# Patient Record
Sex: MALE | Race: WHITE | Employment: FULL TIME | URBAN - METROPOLITAN AREA
[De-identification: names, ages, dates, MRNs, and addresses within clinical notes are randomized per-mention and may not be internally consistent; named-entity substitution may affect disease eponyms.]

---

## 2017-01-13 RX ORDER — METFORMIN HYDROCHLORIDE 500 MG/1
TABLET, EXTENDED RELEASE ORAL
Qty: 120 TABLET | Refills: 0 | Status: SHIPPED | OUTPATIENT
Start: 2017-01-13 | End: 2017-02-14 | Stop reason: SDUPTHER

## 2017-01-16 DIAGNOSIS — Z12.5 PROSTATE CANCER SCREENING: ICD-10-CM

## 2017-01-16 DIAGNOSIS — E11.8 TYPE 2 DIABETES MELLITUS WITH COMPLICATION, WITHOUT LONG-TERM CURRENT USE OF INSULIN (HCC): ICD-10-CM

## 2017-01-16 LAB
HBA1C MFR BLD: 8.5 % (ref 4.8–5.9)
PROSTATE SPECIFIC ANTIGEN: 0.72 NG/ML (ref 0–3.89)

## 2017-01-19 DIAGNOSIS — I10 ESSENTIAL HYPERTENSION: ICD-10-CM

## 2017-01-19 RX ORDER — SPIRONOLACTONE 50 MG/1
50 TABLET, FILM COATED ORAL DAILY
Qty: 30 TABLET | Refills: 5 | Status: SHIPPED | OUTPATIENT
Start: 2017-01-19 | End: 2017-07-16 | Stop reason: SDUPTHER

## 2017-02-01 RX ORDER — CANAGLIFLOZIN 100 MG/1
TABLET, FILM COATED ORAL
Qty: 90 TABLET | Refills: 1 | Status: SHIPPED | OUTPATIENT
Start: 2017-02-01 | End: 2017-09-07 | Stop reason: SDUPTHER

## 2017-02-01 RX ORDER — GLIPIZIDE 10 MG/1
TABLET, FILM COATED, EXTENDED RELEASE ORAL
Qty: 180 TABLET | Refills: 1 | Status: SHIPPED | OUTPATIENT
Start: 2017-02-01 | End: 2017-07-25 | Stop reason: SDUPTHER

## 2017-02-01 RX ORDER — LOSARTAN POTASSIUM AND HYDROCHLOROTHIAZIDE 25; 100 MG/1; MG/1
TABLET ORAL
Qty: 90 TABLET | Refills: 1 | Status: SHIPPED | OUTPATIENT
Start: 2017-02-01 | End: 2017-07-25 | Stop reason: SDUPTHER

## 2017-02-06 ENCOUNTER — OFFICE VISIT (OUTPATIENT)
Dept: FAMILY MEDICINE CLINIC | Age: 57
End: 2017-02-06

## 2017-02-06 VITALS
DIASTOLIC BLOOD PRESSURE: 84 MMHG | TEMPERATURE: 98.2 F | BODY MASS INDEX: 40.43 KG/M2 | WEIGHT: 315 LBS | RESPIRATION RATE: 15 BRPM | HEART RATE: 92 BPM | SYSTOLIC BLOOD PRESSURE: 138 MMHG | HEIGHT: 74 IN

## 2017-02-06 DIAGNOSIS — E78.1 HYPERTRIGLYCERIDEMIA: ICD-10-CM

## 2017-02-06 DIAGNOSIS — I10 ESSENTIAL HYPERTENSION: ICD-10-CM

## 2017-02-06 DIAGNOSIS — E11.8 TYPE 2 DIABETES MELLITUS WITH COMPLICATION, WITHOUT LONG-TERM CURRENT USE OF INSULIN (HCC): Primary | ICD-10-CM

## 2017-02-06 PROCEDURE — 99214 OFFICE O/P EST MOD 30 MIN: CPT | Performed by: FAMILY MEDICINE

## 2017-02-06 ASSESSMENT — ENCOUNTER SYMPTOMS
ABDOMINAL PAIN: 0
SHORTNESS OF BREATH: 0

## 2017-02-14 RX ORDER — METFORMIN HYDROCHLORIDE 500 MG/1
TABLET, EXTENDED RELEASE ORAL
Qty: 120 TABLET | Refills: 0 | Status: SHIPPED | OUTPATIENT
Start: 2017-02-14 | End: 2017-03-15 | Stop reason: SDUPTHER

## 2017-03-15 RX ORDER — METFORMIN HYDROCHLORIDE 500 MG/1
TABLET, EXTENDED RELEASE ORAL
Qty: 120 TABLET | Refills: 0 | Status: SHIPPED | OUTPATIENT
Start: 2017-03-15 | End: 2017-04-14 | Stop reason: SDUPTHER

## 2017-03-30 RX ORDER — EXENATIDE 250 UG/ML
INJECTION SUBCUTANEOUS
Qty: 2.4 ML | Refills: 1 | Status: SHIPPED | OUTPATIENT
Start: 2017-03-30 | End: 2017-06-03 | Stop reason: SDUPTHER

## 2017-04-11 RX ORDER — METOPROLOL SUCCINATE 100 MG/1
TABLET, EXTENDED RELEASE ORAL
Refills: 3 | COMMUNITY
Start: 2017-03-15 | End: 2017-12-19 | Stop reason: SDUPTHER

## 2017-04-12 ENCOUNTER — OFFICE VISIT (OUTPATIENT)
Dept: CARDIOLOGY | Age: 57
End: 2017-04-12

## 2017-04-12 VITALS
SYSTOLIC BLOOD PRESSURE: 124 MMHG | OXYGEN SATURATION: 96 % | HEIGHT: 74 IN | DIASTOLIC BLOOD PRESSURE: 68 MMHG | RESPIRATION RATE: 16 BRPM | WEIGHT: 315 LBS | BODY MASS INDEX: 40.43 KG/M2 | HEART RATE: 88 BPM

## 2017-04-12 DIAGNOSIS — I10 ESSENTIAL HYPERTENSION: Primary | ICD-10-CM

## 2017-04-12 DIAGNOSIS — Z95.0 S/P CARDIAC PACEMAKER PROCEDURE: ICD-10-CM

## 2017-04-12 DIAGNOSIS — E66.9 OBESITY, UNSPECIFIED OBESITY SEVERITY, UNSPECIFIED OBESITY TYPE: ICD-10-CM

## 2017-04-12 DIAGNOSIS — Z87.891 HISTORY OF TOBACCO ABUSE: ICD-10-CM

## 2017-04-12 PROCEDURE — 99213 OFFICE O/P EST LOW 20 MIN: CPT | Performed by: INTERNAL MEDICINE

## 2017-04-12 ASSESSMENT — ENCOUNTER SYMPTOMS
SHORTNESS OF BREATH: 0
CHEST TIGHTNESS: 0

## 2017-04-14 RX ORDER — METFORMIN HYDROCHLORIDE 500 MG/1
TABLET, EXTENDED RELEASE ORAL
Qty: 120 TABLET | Refills: 0 | Status: SHIPPED | OUTPATIENT
Start: 2017-04-14 | End: 2017-05-13 | Stop reason: SDUPTHER

## 2017-05-15 RX ORDER — METFORMIN HYDROCHLORIDE 500 MG/1
TABLET, EXTENDED RELEASE ORAL
Qty: 120 TABLET | Refills: 0 | Status: SHIPPED | OUTPATIENT
Start: 2017-05-15 | End: 2017-06-13 | Stop reason: SDUPTHER

## 2017-06-05 RX ORDER — EXENATIDE 250 UG/ML
INJECTION SUBCUTANEOUS
Qty: 2.4 ML | Refills: 0 | Status: SHIPPED | OUTPATIENT
Start: 2017-06-05 | End: 2017-07-04 | Stop reason: SDUPTHER

## 2017-06-13 RX ORDER — METFORMIN HYDROCHLORIDE 500 MG/1
TABLET, EXTENDED RELEASE ORAL
Qty: 120 TABLET | Refills: 0 | Status: SHIPPED | OUTPATIENT
Start: 2017-06-13 | End: 2017-07-13 | Stop reason: SDUPTHER

## 2017-06-19 DIAGNOSIS — I10 ESSENTIAL HYPERTENSION: ICD-10-CM

## 2017-06-19 DIAGNOSIS — E78.1 HYPERTRIGLYCERIDEMIA: ICD-10-CM

## 2017-06-19 DIAGNOSIS — E11.8 TYPE 2 DIABETES MELLITUS WITH COMPLICATION, WITHOUT LONG-TERM CURRENT USE OF INSULIN (HCC): ICD-10-CM

## 2017-06-19 LAB
ALBUMIN SERPL-MCNC: 4.4 G/DL (ref 3.9–4.9)
ALP BLD-CCNC: 66 U/L (ref 35–104)
ALT SERPL-CCNC: 18 U/L (ref 0–41)
ANION GAP SERPL CALCULATED.3IONS-SCNC: 12 MEQ/L (ref 7–13)
AST SERPL-CCNC: 11 U/L (ref 0–40)
BASOPHILS ABSOLUTE: 0 K/UL (ref 0–0.2)
BASOPHILS RELATIVE PERCENT: 0.7 %
BILIRUB SERPL-MCNC: 0.3 MG/DL (ref 0–1.2)
BUN BLDV-MCNC: 17 MG/DL (ref 6–20)
CALCIUM SERPL-MCNC: 9.2 MG/DL (ref 8.6–10.2)
CHLORIDE BLD-SCNC: 100 MEQ/L (ref 98–107)
CHOLESTEROL, TOTAL: 199 MG/DL (ref 0–199)
CO2: 24 MEQ/L (ref 22–29)
CREAT SERPL-MCNC: 0.72 MG/DL (ref 0.7–1.2)
EOSINOPHILS ABSOLUTE: 0.2 K/UL (ref 0–0.7)
EOSINOPHILS RELATIVE PERCENT: 3.5 %
GFR AFRICAN AMERICAN: >60
GFR NON-AFRICAN AMERICAN: >60
GLOBULIN: 2.5 G/DL (ref 2.3–3.5)
GLUCOSE BLD-MCNC: 194 MG/DL (ref 74–109)
HBA1C MFR BLD: 9.2 % (ref 4.8–5.9)
HCT VFR BLD CALC: 47.4 % (ref 42–52)
HDLC SERPL-MCNC: 50 MG/DL (ref 40–59)
HEMOGLOBIN: 16 G/DL (ref 14–18)
LDL CHOLESTEROL CALCULATED: 107 MG/DL (ref 0–129)
LYMPHOCYTES ABSOLUTE: 1.9 K/UL (ref 1–4.8)
LYMPHOCYTES RELATIVE PERCENT: 27 %
MCH RBC QN AUTO: 26.7 PG (ref 27–31.3)
MCHC RBC AUTO-ENTMCNC: 33.7 % (ref 33–37)
MCV RBC AUTO: 79 FL (ref 80–100)
MONOCYTES ABSOLUTE: 0.5 K/UL (ref 0.2–0.8)
MONOCYTES RELATIVE PERCENT: 7.2 %
NEUTROPHILS ABSOLUTE: 4.3 K/UL (ref 1.4–6.5)
NEUTROPHILS RELATIVE PERCENT: 61.6 %
PDW BLD-RTO: 14.2 % (ref 11.5–14.5)
PLATELET # BLD: 198 K/UL (ref 130–400)
POTASSIUM SERPL-SCNC: 4.5 MEQ/L (ref 3.5–5.1)
RBC # BLD: 6 M/UL (ref 4.7–6.1)
SODIUM BLD-SCNC: 136 MEQ/L (ref 132–144)
TOTAL PROTEIN: 6.9 G/DL (ref 6.4–8.1)
TRIGL SERPL-MCNC: 210 MG/DL (ref 0–200)
WBC # BLD: 7 K/UL (ref 4.8–10.8)

## 2017-07-05 RX ORDER — EXENATIDE 250 UG/ML
INJECTION SUBCUTANEOUS
Qty: 2.4 ML | Refills: 0 | Status: SHIPPED | OUTPATIENT
Start: 2017-07-05 | End: 2017-07-17

## 2017-07-13 RX ORDER — METFORMIN HYDROCHLORIDE 500 MG/1
TABLET, EXTENDED RELEASE ORAL
Qty: 120 TABLET | Refills: 0 | Status: SHIPPED | OUTPATIENT
Start: 2017-07-13 | End: 2017-07-17 | Stop reason: SDUPTHER

## 2017-07-17 ENCOUNTER — OFFICE VISIT (OUTPATIENT)
Dept: FAMILY MEDICINE CLINIC | Age: 57
End: 2017-07-17

## 2017-07-17 VITALS
SYSTOLIC BLOOD PRESSURE: 136 MMHG | HEART RATE: 84 BPM | HEIGHT: 74 IN | DIASTOLIC BLOOD PRESSURE: 82 MMHG | WEIGHT: 315 LBS | BODY MASS INDEX: 40.43 KG/M2 | OXYGEN SATURATION: 96 % | RESPIRATION RATE: 16 BRPM | TEMPERATURE: 98.5 F

## 2017-07-17 DIAGNOSIS — I10 ESSENTIAL HYPERTENSION: ICD-10-CM

## 2017-07-17 DIAGNOSIS — E78.1 HYPERTRIGLYCERIDEMIA: ICD-10-CM

## 2017-07-17 DIAGNOSIS — E11.8 TYPE 2 DIABETES MELLITUS WITH COMPLICATION, WITHOUT LONG-TERM CURRENT USE OF INSULIN (HCC): Primary | ICD-10-CM

## 2017-07-17 DIAGNOSIS — E11.8 TYPE 2 DIABETES MELLITUS WITH COMPLICATION, WITHOUT LONG-TERM CURRENT USE OF INSULIN (HCC): ICD-10-CM

## 2017-07-17 LAB
CREATININE URINE: 39 MG/DL
MICROALBUMIN UR-MCNC: <1.2 MG/DL
MICROALBUMIN/CREAT UR-RTO: NORMAL MG/G (ref 0–30)

## 2017-07-17 PROCEDURE — 99214 OFFICE O/P EST MOD 30 MIN: CPT | Performed by: FAMILY MEDICINE

## 2017-07-17 RX ORDER — METFORMIN HYDROCHLORIDE 500 MG/1
TABLET, EXTENDED RELEASE ORAL
Qty: 120 TABLET | Refills: 2 | Status: SHIPPED | OUTPATIENT
Start: 2017-07-17 | End: 2017-11-12 | Stop reason: SDUPTHER

## 2017-07-17 ASSESSMENT — ENCOUNTER SYMPTOMS
SHORTNESS OF BREATH: 0
ABDOMINAL PAIN: 0

## 2017-07-25 ENCOUNTER — OFFICE VISIT (OUTPATIENT)
Dept: FAMILY MEDICINE CLINIC | Age: 57
End: 2017-07-25

## 2017-07-25 VITALS
SYSTOLIC BLOOD PRESSURE: 122 MMHG | OXYGEN SATURATION: 98 % | WEIGHT: 315 LBS | HEART RATE: 86 BPM | DIASTOLIC BLOOD PRESSURE: 72 MMHG | TEMPERATURE: 98.4 F | RESPIRATION RATE: 17 BRPM | HEIGHT: 74 IN | BODY MASS INDEX: 40.43 KG/M2

## 2017-07-25 DIAGNOSIS — E78.1 HYPERTRIGLYCERIDEMIA: ICD-10-CM

## 2017-07-25 DIAGNOSIS — E11.8 TYPE 2 DIABETES MELLITUS WITH COMPLICATION, WITHOUT LONG-TERM CURRENT USE OF INSULIN (HCC): Primary | ICD-10-CM

## 2017-07-25 PROCEDURE — 99212 OFFICE O/P EST SF 10 MIN: CPT | Performed by: FAMILY MEDICINE

## 2017-07-25 RX ORDER — GLIPIZIDE 10 MG/1
TABLET, FILM COATED, EXTENDED RELEASE ORAL
Qty: 180 TABLET | Refills: 0 | Status: SHIPPED | OUTPATIENT
Start: 2017-07-25 | End: 2017-10-31 | Stop reason: SDUPTHER

## 2017-07-25 RX ORDER — PRAVASTATIN SODIUM 10 MG
10 TABLET ORAL EVERY EVENING
Qty: 90 TABLET | Refills: 0 | Status: SHIPPED | OUTPATIENT
Start: 2017-07-25 | End: 2017-10-19 | Stop reason: SDUPTHER

## 2017-07-25 RX ORDER — LOSARTAN POTASSIUM AND HYDROCHLOROTHIAZIDE 25; 100 MG/1; MG/1
TABLET ORAL
Qty: 90 TABLET | Refills: 0 | Status: SHIPPED | OUTPATIENT
Start: 2017-07-25 | End: 2017-10-31 | Stop reason: SDUPTHER

## 2017-07-25 ASSESSMENT — ENCOUNTER SYMPTOMS
NAUSEA: 0
VOMITING: 0

## 2017-08-21 ENCOUNTER — OFFICE VISIT (OUTPATIENT)
Dept: FAMILY MEDICINE CLINIC | Age: 57
End: 2017-08-21

## 2017-08-21 VITALS
RESPIRATION RATE: 20 BRPM | DIASTOLIC BLOOD PRESSURE: 84 MMHG | HEIGHT: 74 IN | OXYGEN SATURATION: 98 % | TEMPERATURE: 97.6 F | BODY MASS INDEX: 40.43 KG/M2 | SYSTOLIC BLOOD PRESSURE: 128 MMHG | WEIGHT: 315 LBS | HEART RATE: 87 BPM

## 2017-08-21 DIAGNOSIS — I10 ESSENTIAL HYPERTENSION: ICD-10-CM

## 2017-08-21 DIAGNOSIS — I25.10 CORONARY ARTERY DISEASE DUE TO LIPID RICH PLAQUE: ICD-10-CM

## 2017-08-21 DIAGNOSIS — Z00.00 HEALTH MAINTENANCE EXAMINATION: Primary | ICD-10-CM

## 2017-08-21 DIAGNOSIS — I25.83 CORONARY ARTERY DISEASE DUE TO LIPID RICH PLAQUE: ICD-10-CM

## 2017-08-21 DIAGNOSIS — E66.01 MORBID OBESITY WITH BMI OF 40.0-44.9, ADULT (HCC): ICD-10-CM

## 2017-08-21 DIAGNOSIS — E11.8 TYPE 2 DIABETES MELLITUS WITH COMPLICATION, WITHOUT LONG-TERM CURRENT USE OF INSULIN (HCC): ICD-10-CM

## 2017-08-21 DIAGNOSIS — E78.1 HYPERTRIGLYCERIDEMIA: ICD-10-CM

## 2017-08-21 PROCEDURE — 99396 PREV VISIT EST AGE 40-64: CPT | Performed by: FAMILY MEDICINE

## 2017-08-21 ASSESSMENT — ENCOUNTER SYMPTOMS
ABDOMINAL PAIN: 0
SHORTNESS OF BREATH: 0

## 2017-09-08 RX ORDER — CANAGLIFLOZIN 100 MG/1
TABLET, FILM COATED ORAL
Qty: 90 TABLET | Refills: 0 | Status: SHIPPED | OUTPATIENT
Start: 2017-09-08 | End: 2017-11-01 | Stop reason: SDUPTHER

## 2017-09-16 DIAGNOSIS — I10 ESSENTIAL HYPERTENSION: ICD-10-CM

## 2017-09-17 RX ORDER — METOPROLOL SUCCINATE 100 MG/1
TABLET, EXTENDED RELEASE ORAL
Qty: 30 TABLET | Refills: 2 | Status: SHIPPED | OUTPATIENT
Start: 2017-09-17 | End: 2017-12-14 | Stop reason: SDUPTHER

## 2017-10-11 ENCOUNTER — OFFICE VISIT (OUTPATIENT)
Dept: CARDIOLOGY | Age: 57
End: 2017-10-11

## 2017-10-11 VITALS
WEIGHT: 315 LBS | DIASTOLIC BLOOD PRESSURE: 72 MMHG | HEART RATE: 75 BPM | OXYGEN SATURATION: 96 % | RESPIRATION RATE: 16 BRPM | BODY MASS INDEX: 40.43 KG/M2 | HEIGHT: 74 IN | SYSTOLIC BLOOD PRESSURE: 132 MMHG

## 2017-10-11 DIAGNOSIS — I25.119 CORONARY ARTERY DISEASE INVOLVING NATIVE CORONARY ARTERY OF NATIVE HEART WITH ANGINA PECTORIS (HCC): ICD-10-CM

## 2017-10-11 DIAGNOSIS — Z95.0 S/P CARDIAC PACEMAKER PROCEDURE: ICD-10-CM

## 2017-10-11 DIAGNOSIS — R06.09 DOE (DYSPNEA ON EXERTION): ICD-10-CM

## 2017-10-11 DIAGNOSIS — R00.2 HEART PALPITATIONS: Primary | ICD-10-CM

## 2017-10-11 PROCEDURE — 99213 OFFICE O/P EST LOW 20 MIN: CPT | Performed by: INTERNAL MEDICINE

## 2017-10-11 ASSESSMENT — ENCOUNTER SYMPTOMS
CHEST TIGHTNESS: 0
SHORTNESS OF BREATH: 0

## 2017-10-11 NOTE — PROGRESS NOTES
Reason For Visit:  Chief Complaint   Patient presents with    6 Month Follow-Up    Hypertension    Coronary Artery Disease       HPI:  10/11/2017: Patient presents today for follow-up of hypertension. He is a . In December it 24 years since his pacemaker. RSV obese. Still drives a truck. He is diabetic and is uncontrolled. No angina congestive heart failure symptoms of syncope. See me in 6 months. 4/12/201: For follow-up of hypertension. He is a . Had a pacemaker by [facility]. Blood pressure is well-controlled. Gained a lot of weight. He needs to lose that weight see me in 6 months.     10/12/16: *For fu of dizziness,On toprol 100. Norvasc DCd. Aldactone working. K 3.7. Return to work on [employer]. See in 3M     9/21/2016: For fu of CAD. Still has mild CP. Retrosternal.No NV. Heaviness. . Diabetic and hypertensive. Obese. EKG showed SR. S/P PPM.H/O AF. Will increase toprol tp 100. Start aldactone 50. DC norvasc. BMP in 2 weeks. See me in 3 weeks. Cant work till then.      10/27/2015: Stress test OK. Echo OK. See in 6M. . S/P PPM by [facility]  09/14/2015: [facility]. Had cathby OI. Mild disease. Sounds like angina. Had a PPM by [employer]. Set for lexiscan and echo. And see me.     Problem List:  Patient Active Problem List   Diagnosis    Hypertension    DM (diabetes mellitus) ([address]    SEGURA (dyspnea on exertion)    BMI 40.0-44.9, adult ([address]    History of tobacco abuse    Sinus arrest    S/P cardiac pacemaker procedure    Abnormal nuclear stress test    Phlebitis    CAD (coronary artery disease)    Dizziness    Heart palpitations    Angina pectoris (HCC)       Allergies: Allergies   Allergen Reactions    Actos [Pioglitazone]        Personal History:   has a past medical history of Angina pectoris ([address] Benign neoplasm of colon; Benign neoplasm of rectum and anal canal; Diverticulosis of colon (without mention of hemorrhage); Dizziness; Heart palpitations;  Hypertension; and Type II or unspecified type diabetes mellitus without mention of complication, not stated as uncontrolled. Social History:   reports that he quit smoking about 19 years ago. He has never used smokeless tobacco. He reports that he drinks alcohol. He reports that he does not use drugs. Surgical History:  Past Surgical History:   Procedure Laterality Date    CARDIAC SURGERY  12/31/13    DR Anali Nguyễn    [facility] CATARACT REMOVAL      bilateral    COLONOSCOPY  01/19/2015    DR Chun Pereyra - POLYPS, DIVERTICULOSIS    OTHER SURGICAL HISTORY  12/30/2013    TANSVENOUS PACER LEAD PLACEMENT        Family History:  family history includes Cancer in his mother; Diabetes in his father.       Current Medications:    Current Outpatient Prescriptions:     metoprolol succinate (TOPROL XL) 100 MG extended release tablet, TAKE ONE TABLET BY MOUTH EVERY DAY, Disp: 30 tablet, Rfl: 2    INVOKANA 100 MG TABS tablet, Take 1 tablet by mouth  every morning before  breakfast, Disp: 90 tablet, Rfl: 0    losartan-hydrochlorothiazide (HYZAAR) 100-25 MG per tablet, Take 1 tablet by mouth  daily, Disp: 90 tablet, Rfl: 0    glipiZIDE (GLUCOTROL XL) 10 MG extended release tablet, Take 2 tablets by mouth  every day, Disp: 180 tablet, Rfl: 0    pravastatin (PRAVACHOL) 10 MG tablet, Take 1 tablet by mouth every evening, Disp: 90 tablet, Rfl: 0    spironolactone (ALDACTONE) 50 MG tablet, TAKE ONE TABLET BY MOUTH EVERY DAY, Disp: 30 tablet, Rfl: 11    metFORMIN (GLUCOPHAGE-XR) 500 MG extended release tablet, TAKE FOUR (4) TABLETS BY MOUTH EVERY DAY, Disp: 120 tablet, Rfl: 2    insulin glargine (TOUJEO SOLOSTAR) 300 UNIT/ML injection pen, 10 units sq daily (Patient taking differently: 15 units sq daily), Disp: 1 Pen, Rfl: 1    NOVOFINE 30G X 8 MM MISC, USE DAILY AS DIRECTED, Disp: 200 each, Rfl: 2    Insulin Pen Needle (NOVOFINE) 30G X 8 MM MISC, Uses 1 daily to inject insulin, Disp: 100 each, Rfl: 0    metoprolol succinate (TOPROL XL) 100 MG orders of the defined types were placed in this encounter. Plan:  1. Patient to see me in 6 months.  > I will continue to monitor patient clinically, if symptoms develop or worsen, they are to let me know ASAP or head to the nearest emergeny room. > Follow up as discussed or call office sooner if needed.  > If refills are needed after appointment contact pharmacy.       Electronically signed by: Gurdeep Mattson MD  10/11/2017 10:09 AM

## 2017-10-20 RX ORDER — PRAVASTATIN SODIUM 10 MG
TABLET ORAL
Qty: 30 TABLET | Refills: 0 | Status: SHIPPED | OUTPATIENT
Start: 2017-10-20 | End: 2017-11-23 | Stop reason: SDUPTHER

## 2017-10-31 RX ORDER — LOSARTAN POTASSIUM AND HYDROCHLOROTHIAZIDE 25; 100 MG/1; MG/1
TABLET ORAL
Qty: 90 TABLET | Refills: 0 | Status: SHIPPED | OUTPATIENT
Start: 2017-10-31 | End: 2018-02-01 | Stop reason: SDUPTHER

## 2017-10-31 RX ORDER — GLIPIZIDE 10 MG/1
TABLET, FILM COATED, EXTENDED RELEASE ORAL
Qty: 180 TABLET | Refills: 0 | Status: SHIPPED | OUTPATIENT
Start: 2017-10-31 | End: 2018-02-01 | Stop reason: SDUPTHER

## 2017-11-01 RX ORDER — CANAGLIFLOZIN 100 MG/1
TABLET, FILM COATED ORAL
Qty: 90 TABLET | Refills: 0 | Status: SHIPPED | OUTPATIENT
Start: 2017-11-01 | End: 2018-02-01 | Stop reason: SDUPTHER

## 2017-11-13 RX ORDER — METFORMIN HYDROCHLORIDE 500 MG/1
TABLET, EXTENDED RELEASE ORAL
Qty: 120 TABLET | Refills: 1 | Status: SHIPPED | OUTPATIENT
Start: 2017-11-13 | End: 2018-01-18 | Stop reason: SDUPTHER

## 2017-11-17 RX ORDER — INSULIN GLARGINE 300 U/ML
INJECTION, SOLUTION SUBCUTANEOUS
Qty: 4.5 ML | Refills: 0 | Status: SHIPPED | OUTPATIENT
Start: 2017-11-17 | End: 2018-01-19 | Stop reason: SDUPTHER

## 2017-11-27 RX ORDER — PRAVASTATIN SODIUM 10 MG
TABLET ORAL
Qty: 30 TABLET | Refills: 0 | Status: SHIPPED | OUTPATIENT
Start: 2017-11-27 | End: 2017-12-28 | Stop reason: SDUPTHER

## 2017-12-14 DIAGNOSIS — I10 ESSENTIAL HYPERTENSION: ICD-10-CM

## 2017-12-14 RX ORDER — METOPROLOL SUCCINATE 100 MG/1
TABLET, EXTENDED RELEASE ORAL
Qty: 30 TABLET | Refills: 1 | Status: SHIPPED | OUTPATIENT
Start: 2017-12-14 | End: 2018-01-26 | Stop reason: SDUPTHER

## 2017-12-19 ENCOUNTER — OFFICE VISIT (OUTPATIENT)
Dept: INTERNAL MEDICINE | Age: 57
End: 2017-12-19

## 2017-12-19 VITALS
HEART RATE: 88 BPM | TEMPERATURE: 97.8 F | OXYGEN SATURATION: 95 % | HEIGHT: 74 IN | DIASTOLIC BLOOD PRESSURE: 84 MMHG | WEIGHT: 315 LBS | SYSTOLIC BLOOD PRESSURE: 138 MMHG | BODY MASS INDEX: 40.43 KG/M2 | RESPIRATION RATE: 20 BRPM

## 2017-12-19 DIAGNOSIS — J40 BRONCHITIS: Primary | ICD-10-CM

## 2017-12-19 DIAGNOSIS — J02.9 SORE THROAT: ICD-10-CM

## 2017-12-19 DIAGNOSIS — J01.00 ACUTE MAXILLARY SINUSITIS, RECURRENCE NOT SPECIFIED: ICD-10-CM

## 2017-12-19 LAB — S PYO AG THROAT QL: NORMAL

## 2017-12-19 PROCEDURE — G8598 ASA/ANTIPLAT THER USED: HCPCS | Performed by: NURSE PRACTITIONER

## 2017-12-19 PROCEDURE — G8417 CALC BMI ABV UP PARAM F/U: HCPCS | Performed by: NURSE PRACTITIONER

## 2017-12-19 PROCEDURE — 3017F COLORECTAL CA SCREEN DOC REV: CPT | Performed by: NURSE PRACTITIONER

## 2017-12-19 PROCEDURE — 1036F TOBACCO NON-USER: CPT | Performed by: NURSE PRACTITIONER

## 2017-12-19 PROCEDURE — G8484 FLU IMMUNIZE NO ADMIN: HCPCS | Performed by: NURSE PRACTITIONER

## 2017-12-19 PROCEDURE — G8427 DOCREV CUR MEDS BY ELIG CLIN: HCPCS | Performed by: NURSE PRACTITIONER

## 2017-12-19 PROCEDURE — 87880 STREP A ASSAY W/OPTIC: CPT | Performed by: NURSE PRACTITIONER

## 2017-12-19 PROCEDURE — 99213 OFFICE O/P EST LOW 20 MIN: CPT | Performed by: NURSE PRACTITIONER

## 2017-12-19 RX ORDER — FLUTICASONE PROPIONATE 50 MCG
2 SPRAY, SUSPENSION (ML) NASAL DAILY
Qty: 1 BOTTLE | Refills: 0 | Status: SHIPPED | OUTPATIENT
Start: 2017-12-19 | End: 2018-10-06 | Stop reason: SDUPTHER

## 2017-12-19 RX ORDER — PREDNISONE 20 MG/1
40 TABLET ORAL DAILY
Qty: 10 TABLET | Refills: 0 | Status: SHIPPED | OUTPATIENT
Start: 2017-12-19 | End: 2017-12-24

## 2017-12-19 RX ORDER — GUAIFENESIN AND CODEINE PHOSPHATE 100; 10 MG/5ML; MG/5ML
5 SOLUTION ORAL EVERY 4 HOURS PRN
Qty: 150 ML | Refills: 0 | Status: SHIPPED | OUTPATIENT
Start: 2017-12-19 | End: 2017-12-26

## 2017-12-19 RX ORDER — DOXYCYCLINE HYCLATE 100 MG
100 TABLET ORAL 2 TIMES DAILY
Qty: 14 TABLET | Refills: 0 | Status: SHIPPED | OUTPATIENT
Start: 2017-12-19 | End: 2017-12-26

## 2017-12-19 ASSESSMENT — PATIENT HEALTH QUESTIONNAIRE - PHQ9
1. LITTLE INTEREST OR PLEASURE IN DOING THINGS: 0
2. FEELING DOWN, DEPRESSED OR HOPELESS: 0
SUM OF ALL RESPONSES TO PHQ9 QUESTIONS 1 & 2: 0
SUM OF ALL RESPONSES TO PHQ QUESTIONS 1-9: 0

## 2017-12-19 ASSESSMENT — ENCOUNTER SYMPTOMS
NAUSEA: 0
SORE THROAT: 1
COUGH: 1
WHEEZING: 1
ABDOMINAL PAIN: 0
SINUS PRESSURE: 1

## 2017-12-19 NOTE — LETTER
[address] [address]  Phone: [phone]  Fax: [phone]    Eleni Bob CNP        December 19, 2017     Patient: David Rothman   YOB: 1960   Date of Visit: 12/19/2017       To Whom it May Concern:    Darshan Orellana was seen in my clinic on 12/19/2017. He is under  my care from 12/19/17 through 12/25/17 and may return to work  on 12/26/17. If you have any questions or concerns, please don't hesitate to call.     Sincerely,         Heriberto Kay Sr., CNP

## 2017-12-19 NOTE — PATIENT INSTRUCTIONS
are not getting better as expected. Where can you learn more? Go to [email or web address] and sign in to your [employer] account. Enter H333 in the Tipstar box to learn more about \"Bronchitis: Care Instructions. \"     If you do not have an account, please click on the \"Sign Up Now\" link. Current as of: May 12, 2017  Content Version: 11.4  © 4441-4063 Healthwise, Incorporated. Care instructions adapted under license by [facility] ([facility]). If you have questions about a medical condition or this instruction, always ask your healthcare professional. [address] any warranty or liability for your use of this information.

## 2017-12-22 LAB — THROAT CULTURE: NORMAL

## 2017-12-26 ENCOUNTER — APPOINTMENT (OUTPATIENT)
Dept: GENERAL RADIOLOGY | Age: 57
End: 2017-12-26
Payer: COMMERCIAL

## 2017-12-26 ENCOUNTER — HOSPITAL ENCOUNTER (EMERGENCY)
Age: 57
Discharge: HOME | End: 2017-12-26
Attending: EMERGENCY MEDICINE
Payer: COMMERCIAL

## 2017-12-26 VITALS
RESPIRATION RATE: 16 BRPM | DIASTOLIC BLOOD PRESSURE: 88 MMHG | BODY MASS INDEX: 40.43 KG/M2 | WEIGHT: 315 LBS | OXYGEN SATURATION: 95 % | HEART RATE: 82 BPM | TEMPERATURE: 97.9 F | SYSTOLIC BLOOD PRESSURE: 146 MMHG | HEIGHT: 74 IN

## 2017-12-26 DIAGNOSIS — I20.8 OTHER FORMS OF ANGINA PECTORIS (HCC): Primary | ICD-10-CM

## 2017-12-26 LAB
ALBUMIN SERPL-MCNC: 4.5 G/DL (ref 3.9–4.9)
ALP BLD-CCNC: 63 U/L (ref 35–104)
ALT SERPL-CCNC: 30 U/L (ref 0–41)
ANION GAP SERPL CALCULATED.3IONS-SCNC: 16 MEQ/L (ref 7–13)
AST SERPL-CCNC: 21 U/L (ref 0–40)
BILIRUB SERPL-MCNC: 0.6 MG/DL (ref 0–1.2)
BUN BLDV-MCNC: 22 MG/DL (ref 6–20)
CALCIUM SERPL-MCNC: 9.8 MG/DL (ref 8.6–10.2)
CHLORIDE BLD-SCNC: 98 MEQ/L (ref 98–107)
CO2: 23 MEQ/L (ref 22–29)
CREAT SERPL-MCNC: 0.65 MG/DL (ref 0.7–1.2)
EKG ATRIAL RATE: 78 BPM
EKG P AXIS: 55 DEGREES
EKG P-R INTERVAL: 158 MS
EKG Q-T INTERVAL: 384 MS
EKG QRS DURATION: 84 MS
EKG QTC CALCULATION (BAZETT): 437 MS
EKG R AXIS: 57 DEGREES
EKG T AXIS: 63 DEGREES
EKG VENTRICULAR RATE: 78 BPM
GFR AFRICAN AMERICAN: >60
GFR NON-AFRICAN AMERICAN: >60
GLOBULIN: 2.6 G/DL (ref 2.3–3.5)
GLUCOSE BLD-MCNC: 238 MG/DL (ref 74–109)
POTASSIUM SERPL-SCNC: 4.7 MEQ/L (ref 3.5–5.1)
SODIUM BLD-SCNC: 137 MEQ/L (ref 132–144)
TOTAL PROTEIN: 7.1 G/DL (ref 6.4–8.1)
TROPONIN: <0.01 NG/ML (ref 0–0.01)
TROPONIN: <0.01 NG/ML (ref 0–0.01)

## 2017-12-26 PROCEDURE — 99284 EMERGENCY DEPT VISIT MOD MDM: CPT

## 2017-12-26 PROCEDURE — 71010 XR CHEST PORTABLE: CPT

## 2017-12-26 PROCEDURE — 36415 COLL VENOUS BLD VENIPUNCTURE: CPT

## 2017-12-26 PROCEDURE — 93005 ELECTROCARDIOGRAM TRACING: CPT

## 2017-12-26 PROCEDURE — 80053 COMPREHEN METABOLIC PANEL: CPT

## 2017-12-26 PROCEDURE — 84484 ASSAY OF TROPONIN QUANT: CPT

## 2017-12-26 RX ORDER — NITROGLYCERIN 0.4 MG/1
TABLET SUBLINGUAL
Qty: 25 TABLET | Refills: 3 | Status: SHIPPED | OUTPATIENT
Start: 2017-12-26 | End: 2019-07-15

## 2017-12-26 ASSESSMENT — ENCOUNTER SYMPTOMS
CHEST TIGHTNESS: 0
ANAL BLEEDING: 0
COLOR CHANGE: 0
RHINORRHEA: 0
VOMITING: 0
WHEEZING: 0
CONSTIPATION: 0
SORE THROAT: 0
ABDOMINAL DISTENTION: 0
STRIDOR: 0
EYE REDNESS: 0
SINUS PRESSURE: 0
ABDOMINAL PAIN: 0
BLOOD IN STOOL: 0
EYE DISCHARGE: 0
DIARRHEA: 0
EYE PAIN: 0
CHOKING: 0
VOICE CHANGE: 0
SHORTNESS OF BREATH: 0
FACIAL SWELLING: 0
PHOTOPHOBIA: 0
NAUSEA: 0
COUGH: 0
EYE ITCHING: 0
BACK PAIN: 0
TROUBLE SWALLOWING: 0

## 2017-12-26 NOTE — ED PROVIDER NOTES
[address] [facility] ED  eMERGENCY dEPARTMENT eNCOUnter      Pt Name: Evangelina Siemens  MRN: [id number]  [place] 1960  Date of evaluation: 12/26/2017  Provider: Aleida Grissom MD    CHIEF COMPLAINT       Chief Complaint   Patient presents with    Chest Pain     right rib pain for a couple weeks. today was dizzy cold sweats. felt uncomfortablle around throat at 1030 this morning. Went to urgent care, had abnormal ekg sent  to ed         HISTORY OF PRESENT ILLNESS   (Location/Symptom, Timing/Onset, Context/Setting, Quality, Duration, Modifying Factors, Severity)  Note limiting factors. Evangelina Siemens is a 56 y.o. male who presents to the emergency department With chest pain. Location symptoms are in the center of the chest.  Timing onset was several hours ago. Context and setting this happened at work. Quality pain was sharp. Duration 1-2 minutes. Modifying factors: Patient's had no prior treatment for this has had a pacemaker in the past he does not why other than he has slow heart rate. Severity mild to moderate. HPI    Nursing Notes were reviewed. REVIEW OF SYSTEMS    (2-9 systems for level 4, 10 or more for level 5)     Review of Systems   Constitutional: Negative for appetite change, chills, diaphoresis, fatigue and fever. HENT: Negative for congestion, dental problem, ear discharge, ear pain, facial swelling, hearing loss, mouth sores, nosebleeds, postnasal drip, rhinorrhea, sinus pressure, sneezing, sore throat, tinnitus, trouble swallowing and voice change. Eyes: Negative for photophobia, pain, discharge, redness, itching and visual disturbance. Respiratory: Negative for cough, choking, chest tightness, shortness of breath, wheezing and stridor. Cardiovascular: Positive for chest pain. Negative for palpitations and leg swelling.    Gastrointestinal: Negative for abdominal distention, abdominal pain, anal bleeding, blood in stool, constipation, diarrhea, nausea and normal.   Nursing note and vitals reviewed. DIAGNOSTIC RESULTS     EKG: All EKG's are interpreted by the Emergency Department Physician who either signs or Co-signs this chart in the absence of a cardiologist.    2 EKGs were performed both are identical.  There is a normal sinus rhythm with a rate of 82. There is an occasional PVC otherwise no ectopy. The ST segments are all normal there is no evidence of an acute STEMI MI. There is no dysrhythmia. The patient has a pacemaker but this is a native rhythm. Both EKGs were interpreted as normal by myself    RADIOLOGY:   Non-plain film images such as CT, Ultrasound and MRI are read by the radiologist. Plain radiographic images are visualized and preliminarily interpreted by the emergency physician with the below findings:    One view portable chest x-ray was performed which shows slight cardiomegaly a dual lead pacemaker otherwise no bony abnormalities hemothorax or pneumothorax impression normal 1 view chest x-ray    Interpretation per the Radiologist below, if available at the time of this note:    XR Chest Portable   Final Result   CARDIAC SILHOUETTE, MILDLY ENLARGED. NO RADIOGRAPHIC FINDINGS OF ACUTE CARDIOPULMONARY DISEASE ON PORTABLE PLAIN FILM. ED BEDSIDE ULTRASOUND:   Performed by ED Physician - none    LABS:  Labs Reviewed   COMPREHENSIVE METABOLIC PANEL - Abnormal; Notable for the following:        Result Value    Anion Gap 16 (*)     Glucose 238 (*)     BUN 22 (*)     CREATININE 0.65 (*)     All other components within normal limits   TROPONIN   TROPONIN   CBC WITH AUTO DIFFERENTIAL       All other labs were within normal range or not returned as of this dictation.     EMERGENCY DEPARTMENT COURSE and DIFFERENTIAL DIAGNOSIS/MDM:   Vitals:    Vitals:    12/26/17 1416   BP: (!) 146/88   Pulse: 82   Resp: 16   Temp: 97.9 °F (36.6 °C)   TempSrc: Oral   SpO2: 95%   Weight: (!) 330 lb (149.7 kg)   Height: 6' 2\" (1.88 m)       The patient had 2 troponins performed both of which are completely normal EKG showed normal and a chest x-ray as normal I informed the patient of all of this given copies of all of his lab tests and both EKGs. Patient will be discharged home in improved condition with diagnosis of angina he was given a prescription for nitroglycerin and encouraged follow-up with his cardiologist Dr. Lara Gonzalez this week. He nodded his head and understanding these instructions    MDM      CRITICAL CARE TIME     CONSULTS:  None    PROCEDURES:  Unless otherwise noted below, none     Procedures    FINAL IMPRESSION      1. Other forms of angina pectoris [facility])          DISPOSITION/PLAN   DISPOSITION Decision To Discharge 12/26/2017 06:51:11 PM      PATIENT REFERRED TO:  Catrina Cummins MD  [address]  [phone]    Go in 2 days  For follow up. Return if worse in any way. DISCHARGE MEDICATIONS:  New Prescriptions    NITROGLYCERIN (NITROSTAT) 0.4 MG SL TABLET    up to max of 3 total doses. If no relief after 1 dose, call 911.           (Please note that portions of this note were completed with a voice recognition program.  Efforts were made to edit the dictations but occasionally words are mis-transcribed.)    Tawny Perez MD (electronically signed)  Attending Emergency Physician            Tawny Perez MD  12/26/17 8512

## 2017-12-27 PROCEDURE — 93010 ELECTROCARDIOGRAM REPORT: CPT | Performed by: INTERNAL MEDICINE

## 2017-12-28 RX ORDER — PRAVASTATIN SODIUM 10 MG
TABLET ORAL
Qty: 30 TABLET | Refills: 0 | Status: ON HOLD | OUTPATIENT
Start: 2017-12-28 | End: 2017-12-31 | Stop reason: HOSPADM

## 2017-12-29 ENCOUNTER — HOSPITAL ENCOUNTER (INPATIENT)
Age: 57
LOS: 2 days | Discharge: HOME | DRG: 282 | End: 2017-12-31
Attending: INTERNAL MEDICINE | Admitting: INTERNAL MEDICINE
Payer: COMMERCIAL

## 2017-12-29 PROBLEM — R07.9 CHEST PAIN: Status: ACTIVE | Noted: 2017-12-29

## 2017-12-29 LAB
GLUCOSE BLD-MCNC: 225 MG/DL (ref 60–115)
PERFORMED ON: ABNORMAL

## 2017-12-29 PROCEDURE — 1210000000 HC MED SURG R&B

## 2017-12-29 PROCEDURE — 6370000000 HC RX 637 (ALT 250 FOR IP): Performed by: INTERNAL MEDICINE

## 2017-12-29 RX ORDER — ASPIRIN 81 MG/1
81 TABLET, CHEWABLE ORAL DAILY
Status: DISCONTINUED | OUTPATIENT
Start: 2017-12-29 | End: 2017-12-31 | Stop reason: HOSPADM

## 2017-12-29 RX ORDER — NICOTINE POLACRILEX 4 MG
15 LOZENGE BUCCAL PRN
Status: DISCONTINUED | OUTPATIENT
Start: 2017-12-29 | End: 2017-12-31 | Stop reason: HOSPADM

## 2017-12-29 RX ORDER — INSULIN GLARGINE 100 [IU]/ML
8 INJECTION, SOLUTION SUBCUTANEOUS NIGHTLY
Status: DISCONTINUED | OUTPATIENT
Start: 2017-12-29 | End: 2017-12-31 | Stop reason: HOSPADM

## 2017-12-29 RX ORDER — METFORMIN HYDROCHLORIDE 500 MG/1
1000 TABLET, EXTENDED RELEASE ORAL 2 TIMES DAILY WITH MEALS
Status: DISCONTINUED | OUTPATIENT
Start: 2017-12-29 | End: 2017-12-31 | Stop reason: HOSPADM

## 2017-12-29 RX ORDER — DEXTROSE MONOHYDRATE 25 G/50ML
12.5 INJECTION, SOLUTION INTRAVENOUS PRN
Status: DISCONTINUED | OUTPATIENT
Start: 2017-12-29 | End: 2017-12-31 | Stop reason: HOSPADM

## 2017-12-29 RX ORDER — DEXTROSE MONOHYDRATE 50 MG/ML
100 INJECTION, SOLUTION INTRAVENOUS PRN
Status: DISCONTINUED | OUTPATIENT
Start: 2017-12-29 | End: 2017-12-31 | Stop reason: HOSPADM

## 2017-12-29 RX ORDER — NITROGLYCERIN 0.4 MG/1
0.4 TABLET SUBLINGUAL EVERY 5 MIN PRN
Status: DISCONTINUED | OUTPATIENT
Start: 2017-12-29 | End: 2017-12-31 | Stop reason: HOSPADM

## 2017-12-29 RX ORDER — METOPROLOL SUCCINATE 100 MG/1
100 TABLET, EXTENDED RELEASE ORAL DAILY
Status: DISCONTINUED | OUTPATIENT
Start: 2017-12-29 | End: 2017-12-31 | Stop reason: HOSPADM

## 2017-12-29 RX ORDER — LOSARTAN POTASSIUM AND HYDROCHLOROTHIAZIDE 12.5; 5 MG/1; MG/1
2 TABLET ORAL DAILY
Status: DISCONTINUED | OUTPATIENT
Start: 2017-12-23 | End: 2017-12-31 | Stop reason: HOSPADM

## 2017-12-29 RX ORDER — GLIPIZIDE 10 MG/1
10 TABLET ORAL
Status: DISCONTINUED | OUTPATIENT
Start: 2017-12-30 | End: 2017-12-31 | Stop reason: HOSPADM

## 2017-12-29 RX ORDER — SPIRONOLACTONE 50 MG/1
50 TABLET, FILM COATED ORAL DAILY
Status: DISCONTINUED | OUTPATIENT
Start: 2017-12-29 | End: 2017-12-31 | Stop reason: HOSPADM

## 2017-12-29 RX ORDER — PRAVASTATIN SODIUM 10 MG
10 TABLET ORAL NIGHTLY
Status: DISCONTINUED | OUTPATIENT
Start: 2017-12-29 | End: 2017-12-30

## 2017-12-29 RX ORDER — FLUTICASONE PROPIONATE 50 MCG
2 SPRAY, SUSPENSION (ML) NASAL DAILY
Status: DISCONTINUED | OUTPATIENT
Start: 2017-12-29 | End: 2017-12-31 | Stop reason: HOSPADM

## 2017-12-29 RX ADMIN — INSULIN GLARGINE 8 UNITS: 100 INJECTION, SOLUTION SUBCUTANEOUS at 21:53

## 2017-12-29 RX ADMIN — PRAVASTATIN SODIUM 10 MG: 10 TABLET ORAL at 21:53

## 2017-12-29 ASSESSMENT — PAIN SCALES - GENERAL: PAINLEVEL_OUTOF10: 0

## 2017-12-29 NOTE — PLAN OF CARE
Problem: Pain Control  Goal: Maintain pain level at or below patient's acceptable level (or 5 if patient is unable to determine acceptable level)  Outcome: Ongoing      Problem: Cardiovascular  Goal: No DVT, peripheral vascular complications  Outcome: Ongoing    Goal: Hemodynamic stability  Outcome: Ongoing    Goal: Anticoagulate/Hct stable  Outcome: Ongoing    Goal: Agreement to quit smoking  Outcome: Ongoing    Goal: Weight maintained or lost  Outcome: Ongoing    Goal: Understanding of dietary restrictions  Outcome: Ongoing      Problem: Respiratory  Goal: No pulmonary complications  Outcome: Ongoing    Goal: O2 Sat > 90%  Outcome: Ongoing      Problem: Skin Integrity/Risk  Goal: No skin breakdown during hospitalization  Outcome: Ongoing      Problem: Intellectual/Education/Knowledge Deficit  Goal: Teaching initiated upon admission  Outcome: Ongoing    Goal: Written Disposition Instruction form completed  Outcome: Ongoing      Problem: Emotional/Psychosocial  Goal: Understanding of community resources  Outcome: Ongoing

## 2017-12-30 PROBLEM — I21.4 NSTEMI (NON-ST ELEVATED MYOCARDIAL INFARCTION) (HCC): Status: ACTIVE | Noted: 2017-12-30

## 2017-12-30 LAB
GLUCOSE BLD-MCNC: 178 MG/DL (ref 60–115)
GLUCOSE BLD-MCNC: 198 MG/DL (ref 60–115)
GLUCOSE BLD-MCNC: 199 MG/DL (ref 60–115)
GLUCOSE BLD-MCNC: 254 MG/DL (ref 60–115)
GLUCOSE BLD-MCNC: 425 MG/DL (ref 60–115)
PERFORMED ON: ABNORMAL
TROPONIN: 0.01 NG/ML (ref 0–0.01)
TROPONIN: 0.02 NG/ML (ref 0–0.01)
TROPONIN: 0.02 NG/ML (ref 0–0.01)

## 2017-12-30 PROCEDURE — 6370000000 HC RX 637 (ALT 250 FOR IP): Performed by: INTERNAL MEDICINE

## 2017-12-30 PROCEDURE — 1210000000 HC MED SURG R&B

## 2017-12-30 PROCEDURE — 6360000002 HC RX W HCPCS: Performed by: INTERNAL MEDICINE

## 2017-12-30 PROCEDURE — 36415 COLL VENOUS BLD VENIPUNCTURE: CPT

## 2017-12-30 PROCEDURE — 99222 1ST HOSP IP/OBS MODERATE 55: CPT | Performed by: INTERNAL MEDICINE

## 2017-12-30 PROCEDURE — 93005 ELECTROCARDIOGRAM TRACING: CPT

## 2017-12-30 PROCEDURE — 84484 ASSAY OF TROPONIN QUANT: CPT

## 2017-12-30 RX ORDER — MAGNESIUM SULFATE IN WATER 40 MG/ML
4 INJECTION, SOLUTION INTRAVENOUS ONCE
Status: COMPLETED | OUTPATIENT
Start: 2017-12-30 | End: 2017-12-30

## 2017-12-30 RX ORDER — ISOSORBIDE MONONITRATE 30 MG/1
30 TABLET, EXTENDED RELEASE ORAL DAILY
Status: DISCONTINUED | OUTPATIENT
Start: 2017-12-30 | End: 2017-12-31 | Stop reason: HOSPADM

## 2017-12-30 RX ORDER — ATORVASTATIN CALCIUM 80 MG/1
80 TABLET, FILM COATED ORAL NIGHTLY
Status: DISCONTINUED | OUTPATIENT
Start: 2017-12-30 | End: 2017-12-31 | Stop reason: HOSPADM

## 2017-12-30 RX ADMIN — MAGNESIUM SULFATE IN WATER 4 G: 40 INJECTION, SOLUTION INTRAVENOUS at 12:15

## 2017-12-30 RX ADMIN — METFORMIN HYDROCHLORIDE 1000 MG: 500 TABLET, EXTENDED RELEASE ORAL at 10:03

## 2017-12-30 RX ADMIN — ASPIRIN 81 MG 81 MG: 81 TABLET ORAL at 10:04

## 2017-12-30 RX ADMIN — ATORVASTATIN CALCIUM 80 MG: 80 TABLET, FILM COATED ORAL at 21:33

## 2017-12-30 RX ADMIN — ISOSORBIDE MONONITRATE 30 MG: 30 TABLET, EXTENDED RELEASE ORAL at 10:10

## 2017-12-30 RX ADMIN — METOPROLOL SUCCINATE 100 MG: 100 TABLET, EXTENDED RELEASE ORAL at 10:04

## 2017-12-30 RX ADMIN — CANAGLIFLOZIN 100 MG: 100 TABLET, FILM COATED ORAL at 10:10

## 2017-12-30 RX ADMIN — RIVAROXABAN 20 MG: 20 TABLET, FILM COATED ORAL at 10:04

## 2017-12-30 RX ADMIN — GLIPIZIDE 10 MG: 10 TABLET ORAL at 10:03

## 2017-12-30 RX ADMIN — SPIRONOLACTONE 50 MG: 50 TABLET ORAL at 10:03

## 2017-12-30 RX ADMIN — INSULIN GLARGINE 8 UNITS: 100 INJECTION, SOLUTION SUBCUTANEOUS at 21:33

## 2017-12-30 RX ADMIN — METFORMIN HYDROCHLORIDE 1000 MG: 500 TABLET, EXTENDED RELEASE ORAL at 17:45

## 2017-12-30 RX ADMIN — LOSARTAN POTASSIUM AND HYDROCHLOROTHIAZIDE 2 TABLET: 12.5; 5 TABLET ORAL at 10:04

## 2017-12-30 ASSESSMENT — ENCOUNTER SYMPTOMS
EYES NEGATIVE: 1
STRIDOR: 0
WHEEZING: 0
NAUSEA: 0
SHORTNESS OF BREATH: 1
BLOOD IN STOOL: 0
GASTROINTESTINAL NEGATIVE: 1
CHEST TIGHTNESS: 1
COUGH: 0

## 2017-12-31 VITALS
SYSTOLIC BLOOD PRESSURE: 134 MMHG | RESPIRATION RATE: 18 BRPM | BODY MASS INDEX: 40.43 KG/M2 | TEMPERATURE: 97.9 F | HEART RATE: 72 BPM | OXYGEN SATURATION: 96 % | DIASTOLIC BLOOD PRESSURE: 77 MMHG | HEIGHT: 74 IN | WEIGHT: 315 LBS

## 2017-12-31 PROBLEM — R07.9 CHEST PAIN: Status: RESOLVED | Noted: 2017-12-29 | Resolved: 2017-12-31

## 2017-12-31 LAB
ANION GAP SERPL CALCULATED.3IONS-SCNC: 14 MEQ/L (ref 7–13)
BUN BLDV-MCNC: 18 MG/DL (ref 6–20)
CALCIUM SERPL-MCNC: 8.7 MG/DL (ref 8.6–10.2)
CHLORIDE BLD-SCNC: 100 MEQ/L (ref 98–107)
CO2: 25 MEQ/L (ref 22–29)
CREAT SERPL-MCNC: 0.7 MG/DL (ref 0.7–1.2)
EKG ATRIAL RATE: 73 BPM
EKG ATRIAL RATE: 75 BPM
EKG P AXIS: 60 DEGREES
EKG P AXIS: 61 DEGREES
EKG P-R INTERVAL: 162 MS
EKG P-R INTERVAL: 168 MS
EKG Q-T INTERVAL: 404 MS
EKG Q-T INTERVAL: 410 MS
EKG QRS DURATION: 88 MS
EKG QRS DURATION: 90 MS
EKG QTC CALCULATION (BAZETT): 445 MS
EKG QTC CALCULATION (BAZETT): 457 MS
EKG R AXIS: 50 DEGREES
EKG R AXIS: 52 DEGREES
EKG T AXIS: 41 DEGREES
EKG T AXIS: 52 DEGREES
EKG VENTRICULAR RATE: 73 BPM
EKG VENTRICULAR RATE: 75 BPM
GFR AFRICAN AMERICAN: >60
GFR NON-AFRICAN AMERICAN: >60
GLUCOSE BLD-MCNC: 189 MG/DL (ref 60–115)
GLUCOSE BLD-MCNC: 189 MG/DL (ref 74–109)
PERFORMED ON: ABNORMAL
POTASSIUM SERPL-SCNC: 4 MEQ/L (ref 3.5–5.1)
SODIUM BLD-SCNC: 139 MEQ/L (ref 132–144)

## 2017-12-31 PROCEDURE — 93005 ELECTROCARDIOGRAM TRACING: CPT

## 2017-12-31 PROCEDURE — 99238 HOSP IP/OBS DSCHRG MGMT 30/<: CPT | Performed by: INTERNAL MEDICINE

## 2017-12-31 PROCEDURE — 6370000000 HC RX 637 (ALT 250 FOR IP): Performed by: INTERNAL MEDICINE

## 2017-12-31 PROCEDURE — 80048 BASIC METABOLIC PNL TOTAL CA: CPT

## 2017-12-31 PROCEDURE — 36415 COLL VENOUS BLD VENIPUNCTURE: CPT

## 2017-12-31 RX ORDER — ATORVASTATIN CALCIUM 80 MG/1
80 TABLET, FILM COATED ORAL NIGHTLY
Qty: 30 TABLET | Refills: 3 | Status: SHIPPED | OUTPATIENT
Start: 2017-12-31 | End: 2018-05-02 | Stop reason: SDUPTHER

## 2017-12-31 RX ORDER — ISOSORBIDE MONONITRATE 30 MG/1
30 TABLET, EXTENDED RELEASE ORAL DAILY
Qty: 30 TABLET | Refills: 3 | Status: SHIPPED | OUTPATIENT
Start: 2017-12-31 | End: 2018-04-21 | Stop reason: SDUPTHER

## 2017-12-31 RX ADMIN — LOSARTAN POTASSIUM AND HYDROCHLOROTHIAZIDE 2 TABLET: 12.5; 5 TABLET ORAL at 10:18

## 2017-12-31 RX ADMIN — SPIRONOLACTONE 50 MG: 50 TABLET ORAL at 10:19

## 2017-12-31 RX ADMIN — CANAGLIFLOZIN 100 MG: 100 TABLET, FILM COATED ORAL at 10:19

## 2017-12-31 RX ADMIN — RIVAROXABAN 20 MG: 20 TABLET, FILM COATED ORAL at 10:18

## 2017-12-31 RX ADMIN — ASPIRIN 81 MG 81 MG: 81 TABLET ORAL at 10:19

## 2017-12-31 RX ADMIN — METOPROLOL SUCCINATE 100 MG: 100 TABLET, EXTENDED RELEASE ORAL at 10:18

## 2017-12-31 RX ADMIN — ISOSORBIDE MONONITRATE 30 MG: 30 TABLET, EXTENDED RELEASE ORAL at 10:19

## 2017-12-31 RX ADMIN — GLIPIZIDE 10 MG: 10 TABLET ORAL at 10:18

## 2017-12-31 RX ADMIN — METFORMIN HYDROCHLORIDE 1000 MG: 500 TABLET, EXTENDED RELEASE ORAL at 10:18

## 2018-01-02 ENCOUNTER — OFFICE VISIT (OUTPATIENT)
Dept: CARDIOLOGY | Age: 58
End: 2018-01-02

## 2018-01-02 VITALS
RESPIRATION RATE: 12 BRPM | OXYGEN SATURATION: 97 % | DIASTOLIC BLOOD PRESSURE: 68 MMHG | HEART RATE: 79 BPM | BODY MASS INDEX: 42.42 KG/M2 | SYSTOLIC BLOOD PRESSURE: 118 MMHG | WEIGHT: 315 LBS

## 2018-01-02 DIAGNOSIS — I21.4 NON-ST ELEVATION MI (NSTEMI) (HCC): ICD-10-CM

## 2018-01-02 DIAGNOSIS — I10 ESSENTIAL HYPERTENSION: ICD-10-CM

## 2018-01-02 DIAGNOSIS — R00.2 HEART PALPITATIONS: Primary | ICD-10-CM

## 2018-01-02 DIAGNOSIS — I25.119 CORONARY ARTERY DISEASE INVOLVING NATIVE CORONARY ARTERY OF NATIVE HEART WITH ANGINA PECTORIS (HCC): ICD-10-CM

## 2018-01-02 PROCEDURE — 99214 OFFICE O/P EST MOD 30 MIN: CPT | Performed by: INTERNAL MEDICINE

## 2018-01-02 ASSESSMENT — ENCOUNTER SYMPTOMS
CHEST TIGHTNESS: 0
SHORTNESS OF BREATH: 0
APNEA: 0

## 2018-01-02 NOTE — PROGRESS NOTES
SUBCUTANEOUSLY DAILY, Disp: 4.5 mL, Rfl: 0    metFORMIN (GLUCOPHAGE-XR) 500 MG extended release tablet, TAKE 4 TABLETS BY MOUTH DAILY , Disp: 120 tablet, Rfl: 1    INVOKANA 100 MG TABS tablet, TAKE 1 TABLET BY MOUTH  EVERY MORNING BEFORE  BREAKFAST, Disp: 90 tablet, Rfl: 0    glipiZIDE (GLUCOTROL XL) 10 MG extended release tablet, TAKE 2 TABLETS BY MOUTH  EVERY DAY, Disp: 180 tablet, Rfl: 0    losartan-hydrochlorothiazide (HYZAAR) 100-25 MG per tablet, TAKE 1 TABLET BY MOUTH  DAILY, Disp: 90 tablet, Rfl: 0    spironolactone (ALDACTONE) 50 MG tablet, TAKE ONE TABLET BY MOUTH EVERY DAY, Disp: 30 tablet, Rfl: 11    NOVOFINE 30G X 8 MM MISC, USE DAILY AS DIRECTED, Disp: 200 each, Rfl: 2    Insulin Pen Needle (NOVOFINE) 30G X 8 MM MISC, Uses 1 daily to inject insulin, Disp: 100 each, Rfl: 0    ONE TOUCH ULTRA TEST strip, Check sugar once daily, Disp: 100 strip, Rfl: 3    rivaroxaban (XARELTO) 20 MG TABS tablet, Take 20 mg by mouth, Disp: , Rfl:     aspirin 81 MG tablet, Take 81 mg by mouth daily. , Disp: , Rfl:       Review of Systems:  Review of Systems   Constitutional: Positive for fatigue. Negative for appetite change. Respiratory: Negative for apnea, chest tightness and shortness of breath. Cardiovascular: Negative for chest pain, palpitations and leg swelling. Neurological: Negative for dizziness, syncope, weakness, light-headedness and headaches. Hematological: Does not bruise/bleed easily. Psychiatric/Behavioral: Negative for agitation, behavioral problems and confusion. The patient is not nervous/anxious and is not hyperactive. All other systems reviewed and are negative. Objective  Vitals:    01/02/18 1327   BP: 118/68   Site: Right Arm   Position: Sitting   Cuff Size: Large Adult   Pulse: 79   Resp: 12   SpO2: 97%   Weight: (!) 330 lb 6.4 oz (149.9 kg)         Physical Exam:  Physical Exam   Constitutional: He is oriented to person, place, and time.  He appears well-developed and well-nourished. HENT:   Head: Atraumatic. Eyes: Conjunctivae are normal. Pupils are equal, round, and reactive to light. Neck: No JVD present. No tracheal deviation present. No thyromegaly present. Cardiovascular: Normal rate, regular rhythm and intact distal pulses. Exam reveals no gallop and no friction rub. No murmur heard. Pulmonary/Chest: No respiratory distress. He has no wheezes. He has no rales. He exhibits no tenderness. Abdominal: Soft. He exhibits no mass. There is no tenderness. There is no guarding. Musculoskeletal: Normal range of motion. He exhibits no edema. Lymphadenopathy:     He has no cervical adenopathy. Neurological: He is alert and oriented to person, place, and time. Skin: Skin is warm. No rash noted. No erythema. No pallor. Psychiatric: He has a normal mood and affect. Assessment/Orders:       ICD-10-CM ICD-9-CM    1. Heart palpitations R00.2 785.1    2. Essential hypertension I10 401.9    3. Coronary artery disease involving native coronary artery of native heart with angina pectoris ([facility] I25.119 414.01 Diagnostic Cardiac Cath Lab Procedure     413.9    4. Non-ST elevation MI (NSTEMI) ([facility] I21.4 410.70 Diagnostic Cardiac Cath Lab Procedure       No orders of the defined types were placed in this encounter. There are no discontinued medications. No orders of the defined types were placed in this encounter. Plan:  1. Patient to have heart cath 1/4/2018. Hold xarelto x 2 days  2. See me in after cath  > I will continue to monitor patient clinically, if symptoms develop or worsen, they are to let me know ASAP or head to the nearest emergeny room. > Follow up as discussed or call office sooner if needed.  > If refills are needed after appointment contact pharmacy.           Electronically signed by: Celeste Hsieh MD  1/2/2018 1:52 PM

## 2018-01-03 ENCOUNTER — TELEPHONE (OUTPATIENT)
Dept: FAMILY MEDICINE CLINIC | Age: 58
End: 2018-01-03

## 2018-01-04 ENCOUNTER — HOSPITAL ENCOUNTER (OUTPATIENT)
Dept: CARDIAC CATH/INVASIVE PROCEDURES | Age: 58
Discharge: HOME | End: 2018-01-04
Attending: INTERNAL MEDICINE | Admitting: INTERNAL MEDICINE
Payer: COMMERCIAL

## 2018-01-04 VITALS
SYSTOLIC BLOOD PRESSURE: 143 MMHG | HEIGHT: 74 IN | RESPIRATION RATE: 19 BRPM | OXYGEN SATURATION: 93 % | BODY MASS INDEX: 40.43 KG/M2 | DIASTOLIC BLOOD PRESSURE: 74 MMHG | HEART RATE: 79 BPM | WEIGHT: 315 LBS

## 2018-01-04 DIAGNOSIS — I25.119 CORONARY ARTERY DISEASE INVOLVING NATIVE CORONARY ARTERY OF NATIVE HEART WITH ANGINA PECTORIS (HCC): ICD-10-CM

## 2018-01-04 DIAGNOSIS — I21.4 NON-ST ELEVATION MI (NSTEMI) (HCC): ICD-10-CM

## 2018-01-04 LAB
GLUCOSE BLD-MCNC: 213 MG/DL (ref 60–115)
HCT VFR BLD CALC: 46.7 % (ref 42–52)
HEMOGLOBIN: 15.8 G/DL (ref 14–18)
INR BLD: 1
MCH RBC QN AUTO: 26.9 PG (ref 27–31.3)
MCHC RBC AUTO-ENTMCNC: 33.9 % (ref 33–37)
MCV RBC AUTO: 79.3 FL (ref 80–100)
PDW BLD-RTO: 14.4 % (ref 11.5–14.5)
PERFORMED ON: ABNORMAL
PLATELET # BLD: 187 K/UL (ref 130–400)
PROTHROMBIN TIME: 10.8 SEC (ref 8.1–13.7)
RBC # BLD: 5.89 M/UL (ref 4.7–6.1)
WBC # BLD: 10.2 K/UL (ref 4.8–10.8)

## 2018-01-04 PROCEDURE — 6360000002 HC RX W HCPCS

## 2018-01-04 PROCEDURE — 2500000003 HC RX 250 WO HCPCS

## 2018-01-04 PROCEDURE — 85027 COMPLETE CBC AUTOMATED: CPT

## 2018-01-04 PROCEDURE — 93458 L HRT ARTERY/VENTRICLE ANGIO: CPT | Performed by: INTERNAL MEDICINE

## 2018-01-04 PROCEDURE — C1894 INTRO/SHEATH, NON-LASER: HCPCS

## 2018-01-04 PROCEDURE — 2580000003 HC RX 258

## 2018-01-04 PROCEDURE — 2580000003 HC RX 258: Performed by: INTERNAL MEDICINE

## 2018-01-04 PROCEDURE — 85610 PROTHROMBIN TIME: CPT

## 2018-01-04 PROCEDURE — C1725 CATH, TRANSLUMIN NON-LASER: HCPCS

## 2018-01-04 RX ORDER — SODIUM CHLORIDE 9 MG/ML
INJECTION, SOLUTION INTRAVENOUS CONTINUOUS
Status: DISCONTINUED | OUTPATIENT
Start: 2018-01-04 | End: 2018-01-04 | Stop reason: HOSPADM

## 2018-01-04 RX ORDER — SODIUM CHLORIDE 0.9 % (FLUSH) 0.9 %
10 SYRINGE (ML) INJECTION PRN
Status: DISCONTINUED | OUTPATIENT
Start: 2018-01-04 | End: 2018-01-04 | Stop reason: HOSPADM

## 2018-01-04 RX ORDER — SODIUM CHLORIDE 0.9 % (FLUSH) 0.9 %
10 SYRINGE (ML) INJECTION EVERY 12 HOURS SCHEDULED
Status: DISCONTINUED | OUTPATIENT
Start: 2018-01-04 | End: 2018-01-04 | Stop reason: HOSPADM

## 2018-01-04 RX ADMIN — SODIUM CHLORIDE: 9 INJECTION, SOLUTION INTRAVENOUS at 13:03

## 2018-01-04 ASSESSMENT — PAIN SCALES - GENERAL: PAINLEVEL_OUTOF10: 0

## 2018-01-08 ENCOUNTER — TELEPHONE (OUTPATIENT)
Dept: FAMILY MEDICINE CLINIC | Age: 58
End: 2018-01-08

## 2018-01-08 NOTE — TELEPHONE ENCOUNTER
NOVOFINE 30G X 8 MM MISC  THESE ARE NO LONGER AVAILABLE PER THE PHARMACY, CAN YOU PLEASE CHANGE TO SOMETHING ELSE AND SEND TO PTS PHARMACY:  [facility] IN [place].

## 2018-01-09 ENCOUNTER — OFFICE VISIT (OUTPATIENT)
Dept: CARDIOLOGY | Age: 58
End: 2018-01-09

## 2018-01-09 VITALS
RESPIRATION RATE: 16 BRPM | HEIGHT: 74 IN | BODY MASS INDEX: 40.43 KG/M2 | HEART RATE: 89 BPM | OXYGEN SATURATION: 96 % | SYSTOLIC BLOOD PRESSURE: 118 MMHG | WEIGHT: 315 LBS | DIASTOLIC BLOOD PRESSURE: 64 MMHG

## 2018-01-09 DIAGNOSIS — I25.119 CORONARY ARTERY DISEASE INVOLVING NATIVE CORONARY ARTERY OF NATIVE HEART WITH ANGINA PECTORIS (HCC): ICD-10-CM

## 2018-01-09 DIAGNOSIS — I21.4 NSTEMI (NON-ST ELEVATED MYOCARDIAL INFARCTION) (HCC): Primary | ICD-10-CM

## 2018-01-09 PROCEDURE — 1111F DSCHRG MED/CURRENT MED MERGE: CPT | Performed by: INTERNAL MEDICINE

## 2018-01-09 PROCEDURE — 1036F TOBACCO NON-USER: CPT | Performed by: INTERNAL MEDICINE

## 2018-01-09 PROCEDURE — G8484 FLU IMMUNIZE NO ADMIN: HCPCS | Performed by: INTERNAL MEDICINE

## 2018-01-09 PROCEDURE — G8427 DOCREV CUR MEDS BY ELIG CLIN: HCPCS | Performed by: INTERNAL MEDICINE

## 2018-01-09 PROCEDURE — 3017F COLORECTAL CA SCREEN DOC REV: CPT | Performed by: INTERNAL MEDICINE

## 2018-01-09 PROCEDURE — G8598 ASA/ANTIPLAT THER USED: HCPCS | Performed by: INTERNAL MEDICINE

## 2018-01-09 PROCEDURE — 99213 OFFICE O/P EST LOW 20 MIN: CPT | Performed by: INTERNAL MEDICINE

## 2018-01-09 PROCEDURE — G8417 CALC BMI ABV UP PARAM F/U: HCPCS | Performed by: INTERNAL MEDICINE

## 2018-01-09 ASSESSMENT — ENCOUNTER SYMPTOMS
CHEST TIGHTNESS: 0
APNEA: 0
SHORTNESS OF BREATH: 0

## 2018-01-09 NOTE — PROGRESS NOTES
Reason For Visit:  Chief Complaint   Patient presents with    Follow Up After Procedure     Cath 1/4/2018       HPI:  1/9/2018: Patient presents today for Follow-up of left heart catheterization at [facility] recently. Showed mild diffuse disease. He was seen at [place] with atrial fibrillation. Was then transferred to [facility]. Troponins were mildly elevated. He was discharged home. 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 Has hypertension which is well-controlled. See me in 3 months. He can return to work with no restrictions. 1/2/2018: Patient presents today for Follow-up of recent evaluations the emergency room for chest pain. Last evaluation was at [facility]. That he was in atrial fibrillation. He was transferred to [facility]. Troponins were mildly elevated. He converted to sinus rhythm. He has multiple risk factors for coronary artery disease. We will schedule him to have a left heart catheterization and coronary angiography on Thursday at [facility]. Dr. Jesus Harden backup. Hold the Xarelto tomorrow and Thursday. Hold her diabetes pills on Thursday.     10/11/2017: Patient presents today for follow-up of hypertension. He is a . In December it 24 years since his pacemaker. RSV obese. Still drives a truck. He is diabetic and is uncontrolled. No angina congestive heart failure symptoms of syncope. See me in 6 months.     4/12/201: For follow-up of hypertension. He is a . Had a pacemaker by [employer]. Blood pressure is well-controlled. Gained a lot of weight. He needs to lose that weight see me in 6 months.     10/12/16: *For fu of dizziness,On toprol 100. Norvasc DCd. Aldactone working. K 3.7. Return to work on [employer]. See in      9/21/2016: For fu of CAD. Still has mild CP. Retrosternal.No NV. Heaviness. . Diabetic and hypertensive. Obese. EKG showed SR. S/P PPM.H/O AF. Will increase toprol tp 100. Start aldactone 50. DC norvasc. BMP in 2 weeks. See me in Disp: 30 tablet, Rfl: 3    nitroGLYCERIN (NITROSTAT) 0.4 MG SL tablet, up to max of 3 total doses. If no relief after 1 dose, call 911., Disp: 25 tablet, Rfl: 3    fluticasone (FLONASE) 50 MCG/ACT nasal spray, 2 sprays by Nasal route daily, Disp: 1 Bottle, Rfl: 0    metoprolol succinate (TOPROL XL) 100 MG extended release tablet, TAKE ONE TABLET BY MOUTH EVERY DAY, Disp: 30 tablet, Rfl: 1    TOUJEO SOLOSTAR 300 UNIT/ML injection pen, INJECT 10 UNITS SUBCUTANEOUSLY DAILY, Disp: 4.5 mL, Rfl: 0    metFORMIN (GLUCOPHAGE-XR) 500 MG extended release tablet, TAKE 4 TABLETS BY MOUTH DAILY , Disp: 120 tablet, Rfl: 1    INVOKANA 100 MG TABS tablet, TAKE 1 TABLET BY MOUTH  EVERY MORNING BEFORE  BREAKFAST, Disp: 90 tablet, Rfl: 0    glipiZIDE (GLUCOTROL XL) 10 MG extended release tablet, TAKE 2 TABLETS BY MOUTH  EVERY DAY, Disp: 180 tablet, Rfl: 0    losartan-hydrochlorothiazide (HYZAAR) 100-25 MG per tablet, TAKE 1 TABLET BY MOUTH  DAILY, Disp: 90 tablet, Rfl: 0    spironolactone (ALDACTONE) 50 MG tablet, TAKE ONE TABLET BY MOUTH EVERY DAY, Disp: 30 tablet, Rfl: 11    NOVOFINE 30G X 8 MM MISC, USE DAILY AS DIRECTED, Disp: 200 each, Rfl: 2    Insulin Pen Needle (NOVOFINE) 30G X 8 MM MISC, Uses 1 daily to inject insulin, Disp: 100 each, Rfl: 0    ONE TOUCH ULTRA TEST strip, Check sugar once daily, Disp: 100 strip, Rfl: 3    rivaroxaban (XARELTO) 20 MG TABS tablet, Take 20 mg by mouth, Disp: , Rfl:     aspirin 81 MG tablet, Take 81 mg by mouth daily. , Disp: , Rfl:       Review of Systems:  Review of Systems   Constitutional: Negative for appetite change and fatigue. Respiratory: Negative for apnea, chest tightness and shortness of breath. Cardiovascular: Negative for chest pain, palpitations and leg swelling. Neurological: Negative for dizziness, syncope, weakness, light-headedness and headaches. Hematological: Does not bruise/bleed easily.    Psychiatric/Behavioral: Negative for agitation, behavioral

## 2018-01-10 DIAGNOSIS — E11.9 TYPE 2 DIABETES MELLITUS WITHOUT COMPLICATION, UNSPECIFIED LONG TERM INSULIN USE STATUS: Primary | ICD-10-CM

## 2018-01-19 RX ORDER — INSULIN GLARGINE 300 U/ML
INJECTION, SOLUTION SUBCUTANEOUS
Qty: 4.5 ML | Refills: 0 | Status: SHIPPED | OUTPATIENT
Start: 2018-01-19 | End: 2018-03-18 | Stop reason: SDUPTHER

## 2018-01-19 RX ORDER — METFORMIN HYDROCHLORIDE 500 MG/1
TABLET, EXTENDED RELEASE ORAL
Qty: 120 TABLET | Refills: 0 | Status: SHIPPED | OUTPATIENT
Start: 2018-01-19 | End: 2018-02-16 | Stop reason: SDUPTHER

## 2018-01-26 DIAGNOSIS — I10 ESSENTIAL HYPERTENSION: ICD-10-CM

## 2018-01-26 RX ORDER — METOPROLOL SUCCINATE 100 MG/1
TABLET, EXTENDED RELEASE ORAL
Qty: 30 TABLET | Refills: 1 | OUTPATIENT
Start: 2018-01-26

## 2018-01-26 RX ORDER — METOPROLOL SUCCINATE 100 MG/1
TABLET, EXTENDED RELEASE ORAL
Qty: 30 TABLET | Refills: 1 | Status: SHIPPED | OUTPATIENT
Start: 2018-01-26 | End: 2018-04-21 | Stop reason: SDUPTHER

## 2018-02-16 RX ORDER — METFORMIN HYDROCHLORIDE 500 MG/1
TABLET, EXTENDED RELEASE ORAL
Qty: 120 TABLET | Refills: 0 | Status: SHIPPED | OUTPATIENT
Start: 2018-02-16 | End: 2018-03-16 | Stop reason: SDUPTHER

## 2018-03-16 RX ORDER — METFORMIN HYDROCHLORIDE 500 MG/1
TABLET, EXTENDED RELEASE ORAL
Qty: 120 TABLET | Refills: 0 | Status: SHIPPED | OUTPATIENT
Start: 2018-03-16 | End: 2018-05-25 | Stop reason: SDUPTHER

## 2018-03-19 RX ORDER — INSULIN GLARGINE 300 U/ML
INJECTION, SOLUTION SUBCUTANEOUS
Qty: 4.5 ML | Refills: 0 | Status: ON HOLD | OUTPATIENT
Start: 2018-03-19 | End: 2018-03-21

## 2018-03-21 ENCOUNTER — HOSPITAL ENCOUNTER (INPATIENT)
Age: 58
LOS: 2 days | Discharge: HOME | DRG: 309 | End: 2018-03-23
Attending: EMERGENCY MEDICINE | Admitting: INTERNAL MEDICINE
Payer: COMMERCIAL

## 2018-03-21 DIAGNOSIS — I47.1 SVT (SUPRAVENTRICULAR TACHYCARDIA) (HCC): Primary | ICD-10-CM

## 2018-03-21 DIAGNOSIS — I48.0 PAROXYSMAL ATRIAL FIBRILLATION (HCC): ICD-10-CM

## 2018-03-21 PROBLEM — I48.91 A-FIB (HCC): Status: ACTIVE | Noted: 2018-03-21

## 2018-03-21 LAB
ANION GAP SERPL CALCULATED.3IONS-SCNC: 16 MEQ/L (ref 7–13)
BASOPHILS ABSOLUTE: 0 K/UL (ref 0–0.2)
BASOPHILS RELATIVE PERCENT: 0.4 %
BUN BLDV-MCNC: 24 MG/DL (ref 6–20)
CALCIUM SERPL-MCNC: 9.3 MG/DL (ref 8.6–10.2)
CHLORIDE BLD-SCNC: 98 MEQ/L (ref 98–107)
CO2: 22 MEQ/L (ref 22–29)
CREAT SERPL-MCNC: 0.95 MG/DL (ref 0.7–1.2)
EOSINOPHILS ABSOLUTE: 0.2 K/UL (ref 0–0.7)
EOSINOPHILS RELATIVE PERCENT: 2.1 %
GFR AFRICAN AMERICAN: >60
GFR NON-AFRICAN AMERICAN: >60
GLUCOSE BLD-MCNC: 218 MG/DL (ref 60–115)
GLUCOSE BLD-MCNC: 246 MG/DL (ref 60–115)
GLUCOSE BLD-MCNC: 413 MG/DL (ref 74–109)
HBA1C MFR BLD: 11.1 % (ref 4.8–5.9)
HCT VFR BLD CALC: 44.2 % (ref 42–52)
HEMOGLOBIN: 14.9 G/DL (ref 14–18)
LYMPHOCYTES ABSOLUTE: 1.3 K/UL (ref 1–4.8)
LYMPHOCYTES RELATIVE PERCENT: 16.4 %
MCH RBC QN AUTO: 26.6 PG (ref 27–31.3)
MCHC RBC AUTO-ENTMCNC: 33.7 % (ref 33–37)
MCV RBC AUTO: 79.1 FL (ref 80–100)
MONOCYTES ABSOLUTE: 0.5 K/UL (ref 0.2–0.8)
MONOCYTES RELATIVE PERCENT: 6.6 %
NEUTROPHILS ABSOLUTE: 5.8 K/UL (ref 1.4–6.5)
NEUTROPHILS RELATIVE PERCENT: 74.5 %
PDW BLD-RTO: 14.7 % (ref 11.5–14.5)
PERFORMED ON: ABNORMAL
PERFORMED ON: ABNORMAL
PLATELET # BLD: 170 K/UL (ref 130–400)
POTASSIUM SERPL-SCNC: 4.5 MEQ/L (ref 3.5–5.1)
RBC # BLD: 5.59 M/UL (ref 4.7–6.1)
SODIUM BLD-SCNC: 136 MEQ/L (ref 132–144)
TROPONIN: 0.11 NG/ML (ref 0–0.01)
TROPONIN: 0.11 NG/ML (ref 0–0.01)
TROPONIN: <0.01 NG/ML (ref 0–0.01)
TSH SERPL DL<=0.05 MIU/L-ACNC: 2.41 UIU/ML (ref 0.27–4.2)
WBC # BLD: 7.8 K/UL (ref 4.8–10.8)

## 2018-03-21 PROCEDURE — 84443 ASSAY THYROID STIM HORMONE: CPT

## 2018-03-21 PROCEDURE — 84484 ASSAY OF TROPONIN QUANT: CPT

## 2018-03-21 PROCEDURE — 36415 COLL VENOUS BLD VENIPUNCTURE: CPT

## 2018-03-21 PROCEDURE — 6370000000 HC RX 637 (ALT 250 FOR IP): Performed by: NURSE PRACTITIONER

## 2018-03-21 PROCEDURE — 6370000000 HC RX 637 (ALT 250 FOR IP): Performed by: SPECIALIST

## 2018-03-21 PROCEDURE — 93005 ELECTROCARDIOGRAM TRACING: CPT

## 2018-03-21 PROCEDURE — 80048 BASIC METABOLIC PNL TOTAL CA: CPT

## 2018-03-21 PROCEDURE — 96374 THER/PROPH/DIAG INJ IV PUSH: CPT

## 2018-03-21 PROCEDURE — 85025 COMPLETE CBC W/AUTO DIFF WBC: CPT

## 2018-03-21 PROCEDURE — 99223 1ST HOSP IP/OBS HIGH 75: CPT | Performed by: INTERNAL MEDICINE

## 2018-03-21 PROCEDURE — 2580000003 HC RX 258: Performed by: NURSE PRACTITIONER

## 2018-03-21 PROCEDURE — 93010 ELECTROCARDIOGRAM REPORT: CPT | Performed by: INTERNAL MEDICINE

## 2018-03-21 PROCEDURE — 2060000000 HC ICU INTERMEDIATE R&B

## 2018-03-21 PROCEDURE — 83036 HEMOGLOBIN GLYCOSYLATED A1C: CPT

## 2018-03-21 PROCEDURE — 2500000003 HC RX 250 WO HCPCS: Performed by: EMERGENCY MEDICINE

## 2018-03-21 PROCEDURE — 99285 EMERGENCY DEPT VISIT HI MDM: CPT

## 2018-03-21 RX ORDER — MORPHINE SULFATE 2 MG/ML
2 INJECTION, SOLUTION INTRAMUSCULAR; INTRAVENOUS
Status: DISCONTINUED | OUTPATIENT
Start: 2018-03-21 | End: 2018-03-23 | Stop reason: HOSPADM

## 2018-03-21 RX ORDER — METOPROLOL TARTRATE 5 MG/5ML
5 INJECTION INTRAVENOUS EVERY 5 MIN PRN
Status: DISCONTINUED | OUTPATIENT
Start: 2018-03-21 | End: 2018-03-23 | Stop reason: HOSPADM

## 2018-03-21 RX ORDER — ONDANSETRON 2 MG/ML
4 INJECTION INTRAMUSCULAR; INTRAVENOUS EVERY 6 HOURS PRN
Status: DISCONTINUED | OUTPATIENT
Start: 2018-03-21 | End: 2018-03-23 | Stop reason: HOSPADM

## 2018-03-21 RX ORDER — METOPROLOL SUCCINATE 50 MG/1
50 TABLET, EXTENDED RELEASE ORAL DAILY
Status: DISCONTINUED | OUTPATIENT
Start: 2018-03-22 | End: 2018-03-23 | Stop reason: HOSPADM

## 2018-03-21 RX ORDER — SODIUM CHLORIDE 0.9 % (FLUSH) 0.9 %
10 SYRINGE (ML) INJECTION PRN
Status: DISCONTINUED | OUTPATIENT
Start: 2018-03-21 | End: 2018-03-23 | Stop reason: HOSPADM

## 2018-03-21 RX ORDER — INSULIN GLARGINE 100 [IU]/ML
10 INJECTION, SOLUTION SUBCUTANEOUS NIGHTLY
Status: DISCONTINUED | OUTPATIENT
Start: 2018-03-21 | End: 2018-03-23 | Stop reason: HOSPADM

## 2018-03-21 RX ORDER — ISOSORBIDE MONONITRATE 30 MG/1
30 TABLET, EXTENDED RELEASE ORAL DAILY
Status: DISCONTINUED | OUTPATIENT
Start: 2018-03-21 | End: 2018-03-23 | Stop reason: HOSPADM

## 2018-03-21 RX ORDER — NITROGLYCERIN 0.4 MG/1
0.4 TABLET SUBLINGUAL EVERY 5 MIN PRN
Status: DISCONTINUED | OUTPATIENT
Start: 2018-03-21 | End: 2018-03-23 | Stop reason: HOSPADM

## 2018-03-21 RX ORDER — GLIPIZIDE 10 MG/1
10 TABLET ORAL
Status: DISCONTINUED | OUTPATIENT
Start: 2018-03-22 | End: 2018-03-23 | Stop reason: HOSPADM

## 2018-03-21 RX ORDER — LOSARTAN POTASSIUM AND HYDROCHLOROTHIAZIDE 25; 100 MG/1; MG/1
1 TABLET ORAL DAILY
Status: DISCONTINUED | OUTPATIENT
Start: 2018-03-21 | End: 2018-03-21

## 2018-03-21 RX ORDER — SOTALOL HYDROCHLORIDE 120 MG/1
120 TABLET ORAL 2 TIMES DAILY
Status: DISCONTINUED | OUTPATIENT
Start: 2018-03-21 | End: 2018-03-23 | Stop reason: HOSPADM

## 2018-03-21 RX ORDER — SODIUM CHLORIDE 0.9 % (FLUSH) 0.9 %
10 SYRINGE (ML) INJECTION EVERY 12 HOURS SCHEDULED
Status: DISCONTINUED | OUTPATIENT
Start: 2018-03-21 | End: 2018-03-23 | Stop reason: HOSPADM

## 2018-03-21 RX ORDER — SPIRONOLACTONE 50 MG/1
50 TABLET, FILM COATED ORAL DAILY
Status: DISCONTINUED | OUTPATIENT
Start: 2018-03-21 | End: 2018-03-23 | Stop reason: HOSPADM

## 2018-03-21 RX ORDER — ACETAMINOPHEN 325 MG/1
650 TABLET ORAL EVERY 4 HOURS PRN
Status: DISCONTINUED | OUTPATIENT
Start: 2018-03-21 | End: 2018-03-23 | Stop reason: HOSPADM

## 2018-03-21 RX ORDER — METFORMIN HYDROCHLORIDE 500 MG/1
1000 TABLET, EXTENDED RELEASE ORAL 2 TIMES DAILY WITH MEALS
Status: DISCONTINUED | OUTPATIENT
Start: 2018-03-21 | End: 2018-03-23 | Stop reason: HOSPADM

## 2018-03-21 RX ORDER — ATORVASTATIN CALCIUM 80 MG/1
80 TABLET, FILM COATED ORAL NIGHTLY
Status: DISCONTINUED | OUTPATIENT
Start: 2018-03-21 | End: 2018-03-23 | Stop reason: HOSPADM

## 2018-03-21 RX ORDER — NICOTINE POLACRILEX 4 MG
15 LOZENGE BUCCAL PRN
Status: DISCONTINUED | OUTPATIENT
Start: 2018-03-21 | End: 2018-03-23 | Stop reason: HOSPADM

## 2018-03-21 RX ORDER — ASPIRIN 81 MG/1
81 TABLET, CHEWABLE ORAL DAILY
Status: DISCONTINUED | OUTPATIENT
Start: 2018-03-21 | End: 2018-03-23 | Stop reason: HOSPADM

## 2018-03-21 RX ORDER — METOPROLOL SUCCINATE 100 MG/1
1 TABLET, EXTENDED RELEASE ORAL DAILY
Status: DISCONTINUED | OUTPATIENT
Start: 2018-03-21 | End: 2018-03-21

## 2018-03-21 RX ORDER — DEXTROSE MONOHYDRATE 50 MG/ML
100 INJECTION, SOLUTION INTRAVENOUS PRN
Status: DISCONTINUED | OUTPATIENT
Start: 2018-03-21 | End: 2018-03-23 | Stop reason: HOSPADM

## 2018-03-21 RX ORDER — LOSARTAN POTASSIUM AND HYDROCHLOROTHIAZIDE 12.5; 5 MG/1; MG/1
2 TABLET ORAL DAILY
Status: DISCONTINUED | OUTPATIENT
Start: 2018-03-21 | End: 2018-03-23 | Stop reason: HOSPADM

## 2018-03-21 RX ORDER — DEXTROSE MONOHYDRATE 25 G/50ML
12.5 INJECTION, SOLUTION INTRAVENOUS PRN
Status: DISCONTINUED | OUTPATIENT
Start: 2018-03-21 | End: 2018-03-23 | Stop reason: HOSPADM

## 2018-03-21 RX ORDER — MORPHINE SULFATE 4 MG/ML
4 INJECTION, SOLUTION INTRAMUSCULAR; INTRAVENOUS
Status: DISCONTINUED | OUTPATIENT
Start: 2018-03-21 | End: 2018-03-23 | Stop reason: HOSPADM

## 2018-03-21 RX ORDER — FLUTICASONE PROPIONATE 50 MCG
2 SPRAY, SUSPENSION (ML) NASAL DAILY
Status: DISCONTINUED | OUTPATIENT
Start: 2018-03-21 | End: 2018-03-23 | Stop reason: HOSPADM

## 2018-03-21 RX ADMIN — ATORVASTATIN CALCIUM 80 MG: 80 TABLET, FILM COATED ORAL at 22:08

## 2018-03-21 RX ADMIN — SOTALOL HYDROCHLORIDE 120 MG: 120 TABLET ORAL at 14:58

## 2018-03-21 RX ADMIN — INSULIN LISPRO 4 UNITS: 100 INJECTION, SOLUTION INTRAVENOUS; SUBCUTANEOUS at 17:42

## 2018-03-21 RX ADMIN — METOROPROLOL TARTRATE 5 MG: 5 INJECTION, SOLUTION INTRAVENOUS at 11:58

## 2018-03-21 RX ADMIN — Medication 10 ML: at 22:07

## 2018-03-21 RX ADMIN — SOTALOL HYDROCHLORIDE 120 MG: 120 TABLET ORAL at 22:07

## 2018-03-21 RX ADMIN — INSULIN GLARGINE 10 UNITS: 100 INJECTION, SOLUTION SUBCUTANEOUS at 22:15

## 2018-03-21 ASSESSMENT — PAIN SCALES - GENERAL
PAINLEVEL_OUTOF10: 0

## 2018-03-21 ASSESSMENT — ENCOUNTER SYMPTOMS
GASTROINTESTINAL NEGATIVE: 1
SORE THROAT: 0
WHEEZING: 0
TROUBLE SWALLOWING: 0
SHORTNESS OF BREATH: 1
VOMITING: 0
FACIAL SWELLING: 0
ALLERGIC/IMMUNOLOGIC NEGATIVE: 1
STRIDOR: 0
CHOKING: 0
APNEA: 0
ABDOMINAL PAIN: 0
EYES NEGATIVE: 1
EYE DISCHARGE: 0
CHEST TIGHTNESS: 1
EYE REDNESS: 0
DIARRHEA: 0
COUGH: 0
SHORTNESS OF BREATH: 0
CHEST TIGHTNESS: 0
NAUSEA: 0

## 2018-03-21 NOTE — H&P
are normal. Pupils are equal, round, and reactive to light. Neck: Trachea normal and normal range of motion. Normal carotid pulses, no hepatojugular reflux and no JVD present. Carotid bruit is not present. No thyromegaly present. Cardiovascular: Normal rate, regular rhythm, S1 normal, S2 normal, normal heart sounds and intact distal pulses. PMI is not displaced. Exam reveals no gallop, no S3, no S4, no distant heart sounds and no friction rub. No murmur heard. Pulmonary/Chest: Effort normal and breath sounds normal. No tachypnea. No respiratory distress. He has no wheezes. He has no rales. He exhibits no tenderness. Abdominal: Soft. Bowel sounds are normal. He exhibits no distension, no ascites and no pulsatile midline mass. There is no tenderness. There is no rigidity, no rebound and no guarding. Musculoskeletal: Normal range of motion. He exhibits no edema or tenderness. Neurological: He is alert and oriented to person, place, and time. No cranial nerve deficit. Skin: Skin is warm. He is not diaphoretic. No erythema. Psychiatric: He has a normal mood and affect. His speech is normal and behavior is normal. Judgment and thought content normal.       Problem List:  NSTEMI- type II secondary to afibb/SVT with RVR  Pafibb- NSR now  PSVT  SSS s/p PPM  Mild CAD  Normal LVF  Morbid obesity  Likely OLIVER  HTN  Hx of tobacco abuse    Plan:      EP consult- sotalol initiated  Lower toprol XL to 50mg daily  Continue with [facility], monitor H/H  Pacer interrogation. Monitor on tele  Check daily EKG and monitor QTC  Keep K+>4 and MG>2  Check 2D Echo for LV function, PA pressures, wall motion abnormalities and any significant valvular disease. Consider outpatient sleep study given body habitus and afibb  Gi/DVT proph  Further recs to follow.     Electronically signed by Dariana Mcgraw DO on 3/21/2018 at 4:05 PM

## 2018-03-21 NOTE — ED PROVIDER NOTES
[address] [facility] ED  eMERGENCY dEPARTMENT eNCOUnter      Pt Name: Rhett Viveros  MRN: [id number]  [place] 1960  Date of evaluation: 3/21/2018  Provider: Rodrigo Hanks MD    CHIEF COMPLAINT       Chief Complaint   Patient presents with    Palpitations         HISTORY OF PRESENT ILLNESS   (Location/Symptom, Timing/Onset, Context/Setting, Quality, Duration, Modifying Factors, Severity)  Note limiting factors. Rhett Viveros is a 56 y.o. male who presents to the emergency department For evaluation secondary to heart palpitations. Patient has no underlying history of cardiac arrhythmia. Patient states that today he had a sudden onset of heart palpitations. Patient states that he was not associated with any sort of chest pain. Patient does have an implanted defibrillator, and sees electrophysiology. He has a history of coronary artery disease. Denies any recent history of DVT or PE. Symptoms are moderate continuous and did not have any sort of exacerbating relieving factors. HPI    Nursing Notes were reviewed. REVIEW OF SYSTEMS    (2-9 systems for level 4, 10 or more for level 5)     Review of Systems   Constitutional: Negative for fever. HENT: Negative for ear pain and sore throat. Respiratory: Negative for cough and shortness of breath. Cardiovascular: Positive for palpitations. Negative for chest pain. Gastrointestinal: Negative for abdominal pain, diarrhea, nausea and vomiting. Genitourinary: Negative for dysuria. Musculoskeletal: Negative for arthralgias. Skin: Negative for rash. Neurological: Negative for weakness and numbness. Except as noted above the remainder of the review of systems was reviewed and negative.        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 Nursing note and vitals reviewed. DIAGNOSTIC RESULTS     EKG: All EKG's are interpreted by the Emergency Department Physician who either signs or Co-signs this chart in the absence of a cardiologist.    Sinus rhythm of 86 with isoelectric ST segments, normal T waves, and a left posterior fascicular block    Repeat EKG: Atrial fibrillation with a rapid ventricular rate of 69. Again isoelectric ST segments normal T waves. No evidence of acute ischemia or arrhythmia. RADIOLOGY:   Non-plain film images such as CT, Ultrasound and MRI are read by the radiologist. Plain radiographic images are visualized and preliminarily interpreted by the emergency physician with the below findings:        Interpretation per the Radiologist below, if available at the time of this note:    No orders to display         ED BEDSIDE ULTRASOUND:   Performed by ED Physician - none    LABS:  Labs Reviewed   BASIC METABOLIC PANEL - Abnormal; Notable for the following:        Result Value    Anion Gap 16 (*)     Glucose 413 (*)     BUN 24 (*)     All other components within normal limits    Narrative:     CALL  [facility]  [facility] tel. [phone],  Critical Glucose called to and read back by [address] [place] [address], 03/21/2018 11:16, by Verla Gottron   CBC WITH AUTO DIFFERENTIAL - Abnormal; Notable for the following:     MCV 79.1 (*)     MCH 26.6 (*)     RDW 14.7 (*)     All other components within normal limits   TROPONIN       All other labs were within normal range or not returned as of this dictation. EMERGENCY DEPARTMENT COURSE and DIFFERENTIAL DIAGNOSIS/MDM:   Vitals:    Vitals:    03/21/18 0953 03/21/18 1153   BP: 124/75 135/88   Pulse: 92 152   Resp: 20 20   SpO2: 96% 94%   Weight: (!) 330 lb (149.7 kg)    Height: 6' 2\" (1.88 m)        Patient presented for evaluation secondary to palpitations.   Patient's prehospital EKG showed a ventricular rate of 217, was regular, and was narrow complex most consistent with a likely supraventricular tachycardia. Patient's initial presentation to the ER however he had spontaneously cardioverted. This was likely due to a combination of vagal maneuvers and adenosine from the paramedics. Patient was evaluated with CBC chemistry and troponin which showed evidence of hyperglycemia. While the patient was in the emergency department however, he redeveloped atrial fibrillation with rapid ventricular rate and was given Lopressor. The patient then converted again back into sinus rhythm. I spoke with Dr Vanessa Albert recommended admission. I discussed this with cardiology and the patient will be admitted. MDM      REASSESSMENT     ED Course          CRITICAL CARE TIME   Total Critical Care time was 40 minutes, excluding separately reportable procedures. There was a high probability of clinically significant/life threatening deterioration in the patient's condition which required my urgent intervention. CONSULTS:  None    PROCEDURES:  Unless otherwise noted below, none     Procedures    FINAL IMPRESSION      1. SVT (supraventricular tachycardia) (HCC)    2. Paroxysmal atrial fibrillation ([address]          DISPOSITION/PLAN   DISPOSITION Decision To Admit 03/21/2018 12:06:28 PM      PATIENT REFERRED TO:  No follow-up provider specified. DISCHARGE MEDICATIONS:  New Prescriptions    No medications on file          (Please note that portions of this note were completed with a voice recognition program.  Efforts were made to edit the dictations but occasionally words are mis-transcribed. )    Filiberto Vivas MD (electronically signed)  Attending Emergency Physician          Filiberto Vivas MD  03/21/18 0317

## 2018-03-21 NOTE — PROGRESS NOTES
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 In er he was is sinus, and went into AF with fast response ( different from the EKG by the squad )  No correctable metabolic abnormality, but for B Sugar. Has been on Xarelto. Will admit, interrogate the device, start on Sotalol 120 mgm po bid, observe  Pt was seen and evaluated in ER.

## 2018-03-22 LAB
EKG ATRIAL RATE: 65 BPM
EKG ATRIAL RATE: 86 BPM
EKG P AXIS: 56 DEGREES
EKG P AXIS: 84 DEGREES
EKG P-R INTERVAL: 134 MS
EKG P-R INTERVAL: 174 MS
EKG Q-T INTERVAL: 364 MS
EKG Q-T INTERVAL: 454 MS
EKG QRS DURATION: 84 MS
EKG QRS DURATION: 92 MS
EKG QTC CALCULATION (BAZETT): 435 MS
EKG QTC CALCULATION (BAZETT): 472 MS
EKG R AXIS: 141 DEGREES
EKG R AXIS: 56 DEGREES
EKG T AXIS: 148 DEGREES
EKG T AXIS: 50 DEGREES
EKG VENTRICULAR RATE: 65 BPM
EKG VENTRICULAR RATE: 86 BPM
GLUCOSE BLD-MCNC: 193 MG/DL (ref 60–115)
GLUCOSE BLD-MCNC: 195 MG/DL (ref 60–115)
GLUCOSE BLD-MCNC: 248 MG/DL (ref 60–115)
GLUCOSE BLD-MCNC: 262 MG/DL (ref 60–115)
HCT VFR BLD CALC: 44.2 % (ref 42–52)
HEMOGLOBIN: 15.1 G/DL (ref 14–18)
LV EF: 60 %
LVEF MODALITY: NORMAL
MAGNESIUM: 1.8 MG/DL (ref 1.7–2.3)
MCH RBC QN AUTO: 26.9 PG (ref 27–31.3)
MCHC RBC AUTO-ENTMCNC: 34.1 % (ref 33–37)
MCV RBC AUTO: 78.8 FL (ref 80–100)
PDW BLD-RTO: 14.7 % (ref 11.5–14.5)
PERFORMED ON: ABNORMAL
PLATELET # BLD: 164 K/UL (ref 130–400)
RBC # BLD: 5.6 M/UL (ref 4.7–6.1)
WBC # BLD: 8 K/UL (ref 4.8–10.8)

## 2018-03-22 PROCEDURE — 6370000000 HC RX 637 (ALT 250 FOR IP): Performed by: INTERNAL MEDICINE

## 2018-03-22 PROCEDURE — 2060000000 HC ICU INTERMEDIATE R&B

## 2018-03-22 PROCEDURE — 93005 ELECTROCARDIOGRAM TRACING: CPT

## 2018-03-22 PROCEDURE — 6360000002 HC RX W HCPCS: Performed by: NURSE PRACTITIONER

## 2018-03-22 PROCEDURE — 83735 ASSAY OF MAGNESIUM: CPT

## 2018-03-22 PROCEDURE — 6370000000 HC RX 637 (ALT 250 FOR IP): Performed by: SPECIALIST

## 2018-03-22 PROCEDURE — 36415 COLL VENOUS BLD VENIPUNCTURE: CPT

## 2018-03-22 PROCEDURE — 99232 SBSQ HOSP IP/OBS MODERATE 35: CPT | Performed by: INTERNAL MEDICINE

## 2018-03-22 PROCEDURE — 6370000000 HC RX 637 (ALT 250 FOR IP): Performed by: NURSE PRACTITIONER

## 2018-03-22 PROCEDURE — 85027 COMPLETE CBC AUTOMATED: CPT

## 2018-03-22 PROCEDURE — C8929 TTE W OR WO FOL WCON,DOPPLER: HCPCS

## 2018-03-22 PROCEDURE — 2580000003 HC RX 258: Performed by: NURSE PRACTITIONER

## 2018-03-22 RX ORDER — MAGNESIUM SULFATE IN WATER 40 MG/ML
2 INJECTION, SOLUTION INTRAVENOUS ONCE
Status: COMPLETED | OUTPATIENT
Start: 2018-03-22 | End: 2018-03-22

## 2018-03-22 RX ADMIN — MAGNESIUM SULFATE HEPTAHYDRATE 2 G: 40 INJECTION, SOLUTION INTRAVENOUS at 09:08

## 2018-03-22 RX ADMIN — LOSARTAN POTASSIUM AND HYDROCHLOROTHIAZIDE 2 TABLET: 12.5; 5 TABLET ORAL at 08:57

## 2018-03-22 RX ADMIN — INSULIN LISPRO 2 UNITS: 100 INJECTION, SOLUTION INTRAVENOUS; SUBCUTANEOUS at 17:14

## 2018-03-22 RX ADMIN — ISOSORBIDE MONONITRATE 30 MG: 30 TABLET, EXTENDED RELEASE ORAL at 08:57

## 2018-03-22 RX ADMIN — SOTALOL HYDROCHLORIDE 120 MG: 120 TABLET ORAL at 21:30

## 2018-03-22 RX ADMIN — METFORMIN HYDROCHLORIDE 1000 MG: 500 TABLET, EXTENDED RELEASE ORAL at 17:13

## 2018-03-22 RX ADMIN — ATORVASTATIN CALCIUM 80 MG: 80 TABLET, FILM COATED ORAL at 21:30

## 2018-03-22 RX ADMIN — Medication 10 ML: at 21:31

## 2018-03-22 RX ADMIN — SPIRONOLACTONE 50 MG: 50 TABLET ORAL at 08:57

## 2018-03-22 RX ADMIN — INSULIN GLARGINE 10 UNITS: 100 INJECTION, SOLUTION SUBCUTANEOUS at 21:31

## 2018-03-22 RX ADMIN — INSULIN LISPRO 6 UNITS: 100 INJECTION, SOLUTION INTRAVENOUS; SUBCUTANEOUS at 13:28

## 2018-03-22 RX ADMIN — Medication 10 ML: at 08:58

## 2018-03-22 RX ADMIN — GLIPIZIDE 10 MG: 10 TABLET ORAL at 06:52

## 2018-03-22 RX ADMIN — SOTALOL HYDROCHLORIDE 120 MG: 120 TABLET ORAL at 08:57

## 2018-03-22 RX ADMIN — INSULIN LISPRO 4 UNITS: 100 INJECTION, SOLUTION INTRAVENOUS; SUBCUTANEOUS at 08:59

## 2018-03-22 RX ADMIN — METFORMIN HYDROCHLORIDE 1000 MG: 500 TABLET, EXTENDED RELEASE ORAL at 08:57

## 2018-03-22 RX ADMIN — METOPROLOL SUCCINATE 50 MG: 50 TABLET, FILM COATED, EXTENDED RELEASE ORAL at 08:57

## 2018-03-22 RX ADMIN — ASPIRIN 81 MG 81 MG: 81 TABLET ORAL at 08:57

## 2018-03-22 RX ADMIN — RIVAROXABAN 20 MG: 20 TABLET, FILM COATED ORAL at 17:13

## 2018-03-22 ASSESSMENT — ENCOUNTER SYMPTOMS
SHORTNESS OF BREATH: 0
WHEEZING: 0

## 2018-03-22 ASSESSMENT — PAIN SCALES - GENERAL
PAINLEVEL_OUTOF10: 0

## 2018-03-22 NOTE — PROGRESS NOTES
Progress Note  Patient: Kita Rossi  Unit/Bed: M283/S773-61  YOB: 1960  MRN: [id number]  Acct: [de-identified]   Admitting Diagnosis: A-fib [facility]) [I48.91]  Admit Date:  3/21/2018  Hospital Day: 1    Chief Complaint:  A-fib with rvr    Subjective  3/22/18  Patient's awake alert and oriented ×3. Telemetry is normal sinus rhythm he states he feels much better at this time. Pt denies chest pain, shortness of breath, nausea, vomiting, diarrhea, constipation, motor weakness, insomnia, weight loss, PND, orthopnea, or claudication. 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 While in the emergency department and he went into A. fib with RVR they gave him 1 dose of IV Lopressor and he converted to normal sinus rhythm. Patient has a history of PPM, hypertension, dyslipidemia, diabetes, remote history of tobacco abuse, obesity with a BMI gradient 40%. EKG did show that he was in A. fib with RVR. He is now in normal sinus rhythm his QTC is within normal limits and they did do a pacemaker check.   So he is chest pain-free at this time no fever, chills, nausea, vomiting, PND, thymic, claudication        Accession  [id number]  Room       CATHLABPOOL         Date of Study  01/04/2018   Number                Number   Referring  Diane Matos        Interventional   Physician  Desiree     Physician  Rodrigo Irizarry MD   Procedure  Procedure Type   Diagnostic procedure:[facility] CORS  Complications: No Complications.   Conclusions  Recommendations  Based on the results of this angiogram, medical management is 5.36 04/30/2012    HGB 15.1 03/22/2018    HCT 44.2 03/22/2018     03/22/2018    MCV 78.8 03/22/2018    MCH 26.9 03/22/2018    MCHC 34.1 03/22/2018    RDW 14.7 03/22/2018    LYMPHOPCT 16.4 03/21/2018    MONOPCT 6.6 03/21/2018    EOSPCT 4.7 04/30/2012    BASOPCT 0.4 03/21/2018    MONOSABS 0.5 03/21/2018    LYMPHSABS 1.3 03/21/2018    EOSABS 0.2 03/21/2018    BASOSABS 0.0 03/21/2018     CMP:    Lab Results   Component Value Date     03/21/2018    K 4.5 03/21/2018    CL 98 03/21/2018    CO2 22 03/21/2018    BUN 24 03/21/2018    CREATININE 0.95 03/21/2018    GFRAA >60.0 03/21/2018    LABGLOM >60.0 03/21/2018    GLUCOSE 413 03/21/2018    GLUCOSE 146 04/30/2012    PROT 7.1 12/26/2017    LABALBU 4.5 12/26/2017    LABALBU 4.5 04/30/2012    CALCIUM 9.3 03/21/2018    BILITOT 0.6 12/26/2017    ALKPHOS 63 12/26/2017    AST 21 12/26/2017    ALT 30 12/26/2017     BMP:    Lab Results   Component Value Date     03/21/2018    K 4.5 03/21/2018    CL 98 03/21/2018    CO2 22 03/21/2018    BUN 24 03/21/2018    LABALBU 4.5 12/26/2017    LABALBU 4.5 04/30/2012    CREATININE 0.95 03/21/2018    CALCIUM 9.3 03/21/2018    GFRAA >60.0 03/21/2018    LABGLOM >60.0 03/21/2018    GLUCOSE 413 03/21/2018    GLUCOSE 146 04/30/2012     Magnesium:    Lab Results   Component Value Date    MG 1.8 03/22/2018     Troponin:    Lab Results   Component Value Date    TROPONINI 0.106 03/21/2018       EKG:  NSR qtc 472    Assessment:    Active Hospital Problems    Diagnosis Date Noted    A-fib [facility]) [I48.91] 03/21/2018     Priority: High    Heart palpitations [R00.2] 10/12/2016     Priority: Low    DM (diabetes mellitus) ([facility] [E11.9] 01/29/2014     Priority: Low    BMI 40.0-44.9, adult ([facility] [Z68.41] 01/29/2014     Priority: Low    Hypertension [I10]      Priority: Low       Elevated trop secondary to a-fib with rvr     Plan:  1. Tele monitoring  2. Sotalol 120 mg po bid  3. Monitor magnesium levels and keep greater 2.0  4.  Monitor potassium levels and keep greater than 4.0  5. Patient had normal heart cath 1/18  Electronically signed by Gloria Olson NP on 3/22/2018 at 8:30 AM      Attending Supervising [de-identified] Attestation Statement  The patient is a 56 y.o. male. I have performed a history and physical examination of the patient. I discussed the case with the nurse practitioner. I reviewed the patient's Past Medical History, Past Surgical History, Medications, and Allergies. Physical Exam:  Vitals:    03/21/18 1422 03/21/18 1434 03/21/18 1906 03/21/18 2220   BP: 127/71  132/79 125/75   Pulse: 81  69    Resp: 18  18    Temp: 97.7 °F (36.5 °C)  97.9 °F (36.6 °C)    TempSrc: Oral  Oral    SpO2: 96%  96%    Weight:  (!) 333 lb 12.4 oz (151.4 kg)     Height:           Review of Systems - Respiratory ROS: no cough, shortness of breath, or wheezing  Cardiovascular ROS: no chest pain or dyspnea on exertion  Gastrointestinal ROS: no abdominal pain, change in bowel habits, or black or bloody stools    Pulmonary/Chest: clear to auscultation bilaterally- no wheezes, rales or rhonchi, normal air movement, no respiratory distress  Cardiovascular: normal rate, normal S1 and S2, no gallops, intact distal pulses and no carotid bruits  Abdomen: soft, non-tender, non-distended, normal bowel sounds, no masses or organomegaly    Active Hospital Problems    Diagnosis Date Noted    A-fib [facility]) [I48.91] 03/21/2018     Priority: High    Heart palpitations [R00.2] 10/12/2016     Priority: Low    DM (diabetes mellitus) ([facility] [E11.9] 01/29/2014     Priority: Low    BMI 40.0-44.9, adult [facility]) [Z68.41] 01/29/2014     Priority: Low    Hypertension [I10]      Priority: Low        I reviewed and agree with the findings and plan documented in his note . Impression/Plan     1. PSVT w cp STABLE NOW. SR rate 60 tolerating Sotalol well. QTc 472  2.  Recent Cath mild- ASA statin       Electronically signed by Joel Holland MD on 3/22/18 at 9:13 AM

## 2018-03-23 VITALS
DIASTOLIC BLOOD PRESSURE: 72 MMHG | HEIGHT: 74 IN | TEMPERATURE: 97.2 F | OXYGEN SATURATION: 95 % | BODY MASS INDEX: 40.43 KG/M2 | HEART RATE: 74 BPM | SYSTOLIC BLOOD PRESSURE: 118 MMHG | WEIGHT: 315 LBS | RESPIRATION RATE: 18 BRPM

## 2018-03-23 LAB
GLUCOSE BLD-MCNC: 234 MG/DL (ref 60–115)
GLUCOSE BLD-MCNC: 279 MG/DL (ref 60–115)
PERFORMED ON: ABNORMAL
PERFORMED ON: ABNORMAL

## 2018-03-23 PROCEDURE — 99238 HOSP IP/OBS DSCHRG MGMT 30/<: CPT | Performed by: INTERNAL MEDICINE

## 2018-03-23 PROCEDURE — 6370000000 HC RX 637 (ALT 250 FOR IP): Performed by: INTERNAL MEDICINE

## 2018-03-23 PROCEDURE — 2580000003 HC RX 258: Performed by: NURSE PRACTITIONER

## 2018-03-23 PROCEDURE — 6370000000 HC RX 637 (ALT 250 FOR IP): Performed by: NURSE PRACTITIONER

## 2018-03-23 PROCEDURE — 6370000000 HC RX 637 (ALT 250 FOR IP): Performed by: SPECIALIST

## 2018-03-23 RX ORDER — SOTALOL HYDROCHLORIDE 120 MG/1
120 TABLET ORAL 2 TIMES DAILY
Qty: 60 TABLET | Refills: 1 | Status: SHIPPED | OUTPATIENT
Start: 2018-03-23 | End: 2018-05-21 | Stop reason: SDUPTHER

## 2018-03-23 RX ADMIN — ISOSORBIDE MONONITRATE 30 MG: 30 TABLET, EXTENDED RELEASE ORAL at 08:23

## 2018-03-23 RX ADMIN — Medication 10 ML: at 08:27

## 2018-03-23 RX ADMIN — SPIRONOLACTONE 50 MG: 50 TABLET ORAL at 08:22

## 2018-03-23 RX ADMIN — LOSARTAN POTASSIUM AND HYDROCHLOROTHIAZIDE 2 TABLET: 12.5; 5 TABLET ORAL at 08:23

## 2018-03-23 RX ADMIN — METOPROLOL SUCCINATE 50 MG: 50 TABLET, FILM COATED, EXTENDED RELEASE ORAL at 08:22

## 2018-03-23 RX ADMIN — METFORMIN HYDROCHLORIDE 1000 MG: 500 TABLET, EXTENDED RELEASE ORAL at 08:22

## 2018-03-23 RX ADMIN — GLIPIZIDE 10 MG: 10 TABLET ORAL at 06:06

## 2018-03-23 RX ADMIN — ASPIRIN 81 MG 81 MG: 81 TABLET ORAL at 08:23

## 2018-03-23 RX ADMIN — INSULIN LISPRO 6 UNITS: 100 INJECTION, SOLUTION INTRAVENOUS; SUBCUTANEOUS at 13:07

## 2018-03-23 RX ADMIN — SOTALOL HYDROCHLORIDE 120 MG: 120 TABLET ORAL at 08:23

## 2018-03-23 RX ADMIN — INSULIN LISPRO 4 UNITS: 100 INJECTION, SOLUTION INTRAVENOUS; SUBCUTANEOUS at 08:23

## 2018-03-23 NOTE — DISCHARGE SUMMARY
Does not apply route daily  Qty: 100 each, Refills: 3    Associated Diagnoses: Type 2 diabetes mellitus without complication, unspecified long term insulin use status (HCC)      nitroGLYCERIN (NITROSTAT) 0.4 MG SL tablet up to max of 3 total doses. If no relief after 1 dose, call 911. Qty: 25 tablet, Refills: 3      !! NOVOFINE 30G X 8 MM MISC USE DAILY AS DIRECTED  Qty: 200 each, Refills: 2      !! Insulin Pen Needle (NOVOFINE) 30G X 8 MM MISC Uses 1 daily to inject insulin  Qty: 100 each, Refills: 0      ONE TOUCH ULTRA TEST strip Check sugar once daily  Qty: 100 strip, Refills: 3       !! - Potential duplicate medications found. Please discuss with provider. Significant Diagnostics:   Radiology: No results found.     Labs:   Recent Results (from the past 72 hour(s))   EKG 12 Lead    Collection Time: 03/21/18  9:53 AM   Result Value Ref Range    Ventricular Rate 86 BPM    Atrial Rate 86 BPM    P-R Interval 134 ms    QRS Duration 84 ms    Q-T Interval 364 ms    QTc Calculation (Bazett) 435 ms    P Axis 84 degrees    R Axis 141 degrees    T Axis 148 degrees   Basic Metabolic Panel    Collection Time: 03/21/18 10:00 AM   Result Value Ref Range    Sodium 136 132 - 144 mEq/L    Potassium 4.5 3.5 - 5.1 mEq/L    Chloride 98 98 - 107 mEq/L    CO2 22 22 - 29 mEq/L    Anion Gap 16 (H) 7 - 13 mEq/L    Glucose 413 (HH) 74 - 109 mg/dL    BUN 24 (H) 6 - 20 mg/dL    CREATININE 0.95 0.70 - 1.20 mg/dL    GFR Non-African American >60.0 >60    GFR  >60.0 >60    Calcium 9.3 8.6 - 10.2 mg/dL   CBC Auto Differential    Collection Time: 03/21/18 10:00 AM   Result Value Ref Range    WBC 7.8 4.8 - 10.8 K/uL    RBC 5.59 4.70 - 6.10 M/uL    Hemoglobin 14.9 14.0 - 18.0 g/dL    Hematocrit 44.2 42.0 - 52.0 %    MCV 79.1 (L) 80.0 - 100.0 fL    MCH 26.6 (L) 27.0 - 31.3 pg    MCHC 33.7 33.0 - 37.0 %    RDW 14.7 (H) 11.5 - 14.5 %    Platelets 858 074 - 383 K/uL    Neutrophils % 74.5 %    Lymphocytes % 16.4 %    Monocytes % 115 mg/dl    Performed on ACCU-CHEK    POCT Glucose    Collection Time: 03/22/18 12:38 PM   Result Value Ref Range    POC Glucose 262 (H) 60 - 115 mg/dl    Performed on ACCU-CHEK    POCT Glucose    Collection Time: 03/22/18  4:26 PM   Result Value Ref Range    POC Glucose 193 (H) 60 - 115 mg/dl    Performed on ACCU-CHEK    POCT Glucose    Collection Time: 03/22/18  8:09 PM   Result Value Ref Range    POC Glucose 195 (H) 60 - 115 mg/dl    Performed on ACCU-CHEK    POCT Glucose    Collection Time: 03/23/18  6:38 AM   Result Value Ref Range    POC Glucose 234 (H) 60 - 115 mg/dl    Performed on ACCU-CHEK            Follow-up visits:   Anam Ontiveros MD  [address] [place] [address]  [facility], [address] [place] [address], [address] [place] [address] Road  [phone]    Schedule an appointment as soon as possible for a visit in 1 week         [facility]    Diagnosis Date Noted    SVT (supraventricular tachycardia) ([facility] [I47.1]      Priority: High    A-fib [facility]) [I48.91] 03/21/2018     Priority: High    Heart palpitations [R00.2] 10/12/2016     Priority: Low    DM (diabetes mellitus) ([facility] [E11.9] 01/29/2014     Priority: Low    BMI 40.0-44.9, adult ([facility] [Z68.41] 01/29/2014     Priority: Low    Hypertension [I10]      Priority: Low     1.  f/u Dr. Bertha Munoz 1 week for ECG and continued CV care.                Electronically signed by Nan Yang MD on 3/23/2018 at 7:36 AM

## 2018-03-26 ENCOUNTER — TELEPHONE (OUTPATIENT)
Dept: FAMILY MEDICINE CLINIC | Age: 58
End: 2018-03-26

## 2018-03-26 ENCOUNTER — TELEPHONE (OUTPATIENT)
Dept: CARDIOLOGY CLINIC | Age: 58
End: 2018-03-26

## 2018-03-26 NOTE — TELEPHONE ENCOUNTER
Deborah Lamar 1626 IP Discharge Follow up Call    Date of discharge: 3-23-18  Facility: [facility]  Non-face-to-face services provided:  Obtained and reviewed discharge summary and/or continuity of care documents    Reason for Hospital Visit:  A-fib  Discharged with [address]?:  No    Date of first visit after discharge:  4-4-18 Dr Dayday Guardado. Declines appt with PCP at this time and will call at a later day to reschedule. Status:     improved    Did you receive a discharge summary with list of medication from the hospital? Yes  Review of Instructions:     Understands what to report/when to return?:  Yes   Understands discharge instructions?:  Yes   Following discharge instructions?:  Yes   If not why? Is there any lingering symptoms? No  Are you eating and drinking OK? Yes  Any other problems i.e. Constipation, other symptoms? No  Are there any new complaints of pain? No  If you have a wound is the dressing clean, dry, and intact? N/A-none per AVS  Understands wound care regimen? N/A  Are there any other complaints/concerns that you wish to tell your provider? Patient states he is doing very well since discharge. He denies chest pain, palpitations, shortness of breath, lightheadedness and dizziness and diaphoresis since discharge. He is resting and catching up on his sleep. FU appts/Provider:    Future Appointments  Date Time Provider Janes Isaacs   4/4/2018 11:15 AM Tara Rios MD [address]       New Medications at discharge?:     Yes                      Medication Reconciliation by phone -     Each medication was reviewed (both pre and post hospitalization)  Yes    Were there discrepancies in medications? No  If YES, were discrepancies addressed? Not Applicable    Understands Medications? Yes   Questions about medications from pt or caregiver? No   Questions addressed?  Not Applicable                Has all of medication and/or prescriptions   Yes  Taking Medications? Yes  Can you swallow your pills? Yes    Is the patient having any trouble with ADL's or IADL's? No  Is the patient able to move around as expected? Yes  Needs Equipment?   No    Link to services in community?:  N/A   Which services:

## 2018-04-04 ENCOUNTER — OFFICE VISIT (OUTPATIENT)
Dept: CARDIOLOGY CLINIC | Age: 58
End: 2018-04-04
Payer: COMMERCIAL

## 2018-04-04 VITALS
HEART RATE: 76 BPM | BODY MASS INDEX: 40.43 KG/M2 | HEIGHT: 74 IN | DIASTOLIC BLOOD PRESSURE: 76 MMHG | RESPIRATION RATE: 12 BRPM | SYSTOLIC BLOOD PRESSURE: 110 MMHG | WEIGHT: 315 LBS

## 2018-04-04 DIAGNOSIS — I47.1 SVT (SUPRAVENTRICULAR TACHYCARDIA) (HCC): ICD-10-CM

## 2018-04-04 DIAGNOSIS — I21.4 NSTEMI (NON-ST ELEVATED MYOCARDIAL INFARCTION) (HCC): Primary | ICD-10-CM

## 2018-04-04 DIAGNOSIS — I48.0 PAROXYSMAL ATRIAL FIBRILLATION (HCC): ICD-10-CM

## 2018-04-04 DIAGNOSIS — Z95.0 S/P CARDIAC PACEMAKER PROCEDURE: ICD-10-CM

## 2018-04-04 DIAGNOSIS — Z79.899 ENCOUNTER FOR MONITORING SOTALOL THERAPY: ICD-10-CM

## 2018-04-04 DIAGNOSIS — Z51.81 ENCOUNTER FOR MONITORING SOTALOL THERAPY: ICD-10-CM

## 2018-04-04 PROCEDURE — 1111F DSCHRG MED/CURRENT MED MERGE: CPT | Performed by: INTERNAL MEDICINE

## 2018-04-04 PROCEDURE — G8427 DOCREV CUR MEDS BY ELIG CLIN: HCPCS | Performed by: INTERNAL MEDICINE

## 2018-04-04 PROCEDURE — G8417 CALC BMI ABV UP PARAM F/U: HCPCS | Performed by: INTERNAL MEDICINE

## 2018-04-04 PROCEDURE — 1036F TOBACCO NON-USER: CPT | Performed by: INTERNAL MEDICINE

## 2018-04-04 PROCEDURE — G8598 ASA/ANTIPLAT THER USED: HCPCS | Performed by: INTERNAL MEDICINE

## 2018-04-04 PROCEDURE — 99214 OFFICE O/P EST MOD 30 MIN: CPT | Performed by: INTERNAL MEDICINE

## 2018-04-04 PROCEDURE — 3017F COLORECTAL CA SCREEN DOC REV: CPT | Performed by: INTERNAL MEDICINE

## 2018-04-04 ASSESSMENT — ENCOUNTER SYMPTOMS
CHEST TIGHTNESS: 0
SHORTNESS OF BREATH: 1
COLOR CHANGE: 0
APNEA: 0
COUGH: 0

## 2018-04-12 ENCOUNTER — TELEPHONE (OUTPATIENT)
Dept: CARDIOLOGY CLINIC | Age: 58
End: 2018-04-12

## 2018-04-17 RX ORDER — METFORMIN HYDROCHLORIDE 500 MG/1
TABLET, EXTENDED RELEASE ORAL
Qty: 120 TABLET | Refills: 0 | Status: SHIPPED | OUTPATIENT
Start: 2018-04-17 | End: 2018-05-23 | Stop reason: SDUPTHER

## 2018-04-17 RX ORDER — METFORMIN HYDROCHLORIDE 500 MG/1
TABLET, EXTENDED RELEASE ORAL
Qty: 120 TABLET | Refills: 0 | OUTPATIENT
Start: 2018-04-17

## 2018-04-21 DIAGNOSIS — I10 ESSENTIAL HYPERTENSION: ICD-10-CM

## 2018-04-22 DIAGNOSIS — I10 ESSENTIAL HYPERTENSION: ICD-10-CM

## 2018-04-22 RX ORDER — METOPROLOL SUCCINATE 100 MG/1
TABLET, EXTENDED RELEASE ORAL
Qty: 30 TABLET | Refills: 0 | Status: SHIPPED | OUTPATIENT
Start: 2018-04-22 | End: 2018-06-11 | Stop reason: SDUPTHER

## 2018-04-23 RX ORDER — METOPROLOL SUCCINATE 100 MG/1
TABLET, EXTENDED RELEASE ORAL
Qty: 30 TABLET | Refills: 0 | Status: SHIPPED | OUTPATIENT
Start: 2018-04-23 | End: 2018-06-15 | Stop reason: SDUPTHER

## 2018-05-02 RX ORDER — ATORVASTATIN CALCIUM 80 MG/1
80 TABLET, FILM COATED ORAL NIGHTLY
Qty: 30 TABLET | Refills: 5 | Status: SHIPPED | OUTPATIENT
Start: 2018-05-02 | End: 2018-11-02 | Stop reason: SDUPTHER

## 2018-05-21 ENCOUNTER — TELEPHONE (OUTPATIENT)
Dept: FAMILY MEDICINE CLINIC | Age: 58
End: 2018-05-21

## 2018-05-21 DIAGNOSIS — E11.8 TYPE 2 DIABETES MELLITUS WITH COMPLICATION, UNSPECIFIED LONG TERM INSULIN USE STATUS: ICD-10-CM

## 2018-05-21 DIAGNOSIS — Z12.5 PROSTATE CANCER SCREENING: ICD-10-CM

## 2018-05-21 DIAGNOSIS — E78.2 MIXED HYPERLIPIDEMIA: ICD-10-CM

## 2018-05-21 DIAGNOSIS — I10 ESSENTIAL HYPERTENSION: Primary | ICD-10-CM

## 2018-05-21 RX ORDER — SOTALOL HYDROCHLORIDE 120 MG/1
120 TABLET ORAL 2 TIMES DAILY
Qty: 60 TABLET | Refills: 11 | Status: SHIPPED | OUTPATIENT
Start: 2018-05-21 | End: 2019-03-05 | Stop reason: ALTCHOICE

## 2018-05-23 DIAGNOSIS — Z12.5 PROSTATE CANCER SCREENING: ICD-10-CM

## 2018-05-23 DIAGNOSIS — I10 ESSENTIAL HYPERTENSION: ICD-10-CM

## 2018-05-23 DIAGNOSIS — E78.2 MIXED HYPERLIPIDEMIA: ICD-10-CM

## 2018-05-23 DIAGNOSIS — E11.8 TYPE 2 DIABETES MELLITUS WITH COMPLICATION, UNSPECIFIED LONG TERM INSULIN USE STATUS: ICD-10-CM

## 2018-05-23 LAB
ALBUMIN SERPL-MCNC: 4.5 G/DL (ref 3.9–4.9)
ALP BLD-CCNC: 84 U/L (ref 35–104)
ALT SERPL-CCNC: 18 U/L (ref 0–41)
ANION GAP SERPL CALCULATED.3IONS-SCNC: 16 MEQ/L (ref 7–13)
AST SERPL-CCNC: 11 U/L (ref 0–40)
BASOPHILS ABSOLUTE: 0.1 K/UL (ref 0–0.2)
BASOPHILS RELATIVE PERCENT: 0.7 %
BILIRUB SERPL-MCNC: 0.5 MG/DL (ref 0–1.2)
BUN BLDV-MCNC: 19 MG/DL (ref 6–20)
CALCIUM SERPL-MCNC: 9.4 MG/DL (ref 8.6–10.2)
CHLORIDE BLD-SCNC: 99 MEQ/L (ref 98–107)
CHOLESTEROL, TOTAL: 124 MG/DL (ref 0–199)
CO2: 24 MEQ/L (ref 22–29)
CREAT SERPL-MCNC: 0.65 MG/DL (ref 0.7–1.2)
EOSINOPHILS ABSOLUTE: 0.3 K/UL (ref 0–0.7)
EOSINOPHILS RELATIVE PERCENT: 3.7 %
GFR AFRICAN AMERICAN: >60
GFR NON-AFRICAN AMERICAN: >60
GLOBULIN: 2.1 G/DL (ref 2.3–3.5)
GLUCOSE BLD-MCNC: 290 MG/DL (ref 74–109)
HBA1C MFR BLD: 11.1 % (ref 4.8–5.9)
HCT VFR BLD CALC: 43.7 % (ref 42–52)
HDLC SERPL-MCNC: 41 MG/DL (ref 40–59)
HEMOGLOBIN: 14.7 G/DL (ref 14–18)
LDL CHOLESTEROL CALCULATED: 58 MG/DL (ref 0–129)
LYMPHOCYTES ABSOLUTE: 1.7 K/UL (ref 1–4.8)
LYMPHOCYTES RELATIVE PERCENT: 21.6 %
MCH RBC QN AUTO: 26.6 PG (ref 27–31.3)
MCHC RBC AUTO-ENTMCNC: 33.7 % (ref 33–37)
MCV RBC AUTO: 79.1 FL (ref 80–100)
MONOCYTES ABSOLUTE: 0.6 K/UL (ref 0.2–0.8)
MONOCYTES RELATIVE PERCENT: 7.9 %
NEUTROPHILS ABSOLUTE: 5.1 K/UL (ref 1.4–6.5)
NEUTROPHILS RELATIVE PERCENT: 66.1 %
PDW BLD-RTO: 14.5 % (ref 11.5–14.5)
PLATELET # BLD: 190 K/UL (ref 130–400)
POTASSIUM SERPL-SCNC: 4.4 MEQ/L (ref 3.5–5.1)
PROSTATE SPECIFIC ANTIGEN: 1.06 NG/ML (ref 0–3.89)
RBC # BLD: 5.53 M/UL (ref 4.7–6.1)
SODIUM BLD-SCNC: 139 MEQ/L (ref 132–144)
TOTAL PROTEIN: 6.6 G/DL (ref 6.4–8.1)
TRIGL SERPL-MCNC: 126 MG/DL (ref 0–200)
WBC # BLD: 7.7 K/UL (ref 4.8–10.8)

## 2018-05-23 RX ORDER — METFORMIN HYDROCHLORIDE 500 MG/1
TABLET, EXTENDED RELEASE ORAL
Qty: 8 TABLET | Refills: 0 | Status: SHIPPED | OUTPATIENT
Start: 2018-05-23 | End: 2018-06-11 | Stop reason: SDUPTHER

## 2018-05-25 ENCOUNTER — OFFICE VISIT (OUTPATIENT)
Dept: FAMILY MEDICINE CLINIC | Age: 58
End: 2018-05-25
Payer: COMMERCIAL

## 2018-05-25 VITALS
SYSTOLIC BLOOD PRESSURE: 130 MMHG | WEIGHT: 315 LBS | HEIGHT: 74 IN | TEMPERATURE: 98.6 F | BODY MASS INDEX: 40.43 KG/M2 | DIASTOLIC BLOOD PRESSURE: 82 MMHG | RESPIRATION RATE: 16 BRPM | HEART RATE: 76 BPM | OXYGEN SATURATION: 98 %

## 2018-05-25 DIAGNOSIS — M25.511 CHRONIC RIGHT SHOULDER PAIN: ICD-10-CM

## 2018-05-25 DIAGNOSIS — I10 ESSENTIAL HYPERTENSION: Primary | ICD-10-CM

## 2018-05-25 DIAGNOSIS — G89.29 CHRONIC RIGHT SHOULDER PAIN: ICD-10-CM

## 2018-05-25 DIAGNOSIS — I25.119 CORONARY ARTERY DISEASE INVOLVING NATIVE CORONARY ARTERY OF NATIVE HEART WITH ANGINA PECTORIS (HCC): ICD-10-CM

## 2018-05-25 DIAGNOSIS — E11.8 TYPE 2 DIABETES MELLITUS WITH COMPLICATION, UNSPECIFIED LONG TERM INSULIN USE STATUS: ICD-10-CM

## 2018-05-25 DIAGNOSIS — E78.2 MIXED HYPERLIPIDEMIA: ICD-10-CM

## 2018-05-25 PROCEDURE — 2022F DILAT RTA XM EVC RTNOPTHY: CPT | Performed by: FAMILY MEDICINE

## 2018-05-25 PROCEDURE — G8427 DOCREV CUR MEDS BY ELIG CLIN: HCPCS | Performed by: FAMILY MEDICINE

## 2018-05-25 PROCEDURE — 3017F COLORECTAL CA SCREEN DOC REV: CPT | Performed by: FAMILY MEDICINE

## 2018-05-25 PROCEDURE — G8417 CALC BMI ABV UP PARAM F/U: HCPCS | Performed by: FAMILY MEDICINE

## 2018-05-25 PROCEDURE — 99214 OFFICE O/P EST MOD 30 MIN: CPT | Performed by: FAMILY MEDICINE

## 2018-05-25 PROCEDURE — G8598 ASA/ANTIPLAT THER USED: HCPCS | Performed by: FAMILY MEDICINE

## 2018-05-25 PROCEDURE — 1036F TOBACCO NON-USER: CPT | Performed by: FAMILY MEDICINE

## 2018-05-25 PROCEDURE — 3046F HEMOGLOBIN A1C LEVEL >9.0%: CPT | Performed by: FAMILY MEDICINE

## 2018-05-25 RX ORDER — METFORMIN HYDROCHLORIDE 500 MG/1
TABLET, EXTENDED RELEASE ORAL
Qty: 120 TABLET | Refills: 0 | Status: SHIPPED | OUTPATIENT
Start: 2018-05-25 | End: 2018-06-21 | Stop reason: SDUPTHER

## 2018-05-25 RX ORDER — PANTOPRAZOLE SODIUM 20 MG/1
20 TABLET, DELAYED RELEASE ORAL DAILY
COMMUNITY
End: 2018-07-10 | Stop reason: ALTCHOICE

## 2018-05-25 ASSESSMENT — ENCOUNTER SYMPTOMS
ABDOMINAL PAIN: 0
SHORTNESS OF BREATH: 0

## 2018-06-11 ENCOUNTER — TELEPHONE (OUTPATIENT)
Dept: FAMILY MEDICINE CLINIC | Age: 58
End: 2018-06-11

## 2018-06-11 RX ORDER — LOSARTAN POTASSIUM AND HYDROCHLOROTHIAZIDE 25; 100 MG/1; MG/1
TABLET ORAL
Qty: 90 TABLET | Refills: 0 | Status: SHIPPED | OUTPATIENT
Start: 2018-06-11 | End: 2018-08-04 | Stop reason: SDUPTHER

## 2018-06-11 RX ORDER — GLIPIZIDE 10 MG/1
TABLET, FILM COATED, EXTENDED RELEASE ORAL
Qty: 180 TABLET | Refills: 0 | Status: SHIPPED | OUTPATIENT
Start: 2018-06-11 | End: 2018-08-04 | Stop reason: SDUPTHER

## 2018-06-11 RX ORDER — CANAGLIFLOZIN 100 MG/1
TABLET, FILM COATED ORAL
Qty: 90 TABLET | Refills: 0 | Status: SHIPPED | OUTPATIENT
Start: 2018-06-11 | End: 2018-08-04 | Stop reason: SDUPTHER

## 2018-06-14 ENCOUNTER — HOSPITAL ENCOUNTER (OUTPATIENT)
Dept: PHYSICAL THERAPY | Age: 58
Setting detail: THERAPIES SERIES
Discharge: HOME | End: 2018-06-14
Payer: COMMERCIAL

## 2018-06-14 PROCEDURE — 97161 PT EVAL LOW COMPLEX 20 MIN: CPT

## 2018-06-14 ASSESSMENT — PAIN DESCRIPTION - ORIENTATION: ORIENTATION: RIGHT

## 2018-06-14 ASSESSMENT — PAIN DESCRIPTION - LOCATION: LOCATION: SHOULDER

## 2018-06-14 ASSESSMENT — PAIN SCALES - GENERAL: PAINLEVEL_OUTOF10: 3

## 2018-06-14 ASSESSMENT — PAIN DESCRIPTION - PAIN TYPE: TYPE: CHRONIC PAIN

## 2018-06-14 ASSESSMENT — PAIN DESCRIPTION - DESCRIPTORS: DESCRIPTORS: TIGHTNESS

## 2018-06-21 ENCOUNTER — HOSPITAL ENCOUNTER (OUTPATIENT)
Dept: PHYSICAL THERAPY | Age: 58
Setting detail: THERAPIES SERIES
Discharge: HOME | End: 2018-06-21
Payer: COMMERCIAL

## 2018-06-21 PROCEDURE — 97110 THERAPEUTIC EXERCISES: CPT

## 2018-06-21 PROCEDURE — 97140 MANUAL THERAPY 1/> REGIONS: CPT

## 2018-06-21 RX ORDER — METFORMIN HYDROCHLORIDE 500 MG/1
TABLET, EXTENDED RELEASE ORAL
Qty: 120 TABLET | Refills: 0 | Status: SHIPPED | OUTPATIENT
Start: 2018-06-21 | End: 2018-07-23 | Stop reason: SDUPTHER

## 2018-06-21 ASSESSMENT — PAIN DESCRIPTION - DESCRIPTORS: DESCRIPTORS: DULL

## 2018-06-21 ASSESSMENT — PAIN SCALES - GENERAL: PAINLEVEL_OUTOF10: 2

## 2018-06-21 ASSESSMENT — PAIN DESCRIPTION - LOCATION: LOCATION: SHOULDER

## 2018-06-21 ASSESSMENT — PAIN DESCRIPTION - PAIN TYPE: TYPE: CHRONIC PAIN

## 2018-06-21 ASSESSMENT — PAIN DESCRIPTION - ORIENTATION: ORIENTATION: RIGHT

## 2018-06-25 ENCOUNTER — HOSPITAL ENCOUNTER (OUTPATIENT)
Dept: PHYSICAL THERAPY | Age: 58
Setting detail: THERAPIES SERIES
Discharge: HOME | End: 2018-06-25
Payer: COMMERCIAL

## 2018-06-25 PROCEDURE — 97110 THERAPEUTIC EXERCISES: CPT

## 2018-06-25 ASSESSMENT — PAIN DESCRIPTION - PAIN TYPE: TYPE: CHRONIC PAIN

## 2018-06-25 ASSESSMENT — PAIN DESCRIPTION - ORIENTATION: ORIENTATION: RIGHT

## 2018-06-25 ASSESSMENT — PAIN DESCRIPTION - LOCATION: LOCATION: SHOULDER

## 2018-06-25 ASSESSMENT — PAIN SCALES - GENERAL: PAINLEVEL_OUTOF10: 2

## 2018-06-25 ASSESSMENT — PAIN DESCRIPTION - DESCRIPTORS: DESCRIPTORS: DULL

## 2018-06-27 ENCOUNTER — OFFICE VISIT (OUTPATIENT)
Dept: FAMILY MEDICINE CLINIC | Age: 58
End: 2018-06-27
Payer: COMMERCIAL

## 2018-06-27 VITALS
HEART RATE: 85 BPM | BODY MASS INDEX: 40.43 KG/M2 | HEIGHT: 74 IN | DIASTOLIC BLOOD PRESSURE: 84 MMHG | SYSTOLIC BLOOD PRESSURE: 132 MMHG | TEMPERATURE: 98.6 F | WEIGHT: 315 LBS | OXYGEN SATURATION: 98 % | RESPIRATION RATE: 14 BRPM

## 2018-06-27 DIAGNOSIS — R09.89 DECREASED PULSES IN FEET: ICD-10-CM

## 2018-06-27 DIAGNOSIS — I25.119 CORONARY ARTERY DISEASE INVOLVING NATIVE CORONARY ARTERY OF NATIVE HEART WITH ANGINA PECTORIS (HCC): ICD-10-CM

## 2018-06-27 DIAGNOSIS — R60.0 BILATERAL LEG EDEMA: Primary | ICD-10-CM

## 2018-06-27 PROCEDURE — 99213 OFFICE O/P EST LOW 20 MIN: CPT | Performed by: FAMILY MEDICINE

## 2018-06-27 PROCEDURE — 3017F COLORECTAL CA SCREEN DOC REV: CPT | Performed by: FAMILY MEDICINE

## 2018-06-27 PROCEDURE — G8598 ASA/ANTIPLAT THER USED: HCPCS | Performed by: FAMILY MEDICINE

## 2018-06-27 PROCEDURE — 1036F TOBACCO NON-USER: CPT | Performed by: FAMILY MEDICINE

## 2018-06-27 PROCEDURE — G8427 DOCREV CUR MEDS BY ELIG CLIN: HCPCS | Performed by: FAMILY MEDICINE

## 2018-06-27 PROCEDURE — G8417 CALC BMI ABV UP PARAM F/U: HCPCS | Performed by: FAMILY MEDICINE

## 2018-06-27 ASSESSMENT — ENCOUNTER SYMPTOMS
SHORTNESS OF BREATH: 0
ABDOMINAL PAIN: 0
CHEST TIGHTNESS: 0

## 2018-07-10 ENCOUNTER — OFFICE VISIT (OUTPATIENT)
Dept: CARDIOLOGY CLINIC | Age: 58
End: 2018-07-10
Payer: COMMERCIAL

## 2018-07-10 VITALS
SYSTOLIC BLOOD PRESSURE: 126 MMHG | WEIGHT: 315 LBS | DIASTOLIC BLOOD PRESSURE: 82 MMHG | BODY MASS INDEX: 40.43 KG/M2 | RESPIRATION RATE: 22 BRPM | OXYGEN SATURATION: 93 % | TEMPERATURE: 97.2 F | HEART RATE: 80 BPM | HEIGHT: 74 IN

## 2018-07-10 DIAGNOSIS — I10 ESSENTIAL HYPERTENSION: ICD-10-CM

## 2018-07-10 DIAGNOSIS — Z95.0 S/P CARDIAC PACEMAKER PROCEDURE: ICD-10-CM

## 2018-07-10 DIAGNOSIS — I25.2 HISTORY OF NON-ST ELEVATION MYOCARDIAL INFARCTION (NSTEMI): ICD-10-CM

## 2018-07-10 DIAGNOSIS — I48.0 PAROXYSMAL ATRIAL FIBRILLATION (HCC): Primary | ICD-10-CM

## 2018-07-10 PROCEDURE — G8598 ASA/ANTIPLAT THER USED: HCPCS | Performed by: INTERNAL MEDICINE

## 2018-07-10 PROCEDURE — 1036F TOBACCO NON-USER: CPT | Performed by: INTERNAL MEDICINE

## 2018-07-10 PROCEDURE — 3017F COLORECTAL CA SCREEN DOC REV: CPT | Performed by: INTERNAL MEDICINE

## 2018-07-10 PROCEDURE — 99213 OFFICE O/P EST LOW 20 MIN: CPT | Performed by: INTERNAL MEDICINE

## 2018-07-10 PROCEDURE — G8417 CALC BMI ABV UP PARAM F/U: HCPCS | Performed by: INTERNAL MEDICINE

## 2018-07-10 PROCEDURE — G8427 DOCREV CUR MEDS BY ELIG CLIN: HCPCS | Performed by: INTERNAL MEDICINE

## 2018-07-10 ASSESSMENT — ENCOUNTER SYMPTOMS
COLOR CHANGE: 0
SHORTNESS OF BREATH: 0
CHEST TIGHTNESS: 0
APNEA: 0
DIARRHEA: 0
VOMITING: 0
NAUSEA: 0
BLOOD IN STOOL: 0

## 2018-07-13 ENCOUNTER — HOSPITAL ENCOUNTER (OUTPATIENT)
Dept: PHYSICAL THERAPY | Age: 58
Setting detail: THERAPIES SERIES
Discharge: HOME | End: 2018-07-13
Payer: COMMERCIAL

## 2018-07-13 PROCEDURE — 97140 MANUAL THERAPY 1/> REGIONS: CPT

## 2018-07-13 PROCEDURE — 97110 THERAPEUTIC EXERCISES: CPT

## 2018-07-13 ASSESSMENT — PAIN DESCRIPTION - DESCRIPTORS: DESCRIPTORS: SORE

## 2018-07-13 ASSESSMENT — PAIN SCALES - GENERAL: PAINLEVEL_OUTOF10: 2

## 2018-07-13 ASSESSMENT — PAIN DESCRIPTION - ORIENTATION: ORIENTATION: RIGHT

## 2018-07-13 ASSESSMENT — PAIN DESCRIPTION - LOCATION: LOCATION: SHOULDER

## 2018-07-16 ENCOUNTER — HOSPITAL ENCOUNTER (OUTPATIENT)
Dept: PHYSICAL THERAPY | Age: 58
Setting detail: THERAPIES SERIES
Discharge: HOME | End: 2018-07-16
Payer: COMMERCIAL

## 2018-07-16 ENCOUNTER — HOSPITAL ENCOUNTER (OUTPATIENT)
Dept: ULTRASOUND IMAGING | Age: 58
Discharge: HOME | End: 2018-07-18
Payer: COMMERCIAL

## 2018-07-16 DIAGNOSIS — R09.89 DECREASED PULSES IN FEET: ICD-10-CM

## 2018-07-16 PROCEDURE — 93924 LWR XTR VASC STDY BILAT: CPT

## 2018-07-16 PROCEDURE — 97110 THERAPEUTIC EXERCISES: CPT

## 2018-07-16 PROCEDURE — 97140 MANUAL THERAPY 1/> REGIONS: CPT

## 2018-07-16 ASSESSMENT — PAIN SCALES - GENERAL: PAINLEVEL_OUTOF10: 2

## 2018-07-16 ASSESSMENT — PAIN DESCRIPTION - PAIN TYPE: TYPE: CHRONIC PAIN

## 2018-07-16 NOTE — PROGRESS NOTES
[address] [address]  Outpatient Physical Therapy    Treatment Note        Date: 2018  Patient: Jacquelyn Ovalles  : 1960  ACCT #: [de-identified]  Referring Practitioner: Case Ernandez MD  Diagnosis: Impingement syndrome of shoulder, right    Visit Information:  PT Visit Information  Onset Date: 18  [facility]  Total # of Visits Approved: 20  Total # of Visits to Date: 5  No Show: 1  Canceled Appointment: 0  Progress Note Counter:     Subjective: Pt reports \"not really any pain, just uncomfortable. \" Pt states he feels like he over did it on Thursday, then with therapy on Friday it made it more uncomfortable. Pt states MD appt went well. Still no lheavy lifting/lifting overhead. HEP Compliance:  [x] Good [] Fair [] Poor [] Reports not doing due to:    Vital Signs  Patient Currently in Pain: Yes   Pain Screening  Patient Currently in Pain: Yes  Pain Assessment  Pain Assessment: 0-10  Pain Level: 2  Pain Type: Chronic pain    OBJECTIVE:   Exercises  Exercise 2: pulleys x 4  Exercise 3: wand exercises flexion, abduction, ER 5s x 10  Exercise 4: corner stretch 20s x 3  Exercise 5:  ER stretch*10s x10  Exercise 6: wall slides Flex & Abd 5 sec x 5  Exercise 7: finger ladder x 5, flex, abd  Exercise 8:  rtb x20  Exercise 9: scapular retractions 5 sx 12    Strength: [x] NT  [] MMT completed:    ROM: [x] NT  [] ROM measurements:      Manual:   Manual therapy  PROM: right shoulder in all planes  Soft Tissue Mobalization: STM, TPR deltoid,bicep x 8min    Modalities:  Modalities  Cryotherapy (Minutes\Location): CP Rt bicep for dec'd pain and tightness x 10 min     *Indicates exercise, modality, or manual techniques to be initiated when appropriate    Assessment:    Body structures, Functions, Activity limitations: Decreased ROM, Decreased strength, Decreased endurance  Assessment: Pt w/ palpable knot in R bicep today that minimized post-manual. Pt required max VC's to

## 2018-07-18 ENCOUNTER — HOSPITAL ENCOUNTER (OUTPATIENT)
Dept: PHYSICAL THERAPY | Age: 58
Setting detail: THERAPIES SERIES
Discharge: HOME | End: 2018-07-18
Payer: COMMERCIAL

## 2018-07-18 PROCEDURE — 97140 MANUAL THERAPY 1/> REGIONS: CPT

## 2018-07-18 PROCEDURE — 97110 THERAPEUTIC EXERCISES: CPT

## 2018-07-18 ASSESSMENT — PAIN SCALES - GENERAL: PAINLEVEL_OUTOF10: 2

## 2018-07-18 ASSESSMENT — PAIN DESCRIPTION - LOCATION: LOCATION: SHOULDER

## 2018-07-18 ASSESSMENT — PAIN DESCRIPTION - DESCRIPTORS: DESCRIPTORS: SORE

## 2018-07-18 ASSESSMENT — PAIN DESCRIPTION - PAIN TYPE: TYPE: CHRONIC PAIN

## 2018-07-18 ASSESSMENT — PAIN DESCRIPTION - ORIENTATION: ORIENTATION: RIGHT

## 2018-07-18 NOTE — PROGRESS NOTES
[address] [address]  Outpatient Physical Therapy    Treatment Note        Date: 2018  Patient: Layne James  : 1960  ACCT #: [de-identified]  Referring Practitioner: Ryan Bearden MD  Diagnosis: Impingement syndrome of shoulder, right    Visit Information:  PT Visit Information  Onset Date: 18  PT Insurance Information: [employer]  Total # of Visits Approved: 20  Total # of Visits to Date: 6  No Show: 1  Canceled Appointment: 0  Progress Note Counter:     Subjective: Pt feels things are getting alittle better     HEP Compliance:  [x] Good [] Fair [] Poor [] Reports not doing due to:    Vital Signs  Patient Currently in Pain: Yes   Pain Screening  Patient Currently in Pain: Yes  Pain Assessment  Pain Assessment: 0-10  Pain Level: 2  Pain Type: Chronic pain  Pain Location: Shoulder  Pain Orientation: Right  Pain Descriptors: Sore    OBJECTIVE:   Exercises  Exercise 2: pulleys x 4  Exercise 3: wand exercises flexion, abduction, ER 5s x 12  Exercise 4: corner stretch 20s x 3  Exercise 5:  ER stretch*10s x10  Exercise 6: wall slides Flex & Abd 5 sec x 5  Exercise 7: finger ladder x 5, flex, abd  Exercise 8: Tb Rows/ Lats RTB x 20  Exercise 9: scapular retractions 5 sx 12  Exercise 10: Standing chest pulls YTB IR / ER  x 10  Exercise 11: Chest pulls Diagonals and nuetral YTB x 10 ea  Exercise 12: IR towel stretch *                       Manual:   Manual therapy  Joint mobilization: GHJ post and inf grade 3 and 4 to R  PROM: R flex, ER and IR w/ stretching into IR  Soft Tissue Mobalization: STM, TPR deltoid,bicep adn sub scap on R x 7 min    Modalities:        *Indicates exercise, modality, or manual techniques to be initiated when appropriate    Assessment: Body structures, Functions, Activity limitations: Decreased ROM, Decreased strength, Decreased endurance  Assessment: Pt tolerated progressions in exs well; Demos significant dec in R GHJ IR  ROM w/ PROM;  TrPs palapted and TPR

## 2018-07-19 DIAGNOSIS — I10 ESSENTIAL HYPERTENSION: ICD-10-CM

## 2018-07-20 RX ORDER — METOPROLOL SUCCINATE 100 MG/1
TABLET, EXTENDED RELEASE ORAL
Qty: 30 TABLET | Refills: 0 | Status: SHIPPED | OUTPATIENT
Start: 2018-07-20 | End: 2018-08-18 | Stop reason: SDUPTHER

## 2018-07-23 ENCOUNTER — HOSPITAL ENCOUNTER (OUTPATIENT)
Dept: PHYSICAL THERAPY | Age: 58
Setting detail: THERAPIES SERIES
Discharge: HOME | End: 2018-07-23
Payer: COMMERCIAL

## 2018-07-23 ENCOUNTER — OFFICE VISIT (OUTPATIENT)
Dept: FAMILY MEDICINE CLINIC | Age: 58
End: 2018-07-23
Payer: COMMERCIAL

## 2018-07-23 VITALS
OXYGEN SATURATION: 98 % | DIASTOLIC BLOOD PRESSURE: 78 MMHG | HEART RATE: 83 BPM | HEIGHT: 74 IN | BODY MASS INDEX: 40.43 KG/M2 | SYSTOLIC BLOOD PRESSURE: 132 MMHG | RESPIRATION RATE: 14 BRPM | WEIGHT: 315 LBS | TEMPERATURE: 98 F

## 2018-07-23 DIAGNOSIS — E11.8 TYPE 2 DIABETES MELLITUS WITH COMPLICATION, UNSPECIFIED LONG TERM INSULIN USE STATUS: Primary | ICD-10-CM

## 2018-07-23 LAB — GLUCOSE BLD-MCNC: 228 MG/DL

## 2018-07-23 PROCEDURE — 82962 GLUCOSE BLOOD TEST: CPT | Performed by: FAMILY MEDICINE

## 2018-07-23 PROCEDURE — 1036F TOBACCO NON-USER: CPT | Performed by: FAMILY MEDICINE

## 2018-07-23 PROCEDURE — 99212 OFFICE O/P EST SF 10 MIN: CPT | Performed by: FAMILY MEDICINE

## 2018-07-23 PROCEDURE — 97140 MANUAL THERAPY 1/> REGIONS: CPT

## 2018-07-23 PROCEDURE — 2022F DILAT RTA XM EVC RTNOPTHY: CPT | Performed by: FAMILY MEDICINE

## 2018-07-23 PROCEDURE — G8598 ASA/ANTIPLAT THER USED: HCPCS | Performed by: FAMILY MEDICINE

## 2018-07-23 PROCEDURE — 3017F COLORECTAL CA SCREEN DOC REV: CPT | Performed by: FAMILY MEDICINE

## 2018-07-23 PROCEDURE — 3046F HEMOGLOBIN A1C LEVEL >9.0%: CPT | Performed by: FAMILY MEDICINE

## 2018-07-23 PROCEDURE — G8417 CALC BMI ABV UP PARAM F/U: HCPCS | Performed by: FAMILY MEDICINE

## 2018-07-23 PROCEDURE — 97110 THERAPEUTIC EXERCISES: CPT

## 2018-07-23 PROCEDURE — G8427 DOCREV CUR MEDS BY ELIG CLIN: HCPCS | Performed by: FAMILY MEDICINE

## 2018-07-23 ASSESSMENT — PAIN DESCRIPTION - PAIN TYPE: TYPE: CHRONIC PAIN

## 2018-07-23 ASSESSMENT — PAIN DESCRIPTION - ORIENTATION: ORIENTATION: RIGHT

## 2018-07-23 ASSESSMENT — PAIN DESCRIPTION - LOCATION: LOCATION: SHOULDER

## 2018-07-23 ASSESSMENT — PAIN DESCRIPTION - DESCRIPTORS: DESCRIPTORS: SORE

## 2018-07-23 ASSESSMENT — PAIN SCALES - GENERAL: PAINLEVEL_OUTOF10: 2

## 2018-07-23 NOTE — PROGRESS NOTES
([facility]  POCT Glucose         Plan:      Orders Placed This Encounter   Medications    insulin glargine (BASAGLAR KWIKPEN) 100 UNIT/ML injection pen     Sig: Inject 40 Units into the skin nightly     Dispense:  4 pen     Refill:  0     Orders Placed This Encounter   Procedures    POCT Glucose   increase basaglar   Orders Placed This Encounter   Procedures    Ambulatory referral to Endocrinology     Referral Priority:   Routine     Referral Type:   Consult for Advice and Opinion     Referral Reason:   Specialty Services Required     Referred to Provider:   New Murphy MD     Requested Specialty:   Endocrinology     Number of Visits Requested:   1    POCT Glucose   f/u in December

## 2018-07-23 NOTE — PROGRESS NOTES
Needling  Plan Comment: transfer care to [facility]     Pt to continue current HEP. See objective section for any therapeutic exercise changes, additions or modifications this date.     PT Individual Minutes  Time In: 9364  Time Out: 5568  Minutes: 38  Timed Code Treatment Minutes: 38 Minutes  Procedure Minutes: n/a    Signature:  Electronically signed by [facility] on 7/23/18 at 8:39 AM

## 2018-07-24 RX ORDER — METFORMIN HYDROCHLORIDE 500 MG/1
TABLET, EXTENDED RELEASE ORAL
Qty: 120 TABLET | Refills: 0 | Status: SHIPPED | OUTPATIENT
Start: 2018-07-24 | End: 2018-08-20 | Stop reason: SDUPTHER

## 2018-07-25 ENCOUNTER — HOSPITAL ENCOUNTER (OUTPATIENT)
Dept: PHYSICAL THERAPY | Age: 58
Setting detail: THERAPIES SERIES
Discharge: HOME | End: 2018-07-25
Payer: COMMERCIAL

## 2018-07-25 PROCEDURE — 97110 THERAPEUTIC EXERCISES: CPT

## 2018-07-25 ASSESSMENT — PAIN DESCRIPTION - LOCATION: LOCATION: SHOULDER

## 2018-07-25 ASSESSMENT — PAIN SCALES - GENERAL: PAINLEVEL_OUTOF10: 2

## 2018-07-25 ASSESSMENT — PAIN DESCRIPTION - PAIN TYPE: TYPE: CHRONIC PAIN

## 2018-07-25 ASSESSMENT — PAIN DESCRIPTION - DESCRIPTORS: DESCRIPTORS: SORE

## 2018-07-25 ASSESSMENT — PAIN DESCRIPTION - ORIENTATION: ORIENTATION: RIGHT

## 2018-07-25 NOTE — PROGRESS NOTES
Shoulder Flexion: 4+/5  R Shoulder Extension: 5/5  R Shoulder ABduction: 4+/5  R Shoulder Internal Rotation: 5/5  R Shoulder External Rotation: 5/5  R Elbow Flexion: 5/5  R Elbow Extension: 5/5  Strength LUE  Comment: supraspinatus: 4+/5  L Shoulder Flexion: 4+/5  L Shoulder Extension: 4+/5  L Shoulder ABduction: 4+/5  L Shoulder Internal Rotation: 5/5  L Shoulder External Rotation: 4+/5  L Elbow Flexion: 5/5  L Elbow Extension: 5/5      [x] yes  [x] no   Long term goal 3: UEFI >/= 75/80 to demonstrate functional improvements. 73/80 [] yes  [x] no      [] yes  [] no      [] yes  [] no       [] yes  [] no       [] yes  [] no        Body structures, Functions, Activity limitations: Decreased ROM, Decreased strength, Decreased endurance  Assessment: Although pt did not meet ROM goals, he does  demos improved R shldr AROM ; Pt has improved B UE strength and reports improved function except w/ some difficulty w/ dressing such as w/ donning belt; Pt is I w/ HEP and agreeable to D/C at this time to cont on his own. New Education Provided:    G-Codes       PLAN: :  Plan  Plan Comment: D/C     Precautions:             ?     Patient Status:[] Continue/ Initiate plan of Care    [x] Discharge PT. Recommend pt continue with HEP. [] Additional visits requested, Please re-certify for additional visits:          Signature: Electronically signed by Rosa King PT on 7/25/18 at 6:32 PM      If you have any questions or concerns, please don't hesitate to call. Thank you for your referral.    I have reviewed this plan of care and certify a need for medically necessary rehabilitation services.     Physician Signature:__________________________________________________________  Date:  Please sign and return

## 2018-07-25 NOTE — PROGRESS NOTES
[address] [address]  Outpatient Physical Therapy    Treatment Note        Date: 2018  Patient: Jim Pompa  : 1960  ACCT #: [de-identified]  Referring Practitioner: Hadley Tobias MD  Diagnosis: Impingement syndrome of shoulder, right    Visit Information:  PT Visit Information  Onset Date: 18  PT Insurance Information: Bday  Total # of Visits Approved: 20  Total # of Visits to Date: 8  No Show: 1  Canceled Appointment: 0  Progress Note Counter:  PN this date     Subjective: Pt states the doctor wanted him to keep going until his next visit- PT told pt measures will be taken today and then PT will decide if Pt needs to cont and that pt only has 20 PT visits per year w/ his insurnace.      HEP Compliance:  [x] Good [] Fair [] Poor [] Reports not doing due to:    Vital Signs  Patient Currently in Pain: Yes   Pain Screening  Patient Currently in Pain: Yes  Pain Assessment  Pain Assessment: 0-10  Pain Level: 2 (Range 0/10 to 2/10 w/ mvmt)  Pain Type: Chronic pain  Pain Location: Shoulder  Pain Orientation: Right  Pain Descriptors: Sore    OBJECTIVE:   Exercises  Exercise 2: pulleys x 4  Exercise 4: corner stretch 20s x 3  Exercise 5:  ER stretch 10s x10  Exercise 6: wall slides Flex & Abd 5 sec x 8  Exercise 7: finger ladder x 5, flex, abd  Exercise 8: Tb Rows/ Lats GTB x15   Exercise 9: tband IR/ER RTB x15   Exercise 10: Standing chest pulls YTB IR / ER  x 10  Exercise 11: Chest pulls Diagonals and nuetral YTB x 10 ea  Exercise 12: IR towel stretch 10\"x5   Exercise 19: HEP IR towel stretch                                Strength: [] NT  [x] MMT completed:        Strength RUROSELINE  Comment: supraspinatus: 4+/5  R Shoulder Flexion: 4+/5  R Shoulder Extension: 5/5  R Shoulder ABduction: 4+/5  R Shoulder Internal Rotation: 5/5  R Shoulder External Rotation: 5/5  R Elbow Flexion: 5/5  R Elbow Extension: 5/5  Strength BOB  Comment: supraspinatus: 4+/5  L Shoulder Flexion: 4+/5  L Shoulder Extension: 4+/5  L Shoulder ABduction: 4+/5  L Shoulder Internal Rotation: 5/5  L Shoulder External Rotation: 4+/5  L Elbow Flexion: 5/5  L Elbow Extension: 5/5       ROM: [] NT  [x] ROM measurements:                 AROM RUE (degrees)  RUE General AROM: measures taken 7-23-18  R Shoulder Flexion 0-180: 130  R Shoulder Extension 0-45: 50 deg with increased pain at end range  R Shoulder ABduction 0-180: 118 with pain at end range   R Shoulder Int Rotation  0-70: 45 ( to L lat buttocks as of 7-25-18)  R Shoulder Ext Rotation 0-90: 20 sharp pain at end range              *Indicates exercise, modality, or manual techniques to be initiated when appropriate    Assessment: Body structures, Functions, Activity limitations: Decreased ROM, Decreased strength, Decreased endurance  Assessment: Although pt did not meet ROM goals, he does  demos improved R shldr AROM ; Pt has improved B UE strength and reports improved function except w/ some difficulty w/ dressing such as w/ donning belt; Pt is I w/ HEP and agreeable to D/C at this time to cont on his own. Treatment Diagnosis: right shoulder pain, decreased right shoulder ROM, decreased right shoulder strength          Goals:  Short term goals  Time Frame for Short term goals: 3 weeks  Short term goal 1: Patient will report 0/10 pain in right shoulder with functional activities. Short term goal 2: Patient will be independent with HEP. Long term goals  Time Frame for Long term goals : 4 weeks  Long term goal 1: Patient will increase right shoulder ROM flexion/abduction >/=160 degrees and internal/external rotation to >/= 70 degrees for improved functional tolerance. Long term goal 2: Patient will increase strength in bilateral UEs to 5/5 for improve lifting tolerance. Long term goal 3: UEFI >/= 75/80 to demonstrate functional improvements.   Progress toward goals:    POST-PAIN       Pain Rating (0-10 pain scale):  2 /10   Location and pain description same as

## 2018-08-06 RX ORDER — GLIPIZIDE 10 MG/1
TABLET, FILM COATED, EXTENDED RELEASE ORAL
Qty: 180 TABLET | Refills: 0 | Status: SHIPPED | OUTPATIENT
Start: 2018-08-06 | End: 2018-08-22 | Stop reason: ALTCHOICE

## 2018-08-06 RX ORDER — CANAGLIFLOZIN 100 MG/1
TABLET, FILM COATED ORAL
Qty: 90 TABLET | Refills: 0 | Status: SHIPPED | OUTPATIENT
Start: 2018-08-06 | End: 2018-08-22 | Stop reason: ALTCHOICE

## 2018-08-06 RX ORDER — LOSARTAN POTASSIUM AND HYDROCHLOROTHIAZIDE 25; 100 MG/1; MG/1
TABLET ORAL
Qty: 90 TABLET | Refills: 0 | Status: SHIPPED | OUTPATIENT
Start: 2018-08-06 | End: 2018-11-06 | Stop reason: SDUPTHER

## 2018-08-18 DIAGNOSIS — I10 ESSENTIAL HYPERTENSION: ICD-10-CM

## 2018-08-20 RX ORDER — METOPROLOL SUCCINATE 100 MG/1
TABLET, EXTENDED RELEASE ORAL
Qty: 30 TABLET | Refills: 0 | Status: SHIPPED | OUTPATIENT
Start: 2018-08-20 | End: 2018-09-14 | Stop reason: SDUPTHER

## 2018-08-21 RX ORDER — METFORMIN HYDROCHLORIDE 500 MG/1
TABLET, EXTENDED RELEASE ORAL
Qty: 120 TABLET | Refills: 0 | Status: SHIPPED | OUTPATIENT
Start: 2018-08-21 | End: 2018-09-23 | Stop reason: SDUPTHER

## 2018-08-22 ENCOUNTER — OFFICE VISIT (OUTPATIENT)
Dept: ENDOCRINOLOGY | Age: 58
End: 2018-08-22
Payer: COMMERCIAL

## 2018-08-22 ENCOUNTER — TELEPHONE (OUTPATIENT)
Dept: INTERNAL MEDICINE CLINIC | Age: 58
End: 2018-08-22

## 2018-08-22 VITALS
DIASTOLIC BLOOD PRESSURE: 79 MMHG | WEIGHT: 315 LBS | SYSTOLIC BLOOD PRESSURE: 132 MMHG | HEIGHT: 74 IN | BODY MASS INDEX: 40.43 KG/M2 | HEART RATE: 73 BPM

## 2018-08-22 DIAGNOSIS — E11.8 TYPE 2 DIABETES MELLITUS WITH COMPLICATION, UNSPECIFIED WHETHER LONG TERM INSULIN USE: Primary | ICD-10-CM

## 2018-08-22 DIAGNOSIS — E66.01 MORBID OBESITY (HCC): ICD-10-CM

## 2018-08-22 LAB
GLUCOSE BLD-MCNC: 255 MG/DL
HBA1C MFR BLD: 10.4 %

## 2018-08-22 PROCEDURE — 3046F HEMOGLOBIN A1C LEVEL >9.0%: CPT | Performed by: INTERNAL MEDICINE

## 2018-08-22 PROCEDURE — 2022F DILAT RTA XM EVC RTNOPTHY: CPT | Performed by: INTERNAL MEDICINE

## 2018-08-22 PROCEDURE — 1036F TOBACCO NON-USER: CPT | Performed by: INTERNAL MEDICINE

## 2018-08-22 PROCEDURE — 99203 OFFICE O/P NEW LOW 30 MIN: CPT | Performed by: INTERNAL MEDICINE

## 2018-08-22 PROCEDURE — 95250 CONT GLUC MNTR PHYS/QHP EQP: CPT | Performed by: INTERNAL MEDICINE

## 2018-08-22 PROCEDURE — G8417 CALC BMI ABV UP PARAM F/U: HCPCS | Performed by: INTERNAL MEDICINE

## 2018-08-22 PROCEDURE — 82962 GLUCOSE BLOOD TEST: CPT | Performed by: INTERNAL MEDICINE

## 2018-08-22 PROCEDURE — G8427 DOCREV CUR MEDS BY ELIG CLIN: HCPCS | Performed by: INTERNAL MEDICINE

## 2018-08-22 PROCEDURE — 3017F COLORECTAL CA SCREEN DOC REV: CPT | Performed by: INTERNAL MEDICINE

## 2018-08-22 PROCEDURE — 83036 HEMOGLOBIN GLYCOSYLATED A1C: CPT | Performed by: INTERNAL MEDICINE

## 2018-08-22 PROCEDURE — G8598 ASA/ANTIPLAT THER USED: HCPCS | Performed by: INTERNAL MEDICINE

## 2018-08-22 RX ORDER — FLASH GLUCOSE SENSOR
KIT MISCELLANEOUS
Qty: 3 EACH | Refills: 5 | Status: SHIPPED | OUTPATIENT
Start: 2018-08-22 | End: 2018-09-15

## 2018-08-22 RX ORDER — FLASH GLUCOSE SENSOR
KIT MISCELLANEOUS
Qty: 3 EACH | Refills: 5 | Status: SHIPPED | OUTPATIENT
Start: 2018-08-22 | End: 2018-08-22 | Stop reason: SDUPTHER

## 2018-08-22 RX ORDER — FLASH GLUCOSE SENSOR
KIT MISCELLANEOUS
Qty: 1 DEVICE | Refills: 0 | Status: SHIPPED | OUTPATIENT
Start: 2018-08-22 | End: 2018-08-22 | Stop reason: SDUPTHER

## 2018-08-22 RX ORDER — FLASH GLUCOSE SENSOR
KIT MISCELLANEOUS
Qty: 1 DEVICE | Refills: 0 | Status: SHIPPED | OUTPATIENT
Start: 2018-08-22 | End: 2018-09-15

## 2018-08-26 NOTE — PROGRESS NOTES
isosorbide mononitrate (IMDUR) 30 MG extended release tablet, TAKE ONE TABLET BY MOUTH EVERY DAY, Disp: 30 tablet, Rfl: 5    Insulin Pen Needle 30G X 8 MM MISC, 1 each by Does not apply route daily, Disp: 100 each, Rfl: 3    nitroGLYCERIN (NITROSTAT) 0.4 MG SL tablet, up to max of 3 total doses. If no relief after 1 dose, call 911., Disp: 25 tablet, Rfl: 3    fluticasone (FLONASE) 50 MCG/ACT nasal spray, 2 sprays by Nasal route daily, Disp: 1 Bottle, Rfl: 0    NOVOFINE 30G X 8 MM MISC, USE DAILY AS DIRECTED, Disp: 200 each, Rfl: 2    rivaroxaban (XARELTO) 20 MG TABS tablet, Take 20 mg by mouth daily (with breakfast) , Disp: , Rfl:     aspirin 81 MG tablet, Take 81 mg by mouth daily. , Disp: , Rfl:       Review of Systems   Constitutional: Positive for fatigue. Cardiovascular: Positive for palpitations. Neurological: Positive for dizziness. Psychiatric/Behavioral: Positive for sleep disturbance. All other systems reviewed and are negative. Vitals:    08/22/18 1415   BP: 132/79   Site: Right Arm   Position: Sitting   Cuff Size: Large Adult   Pulse: 73   Weight: (!) 338 lb (153.3 kg)   Height: 6' 2\" (1.88 m)       Objective:   Physical Exam   Constitutional: He is oriented to person, place, and time. He appears well-developed and well-nourished. HENT:   Head: Normocephalic and atraumatic. Right Ear: External ear normal.   Left Ear: External ear normal.   Eyes: Conjunctivae are normal. Right eye exhibits no discharge. Left eye exhibits no discharge. No scleral icterus. Neck: Neck supple. No thyromegaly present. Cardiovascular: Normal rate, normal heart sounds and intact distal pulses. Pulmonary/Chest: Effort normal and breath sounds normal.   Abdominal:   Obese    Musculoskeletal: Normal range of motion. He exhibits no edema. Feet:    Lymphadenopathy:     He has no cervical adenopathy. Neurological: He is alert and oriented to person, place, and time. Skin: Skin is warm and dry. Psychiatric: He has a normal mood and affect. Assessment:       Diagnosis Orders   1. Type 2 diabetes mellitus with complication, unspecified whether long term insulin use (HCC)  POCT Glucose    POCT glycosylated hemoglobin (Hb A1C)    NV CONT GLUC MNTR PHYSICIAN/QHP PROVIDED EQUIPTMENT   2. Obstructive sleep apnea syndrome             Plan:      Orders Placed This Encounter   Procedures    POCT Glucose    POCT glycosylated hemoglobin (Hb A1C)    NV CONT GLUC MNTR PHYSICIAN/QHP PROVIDED EQUIPTMENT       increase basaglar  dose   Add Humalog   continue metformin stop glucotrol and invokana   Orders Placed This Encounter   Medications    insulin glargine (BASAGLAR KWIKPEN) 100 UNIT/ML injection pen     Si units at bedtime     Dispense:  10 pen     Refill:  0    insulin lispro (HUMALOG KWIKPEN) 100 UNIT/ML pen     Si units at each meals     Dispense:  5 pen     Refill:  3    Insulin Pen Needle (NOVOFINE) 32G X 6 MM MISC     Sig: Qid     Dispense:  300 each     Refill:  3    DISCONTD: Continuous Blood Gluc  (FREESTYLE NIKKY READER) MARIYA     Sig: As directed     Dispense:  1 Device     Refill:  00    DISCONTD: Continuous Blood Gluc Sensor (FREESTYLE NIKKY SENSOR SYSTEM) MISC     Sig: Every 10 days     Dispense:  3 each     Refill:  5    Continuous Blood Gluc  (FREESTYLE NIKKY READER) MARIYA     Sig: Give 1 reader dx e11.65     Dispense:  1 Device     Refill:  00    Continuous Blood Gluc Sensor (FREESTYLE NIKKY SENSOR SYSTEM) MISC     Sig: Change Every 10 days dx e11.65     Dispense:  3 each     Refill:  5       Diabetes education provided today:  More tahn 50 % of 30 min spend in  Face to face pt education/counsleing   Continuous Glucose monitor. How it works and checks blood sugars every 5 min. for 4 days during our tests. Managing high and low sugar readings. Rotation of sites for subcutaneous medication injection.   goals for hbaic of 7 or lower     F/u in 4 weeks

## 2018-09-05 ENCOUNTER — NURSE ONLY (OUTPATIENT)
Dept: ENDOCRINOLOGY | Age: 58
End: 2018-09-05
Payer: COMMERCIAL

## 2018-09-05 DIAGNOSIS — E11.8 TYPE 2 DIABETES MELLITUS WITH COMPLICATION, UNSPECIFIED WHETHER LONG TERM INSULIN USE: Primary | ICD-10-CM

## 2018-09-05 PROCEDURE — 95251 CONT GLUC MNTR ANALYSIS I&R: CPT | Performed by: INTERNAL MEDICINE

## 2018-09-13 ENCOUNTER — HOSPITAL ENCOUNTER (OUTPATIENT)
Dept: CT IMAGING | Age: 58
Discharge: HOME | End: 2018-09-15
Payer: COMMERCIAL

## 2018-09-13 ENCOUNTER — HOSPITAL ENCOUNTER (OUTPATIENT)
Dept: GENERAL RADIOLOGY | Age: 58
Discharge: HOME | End: 2018-09-15
Payer: COMMERCIAL

## 2018-09-13 DIAGNOSIS — M75.40 IMPINGEMENT SYNDROME OF SHOULDER REGION, UNSPECIFIED LATERALITY: ICD-10-CM

## 2018-09-13 DIAGNOSIS — M75.51 BURSITIS OF RIGHT SHOULDER: ICD-10-CM

## 2018-09-13 DIAGNOSIS — M75.50 BURSITIS OF SHOULDER, UNSPECIFIED LATERALITY: ICD-10-CM

## 2018-09-13 PROCEDURE — 20610 DRAIN/INJ JOINT/BURSA W/O US: CPT

## 2018-09-13 PROCEDURE — 2500000003 HC RX 250 WO HCPCS: Performed by: ORTHOPAEDIC SURGERY

## 2018-09-13 PROCEDURE — 6360000004 HC RX CONTRAST MEDICATION: Performed by: ORTHOPAEDIC SURGERY

## 2018-09-13 PROCEDURE — 73201 CT UPPER EXTREMITY W/DYE: CPT

## 2018-09-13 PROCEDURE — 2580000003 HC RX 258: Performed by: ORTHOPAEDIC SURGERY

## 2018-09-13 RX ORDER — LIDOCAINE HYDROCHLORIDE 20 MG/ML
20 INJECTION, SOLUTION EPIDURAL; INFILTRATION; INTRACAUDAL; PERINEURAL ONCE
Status: COMPLETED | OUTPATIENT
Start: 2018-09-13 | End: 2018-09-13

## 2018-09-13 RX ORDER — 0.9 % SODIUM CHLORIDE 0.9 %
10 VIAL (ML) INJECTION 2 TIMES DAILY
Status: DISCONTINUED | OUTPATIENT
Start: 2018-09-13 | End: 2018-09-16 | Stop reason: HOSPADM

## 2018-09-13 RX ADMIN — LIDOCAINE HYDROCHLORIDE 2 ML: 20 INJECTION, SOLUTION EPIDURAL; INFILTRATION; INTRACAUDAL; PERINEURAL at 14:27

## 2018-09-13 RX ADMIN — IOVERSOL 10 ML: 678 INJECTION INTRA-ARTERIAL; INTRAVENOUS at 14:21

## 2018-09-13 RX ADMIN — SODIUM CHLORIDE 6 ML: 9 INJECTION INTRAMUSCULAR; INTRAVENOUS; SUBCUTANEOUS at 14:25

## 2018-09-14 DIAGNOSIS — I10 ESSENTIAL HYPERTENSION: ICD-10-CM

## 2018-09-15 RX ORDER — METOPROLOL SUCCINATE 100 MG/1
TABLET, EXTENDED RELEASE ORAL
Qty: 30 TABLET | Refills: 2 | Status: SHIPPED | OUTPATIENT
Start: 2018-09-15 | End: 2018-10-06 | Stop reason: SDUPTHER

## 2018-09-20 DIAGNOSIS — I10 ESSENTIAL HYPERTENSION: ICD-10-CM

## 2018-09-20 RX ORDER — METFORMIN HYDROCHLORIDE 500 MG/1
TABLET, EXTENDED RELEASE ORAL
Qty: 120 TABLET | Refills: 0 | OUTPATIENT
Start: 2018-09-20

## 2018-09-20 RX ORDER — METOPROLOL SUCCINATE 100 MG/1
TABLET, EXTENDED RELEASE ORAL
Qty: 30 TABLET | Refills: 5 | Status: SHIPPED | OUTPATIENT
Start: 2018-09-20 | End: 2019-03-19 | Stop reason: SDUPTHER

## 2018-09-25 RX ORDER — METFORMIN HYDROCHLORIDE 500 MG/1
TABLET, EXTENDED RELEASE ORAL
Qty: 120 TABLET | Refills: 0 | Status: SHIPPED | OUTPATIENT
Start: 2018-09-25 | End: 2018-10-27 | Stop reason: SDUPTHER

## 2018-10-02 PROBLEM — I25.2 HISTORY OF NON-ST ELEVATION MYOCARDIAL INFARCTION (NSTEMI): Status: ACTIVE | Noted: 2017-12-30

## 2018-10-06 ENCOUNTER — OFFICE VISIT (OUTPATIENT)
Dept: FAMILY MEDICINE CLINIC | Age: 58
End: 2018-10-06
Payer: COMMERCIAL

## 2018-10-06 VITALS
HEART RATE: 78 BPM | SYSTOLIC BLOOD PRESSURE: 136 MMHG | WEIGHT: 315 LBS | BODY MASS INDEX: 40.43 KG/M2 | RESPIRATION RATE: 16 BRPM | DIASTOLIC BLOOD PRESSURE: 84 MMHG | TEMPERATURE: 98.3 F | HEIGHT: 74 IN

## 2018-10-06 DIAGNOSIS — I10 ESSENTIAL HYPERTENSION: ICD-10-CM

## 2018-10-06 DIAGNOSIS — I25.119 CORONARY ARTERY DISEASE INVOLVING NATIVE CORONARY ARTERY OF NATIVE HEART WITH ANGINA PECTORIS (HCC): ICD-10-CM

## 2018-10-06 DIAGNOSIS — E78.2 MIXED HYPERLIPIDEMIA: ICD-10-CM

## 2018-10-06 DIAGNOSIS — Z01.818 PRE-OP EXAMINATION: Primary | ICD-10-CM

## 2018-10-06 DIAGNOSIS — E66.01 MORBID OBESITY WITH BMI OF 40.0-44.9, ADULT (HCC): ICD-10-CM

## 2018-10-06 DIAGNOSIS — G89.29 CHRONIC RIGHT SHOULDER PAIN: ICD-10-CM

## 2018-10-06 DIAGNOSIS — M25.511 CHRONIC RIGHT SHOULDER PAIN: ICD-10-CM

## 2018-10-06 PROCEDURE — 99242 OFF/OP CONSLTJ NEW/EST SF 20: CPT | Performed by: FAMILY MEDICINE

## 2018-10-06 PROCEDURE — 3017F COLORECTAL CA SCREEN DOC REV: CPT | Performed by: FAMILY MEDICINE

## 2018-10-06 PROCEDURE — G8417 CALC BMI ABV UP PARAM F/U: HCPCS | Performed by: FAMILY MEDICINE

## 2018-10-06 PROCEDURE — G8427 DOCREV CUR MEDS BY ELIG CLIN: HCPCS | Performed by: FAMILY MEDICINE

## 2018-10-06 PROCEDURE — G8484 FLU IMMUNIZE NO ADMIN: HCPCS | Performed by: FAMILY MEDICINE

## 2018-10-06 ASSESSMENT — ENCOUNTER SYMPTOMS
SHORTNESS OF BREATH: 0
ABDOMINAL PAIN: 0

## 2018-10-06 ASSESSMENT — PATIENT HEALTH QUESTIONNAIRE - PHQ9
SUM OF ALL RESPONSES TO PHQ9 QUESTIONS 1 & 2: 2
2. FEELING DOWN, DEPRESSED OR HOPELESS: 1
SUM OF ALL RESPONSES TO PHQ QUESTIONS 1-9: 2
1. LITTLE INTEREST OR PLEASURE IN DOING THINGS: 1
SUM OF ALL RESPONSES TO PHQ QUESTIONS 1-9: 2

## 2018-10-10 ENCOUNTER — OFFICE VISIT (OUTPATIENT)
Dept: CARDIOLOGY CLINIC | Age: 58
End: 2018-10-10
Payer: COMMERCIAL

## 2018-10-10 VITALS
BODY MASS INDEX: 40.43 KG/M2 | RESPIRATION RATE: 22 BRPM | HEIGHT: 74 IN | WEIGHT: 315 LBS | OXYGEN SATURATION: 93 % | TEMPERATURE: 97.6 F | DIASTOLIC BLOOD PRESSURE: 84 MMHG | SYSTOLIC BLOOD PRESSURE: 128 MMHG | HEART RATE: 73 BPM

## 2018-10-10 DIAGNOSIS — I48.0 PAROXYSMAL ATRIAL FIBRILLATION (HCC): Primary | ICD-10-CM

## 2018-10-10 DIAGNOSIS — I47.1 SVT (SUPRAVENTRICULAR TACHYCARDIA) (HCC): ICD-10-CM

## 2018-10-10 DIAGNOSIS — I25.2 HISTORY OF NON-ST ELEVATION MYOCARDIAL INFARCTION (NSTEMI): ICD-10-CM

## 2018-10-10 DIAGNOSIS — Z95.0 S/P CARDIAC PACEMAKER PROCEDURE: ICD-10-CM

## 2018-10-10 DIAGNOSIS — I10 ESSENTIAL HYPERTENSION: ICD-10-CM

## 2018-10-10 DIAGNOSIS — Z01.810 PREOP CARDIOVASCULAR EXAM: ICD-10-CM

## 2018-10-10 PROCEDURE — G8427 DOCREV CUR MEDS BY ELIG CLIN: HCPCS | Performed by: INTERNAL MEDICINE

## 2018-10-10 PROCEDURE — 1036F TOBACCO NON-USER: CPT | Performed by: INTERNAL MEDICINE

## 2018-10-10 PROCEDURE — G8598 ASA/ANTIPLAT THER USED: HCPCS | Performed by: INTERNAL MEDICINE

## 2018-10-10 PROCEDURE — 3017F COLORECTAL CA SCREEN DOC REV: CPT | Performed by: INTERNAL MEDICINE

## 2018-10-10 PROCEDURE — G8484 FLU IMMUNIZE NO ADMIN: HCPCS | Performed by: INTERNAL MEDICINE

## 2018-10-10 PROCEDURE — 99214 OFFICE O/P EST MOD 30 MIN: CPT | Performed by: INTERNAL MEDICINE

## 2018-10-10 PROCEDURE — G8417 CALC BMI ABV UP PARAM F/U: HCPCS | Performed by: INTERNAL MEDICINE

## 2018-10-10 ASSESSMENT — ENCOUNTER SYMPTOMS
BLOOD IN STOOL: 0
VOMITING: 0
SHORTNESS OF BREATH: 0
APNEA: 0
CHEST TIGHTNESS: 0
NAUSEA: 0
DIARRHEA: 0

## 2018-10-10 NOTE — PROGRESS NOTES
3    Insulin Pen Needle (NOVOFINE) 32G X 6 MM MISC Qid 300 each 3    losartan-hydrochlorothiazide (HYZAAR) 100-25 MG per tablet TAKE 1 TABLET BY MOUTH  DAILY 90 tablet 0    spironolactone (ALDACTONE) 50 MG tablet TAKE ONE TABLET BY MOUTH EVERY DAY 90 tablet 3    glucose blood VI test strips (ONE TOUCH ULTRA TEST) strip Checks 3 times daily. IDDM--ICD-10 E11.9 300 strip 1    sotalol (BETAPACE) 120 MG tablet Take 1 tablet by mouth 2 times daily 60 tablet 11    atorvastatin (LIPITOR) 80 MG tablet Take 1 tablet by mouth nightly 30 tablet 5    isosorbide mononitrate (IMDUR) 30 MG extended release tablet TAKE ONE TABLET BY MOUTH EVERY DAY 30 tablet 5    nitroGLYCERIN (NITROSTAT) 0.4 MG SL tablet up to max of 3 total doses. If no relief after 1 dose, call 911. 25 tablet 3    rivaroxaban (XARELTO) 20 MG TABS tablet Take 20 mg by mouth daily (with breakfast)       aspirin 81 MG tablet Take 81 mg by mouth daily. No current facility-administered medications for this visit. Review of Systems:   Review of Systems   Constitutional: Negative for activity change, appetite change, diaphoresis, fatigue and unexpected weight change. HENT: Negative for facial swelling, nosebleeds, trouble swallowing and voice change. Respiratory: Negative for apnea, chest tightness, shortness of breath and wheezing. Cardiovascular: Negative for chest pain, palpitations and leg swelling. Gastrointestinal: Negative for abdominal distention, anal bleeding, blood in stool, diarrhea, nausea and vomiting. Genitourinary: Negative for decreased urine volume and dysuria. Musculoskeletal: Negative for gait problem, myalgias, neck pain and neck stiffness. Skin: Negative for color change, pallor, rash and wound. Neurological: Negative for dizziness, seizures, syncope, facial asymmetry, weakness, light-headedness, numbness and headaches. Hematological: Does not bruise/bleed easily.    Psychiatric/Behavioral: Negative for 05/23/2018    HDL 50 06/19/2017    HDL 50 06/13/2016     Lab Results   Component Value Date    LDLCALC 58 05/23/2018    LDLCALC 107 06/19/2017    LDLCALC 98 06/13/2016     No results found for: LABVLDL, VLDL  Lab Results   Component Value Date    CHOLHDLRATIO 3.4 04/30/2012    CHOLHDLRATIO 4.4 02/28/2011     CMP:    Lab Results   Component Value Date     05/23/2018    K 4.4 05/23/2018    CL 99 05/23/2018    CO2 24 05/23/2018    BUN 19 05/23/2018    CREATININE 0.65 05/23/2018    GFRAA >60.0 05/23/2018    LABGLOM >60.0 05/23/2018    GLUCOSE 255 08/22/2018    GLUCOSE 146 04/30/2012    PROT 6.6 05/23/2018    LABALBU 4.5 05/23/2018    LABALBU 4.5 04/30/2012    CALCIUM 9.4 05/23/2018    BILITOT 0.5 05/23/2018    ALKPHOS 84 05/23/2018    AST 11 05/23/2018    ALT 18 05/23/2018     BMP:    Lab Results   Component Value Date     05/23/2018    K 4.4 05/23/2018    CL 99 05/23/2018    CO2 24 05/23/2018    BUN 19 05/23/2018    LABALBU 4.5 05/23/2018    LABALBU 4.5 04/30/2012    CREATININE 0.65 05/23/2018    CALCIUM 9.4 05/23/2018    GFRAA >60.0 05/23/2018    LABGLOM >60.0 05/23/2018    GLUCOSE 255 08/22/2018    GLUCOSE 146 04/30/2012     Magnesium:    Lab Results   Component Value Date    MG 1.8 03/22/2018     TSH:  Lab Results   Component Value Date    TSH 2.410 03/21/2018       Patient Active Problem List   Diagnosis    Hypertension    DM [facility]diabetes mellitus) ([facility]    BMI 40.0-44.9, adult ([facility]    History of tobacco abuse    S/P cardiac pacemaker procedure    Abnormal nuclear stress test    Phlebitis    CAD (coronary artery disease)    Dizziness    Heart palpitations    History of non-ST elevation myocardial infarction (NSTEMI)    A-fib (HCC)    SVT (supraventricular tachycardia) ([facility])       There are no discontinued medications. Modified Medications    No medications on file       No orders of the defined types were placed in this encounter. Assessment:    1.  Paroxysmal atrial fibrillation

## 2018-10-13 ENCOUNTER — OFFICE VISIT (OUTPATIENT)
Dept: PRIMARY CARE CLINIC | Age: 58
End: 2018-10-13
Payer: COMMERCIAL

## 2018-10-13 VITALS
SYSTOLIC BLOOD PRESSURE: 134 MMHG | BODY MASS INDEX: 40.43 KG/M2 | HEIGHT: 74 IN | WEIGHT: 315 LBS | HEART RATE: 70 BPM | DIASTOLIC BLOOD PRESSURE: 76 MMHG

## 2018-10-13 DIAGNOSIS — E11.8 TYPE 2 DIABETES MELLITUS WITH COMPLICATION, UNSPECIFIED WHETHER LONG TERM INSULIN USE: Primary | ICD-10-CM

## 2018-10-13 DIAGNOSIS — E66.01 MORBID OBESITY (HCC): ICD-10-CM

## 2018-10-13 LAB — GLUCOSE BLD-MCNC: 369 MG/DL

## 2018-10-13 PROCEDURE — 3017F COLORECTAL CA SCREEN DOC REV: CPT | Performed by: INTERNAL MEDICINE

## 2018-10-13 PROCEDURE — 3046F HEMOGLOBIN A1C LEVEL >9.0%: CPT | Performed by: INTERNAL MEDICINE

## 2018-10-13 PROCEDURE — G8484 FLU IMMUNIZE NO ADMIN: HCPCS | Performed by: INTERNAL MEDICINE

## 2018-10-13 PROCEDURE — 1036F TOBACCO NON-USER: CPT | Performed by: INTERNAL MEDICINE

## 2018-10-13 PROCEDURE — G8598 ASA/ANTIPLAT THER USED: HCPCS | Performed by: INTERNAL MEDICINE

## 2018-10-13 PROCEDURE — 99213 OFFICE O/P EST LOW 20 MIN: CPT | Performed by: INTERNAL MEDICINE

## 2018-10-13 PROCEDURE — G8417 CALC BMI ABV UP PARAM F/U: HCPCS | Performed by: INTERNAL MEDICINE

## 2018-10-13 PROCEDURE — 82962 GLUCOSE BLOOD TEST: CPT | Performed by: INTERNAL MEDICINE

## 2018-10-13 PROCEDURE — G8428 CUR MEDS NOT DOCUMENT: HCPCS | Performed by: INTERNAL MEDICINE

## 2018-10-13 PROCEDURE — 2022F DILAT RTA XM EVC RTNOPTHY: CPT | Performed by: INTERNAL MEDICINE

## 2018-10-13 NOTE — PROGRESS NOTES
Subjective:      Patient ID: Malik Saravia is a 56 y.o. male. 6 week f/u on diabetes   Diabetes   He presents for his follow-up diabetic visit. He has type 2 diabetes mellitus. Symptoms are worsening. Diabetic complications include heart disease. Risk factors for coronary artery disease include diabetes mellitus and hypertension. Current diabetic treatment includes insulin injections (basaglar humalog plus metformin ). He is currently taking insulin pre-breakfast, pre-lunch, pre-dinner and at bedtime. His overall blood glucose range is >200 mg/dl.  (Reviewed 2 week cgms average glucose 350)     Pt scheduled for right  shoulder  surgery     Patient Active Problem List   Diagnosis    Hypertension    DM (diabetes mellitus) ([facility]    BMI 40.0-44.9, adult ([facility]    History of tobacco abuse    S/P cardiac pacemaker procedure    Abnormal nuclear stress test    Phlebitis    CAD (coronary artery disease)    Dizziness    Heart palpitations    History of non-ST elevation myocardial infarction (NSTEMI)    A-fib (HCC)    SVT [facility]supraventricular tachycardia) ([facility])     Allergies   Allergen Reactions    Actos [Pioglitazone]     Byetta 10 Mcg Pen [Exenatide]      Nausea        Current Outpatient Prescriptions:     insulin glargine (BASAGLAR KWIKPEN) 100 UNIT/ML injection pen, 80 units at bedtime, Disp: 10 pen, Rfl: 0    insulin lispro (HUMALOG KWIKPEN) 100 UNIT/ML pen, 16 units at each meals, Disp: 5 pen, Rfl: 3    metFORMIN (GLUCOPHAGE-XR) 500 MG extended release tablet, TAKE FOUR (4) TABLETS BY MOUTH EVERY DAY, Disp: 120 tablet, Rfl: 0    metoprolol succinate (TOPROL XL) 100 MG extended release tablet, TAKE ONE TABLET BY MOUTH EVERY DAY, Disp: 30 tablet, Rfl: 5    Insulin Pen Needle (NOVOFINE) 32G X 6 MM MISC, Qid, Disp: 300 each, Rfl: 3    losartan-hydrochlorothiazide (HYZAAR) 100-25 MG per tablet, TAKE 1 TABLET BY MOUTH  DAILY, Disp: 90 tablet, Rfl: 0    spironolactone (ALDACTONE) 50 MG tablet, TAKE ONE TABLET BY MOUTH EVERY DAY, Disp: 90 tablet, Rfl: 3    glucose blood VI test strips (ONE TOUCH ULTRA TEST) strip, Checks 3 times daily. IDDM--ICD-10 E11.9, Disp: 300 strip, Rfl: 1    sotalol (BETAPACE) 120 MG tablet, Take 1 tablet by mouth 2 times daily, Disp: 60 tablet, Rfl: 11    atorvastatin (LIPITOR) 80 MG tablet, Take 1 tablet by mouth nightly, Disp: 30 tablet, Rfl: 5    isosorbide mononitrate (IMDUR) 30 MG extended release tablet, TAKE ONE TABLET BY MOUTH EVERY DAY, Disp: 30 tablet, Rfl: 5    nitroGLYCERIN (NITROSTAT) 0.4 MG SL tablet, up to max of 3 total doses. If no relief after 1 dose, call 911., Disp: 25 tablet, Rfl: 3    rivaroxaban (XARELTO) 20 MG TABS tablet, Take 20 mg by mouth daily (with breakfast) , Disp: , Rfl:     aspirin 81 MG tablet, Take 81 mg by mouth daily. , Disp: , Rfl:     Review of Systems   Musculoskeletal: Positive for arthralgias. Vitals:    10/13/18 1224   BP: 134/76   Site: Right Upper Arm   Position: Sitting   Cuff Size: Large Adult   Pulse: 70   Weight: (!) 334 lb (151.5 kg)   Height: 6' 2\" (1.88 m)       Objective:   Physical Exam   Constitutional: He appears well-developed and well-nourished. HENT:   Head: Normocephalic and atraumatic. Cardiovascular: Normal rate. Pulmonary/Chest: Effort normal.   Musculoskeletal: Normal range of motion. Neurological: He is alert. Psychiatric: He has a normal mood and affect. Assessment:       Diagnosis Orders   1. Type 2 diabetes mellitus with complication, unspecified whether long term insulin use (HCC)  POCT Glucose   2.  Morbid obesity ([facility]             Plan:      Orders Placed This Encounter   Procedures    POCT Glucose     Increase insulin dose   Orders Placed This Encounter   Medications    insulin glargine (BASAGLAR KWIKPEN) 100 UNIT/ML injection pen     Si units at bedtime     Dispense:  10 pen     Refill:  0    insulin lispro (HUMALOG KWIKPEN) 100 UNIT/ML pen     Si units at each meals     Dispense:  5

## 2018-10-15 ENCOUNTER — HOSPITAL ENCOUNTER (OUTPATIENT)
Dept: PREADMISSION TESTING | Age: 58
Discharge: HOME | End: 2018-10-19
Payer: COMMERCIAL

## 2018-10-15 VITALS
SYSTOLIC BLOOD PRESSURE: 133 MMHG | HEART RATE: 75 BPM | WEIGHT: 315 LBS | TEMPERATURE: 97.7 F | HEIGHT: 74 IN | RESPIRATION RATE: 16 BRPM | OXYGEN SATURATION: 96 % | BODY MASS INDEX: 40.43 KG/M2 | DIASTOLIC BLOOD PRESSURE: 77 MMHG

## 2018-10-15 LAB
ANION GAP SERPL CALCULATED.3IONS-SCNC: 17 MEQ/L (ref 7–13)
BUN BLDV-MCNC: 18 MG/DL (ref 6–20)
CALCIUM SERPL-MCNC: 9.5 MG/DL (ref 8.6–10.2)
CHLORIDE BLD-SCNC: 98 MEQ/L (ref 98–107)
CO2: 24 MEQ/L (ref 22–29)
CREAT SERPL-MCNC: 0.73 MG/DL (ref 0.7–1.2)
EKG ATRIAL RATE: 78 BPM
EKG P AXIS: -11 DEGREES
EKG P-R INTERVAL: 162 MS
EKG Q-T INTERVAL: 392 MS
EKG QRS DURATION: 84 MS
EKG QTC CALCULATION (BAZETT): 446 MS
EKG R AXIS: -10 DEGREES
EKG T AXIS: -14 DEGREES
EKG VENTRICULAR RATE: 78 BPM
GFR AFRICAN AMERICAN: >60
GFR NON-AFRICAN AMERICAN: >60
GLUCOSE BLD-MCNC: 326 MG/DL (ref 74–109)
HCT VFR BLD CALC: 44.6 % (ref 42–52)
HEMOGLOBIN: 14.8 G/DL (ref 14–18)
MCH RBC QN AUTO: 26 PG (ref 27–31.3)
MCHC RBC AUTO-ENTMCNC: 33.2 % (ref 33–37)
MCV RBC AUTO: 78.2 FL (ref 80–100)
PDW BLD-RTO: 14.8 % (ref 11.5–14.5)
PLATELET # BLD: 187 K/UL (ref 130–400)
POTASSIUM SERPL-SCNC: 4.2 MEQ/L (ref 3.5–5.1)
RBC # BLD: 5.71 M/UL (ref 4.7–6.1)
SODIUM BLD-SCNC: 139 MEQ/L (ref 132–144)
WBC # BLD: 8 K/UL (ref 4.8–10.8)

## 2018-10-15 PROCEDURE — 93005 ELECTROCARDIOGRAM TRACING: CPT

## 2018-10-15 PROCEDURE — 80048 BASIC METABOLIC PNL TOTAL CA: CPT

## 2018-10-15 PROCEDURE — 85027 COMPLETE CBC AUTOMATED: CPT

## 2018-10-15 RX ORDER — SODIUM CHLORIDE, SODIUM LACTATE, POTASSIUM CHLORIDE, CALCIUM CHLORIDE 600; 310; 30; 20 MG/100ML; MG/100ML; MG/100ML; MG/100ML
INJECTION, SOLUTION INTRAVENOUS CONTINUOUS
Status: CANCELLED | OUTPATIENT
Start: 2018-10-15

## 2018-10-15 RX ORDER — SODIUM CHLORIDE 0.9 % (FLUSH) 0.9 %
10 SYRINGE (ML) INJECTION PRN
Status: CANCELLED | OUTPATIENT
Start: 2018-10-15

## 2018-10-15 RX ORDER — SODIUM CHLORIDE 0.9 % (FLUSH) 0.9 %
10 SYRINGE (ML) INJECTION EVERY 12 HOURS SCHEDULED
Status: CANCELLED | OUTPATIENT
Start: 2018-10-15

## 2018-10-15 RX ORDER — LIDOCAINE HYDROCHLORIDE 10 MG/ML
1 INJECTION, SOLUTION EPIDURAL; INFILTRATION; INTRACAUDAL; PERINEURAL
Status: CANCELLED | OUTPATIENT
Start: 2018-10-15 | End: 2018-10-15

## 2018-10-16 PROCEDURE — 93010 ELECTROCARDIOGRAM REPORT: CPT | Performed by: INTERNAL MEDICINE

## 2018-10-22 RX ORDER — METFORMIN HYDROCHLORIDE 500 MG/1
TABLET, EXTENDED RELEASE ORAL
Qty: 120 TABLET | Refills: 0 | OUTPATIENT
Start: 2018-10-22

## 2018-10-22 ASSESSMENT — ENCOUNTER SYMPTOMS
VOICE CHANGE: 0
FACIAL SWELLING: 0
ANAL BLEEDING: 0
WHEEZING: 0
ABDOMINAL DISTENTION: 0
COLOR CHANGE: 0
TROUBLE SWALLOWING: 0

## 2018-10-25 RX ORDER — ISOSORBIDE MONONITRATE 30 MG/1
TABLET, EXTENDED RELEASE ORAL
Qty: 90 TABLET | Refills: 3 | Status: SHIPPED | OUTPATIENT
Start: 2018-10-25 | End: 2019-10-16 | Stop reason: SDUPTHER

## 2018-10-25 RX ORDER — METFORMIN HYDROCHLORIDE 500 MG/1
TABLET, EXTENDED RELEASE ORAL
Qty: 120 TABLET | Refills: 0 | OUTPATIENT
Start: 2018-10-25

## 2018-10-26 ENCOUNTER — HOSPITAL ENCOUNTER (OUTPATIENT)
Age: 58
Setting detail: OUTPATIENT SURGERY
Discharge: HOME | End: 2018-10-26
Attending: ORTHOPAEDIC SURGERY | Admitting: ORTHOPAEDIC SURGERY
Payer: COMMERCIAL

## 2018-10-26 ENCOUNTER — ANESTHESIA (OUTPATIENT)
Dept: OPERATING ROOM | Age: 58
End: 2018-10-26
Payer: COMMERCIAL

## 2018-10-26 ENCOUNTER — ANESTHESIA EVENT (OUTPATIENT)
Dept: OPERATING ROOM | Age: 58
End: 2018-10-26
Payer: COMMERCIAL

## 2018-10-26 VITALS — DIASTOLIC BLOOD PRESSURE: 84 MMHG | SYSTOLIC BLOOD PRESSURE: 142 MMHG | OXYGEN SATURATION: 99 % | TEMPERATURE: 96.6 F

## 2018-10-26 VITALS
WEIGHT: 315 LBS | RESPIRATION RATE: 20 BRPM | DIASTOLIC BLOOD PRESSURE: 93 MMHG | HEIGHT: 74 IN | BODY MASS INDEX: 40.43 KG/M2 | OXYGEN SATURATION: 95 % | HEART RATE: 91 BPM | TEMPERATURE: 98.7 F | SYSTOLIC BLOOD PRESSURE: 166 MMHG

## 2018-10-26 LAB
GLUCOSE BLD-MCNC: 239 MG/DL (ref 60–115)
GLUCOSE BLD-MCNC: 296 MG/DL (ref 60–115)
PERFORMED ON: ABNORMAL
PERFORMED ON: ABNORMAL

## 2018-10-26 PROCEDURE — 2580000003 HC RX 258: Performed by: NURSE PRACTITIONER

## 2018-10-26 PROCEDURE — 6360000002 HC RX W HCPCS: Performed by: NURSE PRACTITIONER

## 2018-10-26 PROCEDURE — 6360000002 HC RX W HCPCS: Performed by: STUDENT IN AN ORGANIZED HEALTH CARE EDUCATION/TRAINING PROGRAM

## 2018-10-26 PROCEDURE — 2500000003 HC RX 250 WO HCPCS: Performed by: NURSE ANESTHETIST, CERTIFIED REGISTERED

## 2018-10-26 PROCEDURE — 7100000001 HC PACU RECOVERY - ADDTL 15 MIN: Performed by: ORTHOPAEDIC SURGERY

## 2018-10-26 PROCEDURE — 7100000000 HC PACU RECOVERY - FIRST 15 MIN: Performed by: ORTHOPAEDIC SURGERY

## 2018-10-26 PROCEDURE — 3700000001 HC ADD 15 MINUTES (ANESTHESIA): Performed by: ORTHOPAEDIC SURGERY

## 2018-10-26 PROCEDURE — 6360000002 HC RX W HCPCS: Performed by: NURSE ANESTHETIST, CERTIFIED REGISTERED

## 2018-10-26 PROCEDURE — 64415 NJX AA&/STRD BRCH PLXS IMG: CPT | Performed by: STUDENT IN AN ORGANIZED HEALTH CARE EDUCATION/TRAINING PROGRAM

## 2018-10-26 PROCEDURE — 3600000004 HC SURGERY LEVEL 4 BASE: Performed by: ORTHOPAEDIC SURGERY

## 2018-10-26 PROCEDURE — 2720000010 HC SURG SUPPLY STERILE: Performed by: ORTHOPAEDIC SURGERY

## 2018-10-26 PROCEDURE — 7100000011 HC PHASE II RECOVERY - ADDTL 15 MIN: Performed by: ORTHOPAEDIC SURGERY

## 2018-10-26 PROCEDURE — 3600000014 HC SURGERY LEVEL 4 ADDTL 15MIN: Performed by: ORTHOPAEDIC SURGERY

## 2018-10-26 PROCEDURE — 2580000003 HC RX 258: Performed by: ORTHOPAEDIC SURGERY

## 2018-10-26 PROCEDURE — 7100000010 HC PHASE II RECOVERY - FIRST 15 MIN: Performed by: ORTHOPAEDIC SURGERY

## 2018-10-26 PROCEDURE — 2500000003 HC RX 250 WO HCPCS: Performed by: NURSE PRACTITIONER

## 2018-10-26 PROCEDURE — 6370000000 HC RX 637 (ALT 250 FOR IP): Performed by: NURSE ANESTHETIST, CERTIFIED REGISTERED

## 2018-10-26 PROCEDURE — 3700000000 HC ANESTHESIA ATTENDED CARE: Performed by: ORTHOPAEDIC SURGERY

## 2018-10-26 PROCEDURE — 2709999900 HC NON-CHARGEABLE SUPPLY: Performed by: ORTHOPAEDIC SURGERY

## 2018-10-26 RX ORDER — FENTANYL CITRATE 50 UG/ML
50 INJECTION, SOLUTION INTRAMUSCULAR; INTRAVENOUS EVERY 10 MIN PRN
Status: DISCONTINUED | OUTPATIENT
Start: 2018-10-26 | End: 2018-10-26 | Stop reason: HOSPADM

## 2018-10-26 RX ORDER — ACETAMINOPHEN 325 MG/1
650 TABLET ORAL EVERY 4 HOURS PRN
Status: DISCONTINUED | OUTPATIENT
Start: 2018-10-26 | End: 2018-10-26 | Stop reason: HOSPADM

## 2018-10-26 RX ORDER — SODIUM CHLORIDE 0.9 % (FLUSH) 0.9 %
10 SYRINGE (ML) INJECTION EVERY 12 HOURS SCHEDULED
Status: DISCONTINUED | OUTPATIENT
Start: 2018-10-26 | End: 2018-10-26 | Stop reason: HOSPADM

## 2018-10-26 RX ORDER — MIDAZOLAM HYDROCHLORIDE 1 MG/ML
INJECTION INTRAMUSCULAR; INTRAVENOUS PRN
Status: DISCONTINUED | OUTPATIENT
Start: 2018-10-26 | End: 2018-10-26 | Stop reason: SDUPTHER

## 2018-10-26 RX ORDER — ROPIVACAINE HYDROCHLORIDE 5 MG/ML
INJECTION, SOLUTION EPIDURAL; INFILTRATION; PERINEURAL PRN
Status: DISCONTINUED | OUTPATIENT
Start: 2018-10-26 | End: 2018-10-26 | Stop reason: SDUPTHER

## 2018-10-26 RX ORDER — ONDANSETRON 2 MG/ML
4 INJECTION INTRAMUSCULAR; INTRAVENOUS EVERY 6 HOURS PRN
Status: DISCONTINUED | OUTPATIENT
Start: 2018-10-26 | End: 2018-10-26 | Stop reason: HOSPADM

## 2018-10-26 RX ORDER — MORPHINE SULFATE 2 MG/ML
2 INJECTION, SOLUTION INTRAMUSCULAR; INTRAVENOUS
Status: DISCONTINUED | OUTPATIENT
Start: 2018-10-26 | End: 2018-10-26 | Stop reason: HOSPADM

## 2018-10-26 RX ORDER — SODIUM CHLORIDE, SODIUM LACTATE, POTASSIUM CHLORIDE, CALCIUM CHLORIDE 600; 310; 30; 20 MG/100ML; MG/100ML; MG/100ML; MG/100ML
INJECTION, SOLUTION INTRAVENOUS CONTINUOUS
Status: DISCONTINUED | OUTPATIENT
Start: 2018-10-26 | End: 2018-10-26 | Stop reason: HOSPADM

## 2018-10-26 RX ORDER — SODIUM CHLORIDE 0.9 % (FLUSH) 0.9 %
10 SYRINGE (ML) INJECTION PRN
Status: DISCONTINUED | OUTPATIENT
Start: 2018-10-26 | End: 2018-10-26 | Stop reason: HOSPADM

## 2018-10-26 RX ORDER — FENTANYL CITRATE 50 UG/ML
INJECTION, SOLUTION INTRAMUSCULAR; INTRAVENOUS PRN
Status: DISCONTINUED | OUTPATIENT
Start: 2018-10-26 | End: 2018-10-26 | Stop reason: SDUPTHER

## 2018-10-26 RX ORDER — ONDANSETRON 2 MG/ML
4 INJECTION INTRAMUSCULAR; INTRAVENOUS
Status: DISCONTINUED | OUTPATIENT
Start: 2018-10-26 | End: 2018-10-26 | Stop reason: HOSPADM

## 2018-10-26 RX ORDER — ROCURONIUM BROMIDE 10 MG/ML
INJECTION, SOLUTION INTRAVENOUS PRN
Status: DISCONTINUED | OUTPATIENT
Start: 2018-10-26 | End: 2018-10-26 | Stop reason: SDUPTHER

## 2018-10-26 RX ORDER — SODIUM CHLORIDE 9 MG/ML
50 INJECTION, SOLUTION INTRAVENOUS CONTINUOUS
Status: DISCONTINUED | OUTPATIENT
Start: 2018-10-26 | End: 2018-10-26 | Stop reason: HOSPADM

## 2018-10-26 RX ORDER — PROPOFOL 10 MG/ML
INJECTION, EMULSION INTRAVENOUS PRN
Status: DISCONTINUED | OUTPATIENT
Start: 2018-10-26 | End: 2018-10-26 | Stop reason: SDUPTHER

## 2018-10-26 RX ORDER — MAGNESIUM HYDROXIDE 1200 MG/15ML
LIQUID ORAL CONTINUOUS PRN
Status: DISCONTINUED | OUTPATIENT
Start: 2018-10-26 | End: 2018-10-26 | Stop reason: HOSPADM

## 2018-10-26 RX ORDER — SUCCINYLCHOLINE/SOD CL,ISO/PF 100 MG/5ML
SYRINGE (ML) INTRAVENOUS PRN
Status: DISCONTINUED | OUTPATIENT
Start: 2018-10-26 | End: 2018-10-26 | Stop reason: SDUPTHER

## 2018-10-26 RX ORDER — OXYCODONE HYDROCHLORIDE AND ACETAMINOPHEN 5; 325 MG/1; MG/1
2 TABLET ORAL EVERY 4 HOURS PRN
Status: DISCONTINUED | OUTPATIENT
Start: 2018-10-26 | End: 2018-10-26 | Stop reason: HOSPADM

## 2018-10-26 RX ORDER — OXYCODONE HYDROCHLORIDE AND ACETAMINOPHEN 5; 325 MG/1; MG/1
1 TABLET ORAL EVERY 4 HOURS PRN
Status: DISCONTINUED | OUTPATIENT
Start: 2018-10-26 | End: 2018-10-26 | Stop reason: HOSPADM

## 2018-10-26 RX ORDER — HYDROCODONE BITARTRATE AND ACETAMINOPHEN 5; 325 MG/1; MG/1
2 TABLET ORAL PRN
Status: DISCONTINUED | OUTPATIENT
Start: 2018-10-26 | End: 2018-10-26 | Stop reason: HOSPADM

## 2018-10-26 RX ORDER — LIDOCAINE HYDROCHLORIDE 10 MG/ML
1 INJECTION, SOLUTION EPIDURAL; INFILTRATION; INTRACAUDAL; PERINEURAL
Status: COMPLETED | OUTPATIENT
Start: 2018-10-26 | End: 2018-10-26

## 2018-10-26 RX ORDER — MORPHINE SULFATE 4 MG/ML
4 INJECTION, SOLUTION INTRAMUSCULAR; INTRAVENOUS
Status: DISCONTINUED | OUTPATIENT
Start: 2018-10-26 | End: 2018-10-26 | Stop reason: HOSPADM

## 2018-10-26 RX ORDER — DIPHENHYDRAMINE HYDROCHLORIDE 50 MG/ML
12.5 INJECTION INTRAMUSCULAR; INTRAVENOUS
Status: DISCONTINUED | OUTPATIENT
Start: 2018-10-26 | End: 2018-10-26 | Stop reason: HOSPADM

## 2018-10-26 RX ORDER — HYDROCODONE BITARTRATE AND ACETAMINOPHEN 5; 325 MG/1; MG/1
1 TABLET ORAL PRN
Status: DISCONTINUED | OUTPATIENT
Start: 2018-10-26 | End: 2018-10-26 | Stop reason: HOSPADM

## 2018-10-26 RX ORDER — MEPERIDINE HYDROCHLORIDE 25 MG/ML
12.5 INJECTION INTRAMUSCULAR; INTRAVENOUS; SUBCUTANEOUS EVERY 5 MIN PRN
Status: DISCONTINUED | OUTPATIENT
Start: 2018-10-26 | End: 2018-10-26 | Stop reason: HOSPADM

## 2018-10-26 RX ORDER — METOCLOPRAMIDE HYDROCHLORIDE 5 MG/ML
10 INJECTION INTRAMUSCULAR; INTRAVENOUS
Status: DISCONTINUED | OUTPATIENT
Start: 2018-10-26 | End: 2018-10-26 | Stop reason: HOSPADM

## 2018-10-26 RX ORDER — LIDOCAINE HYDROCHLORIDE 20 MG/ML
INJECTION, SOLUTION INFILTRATION; PERINEURAL PRN
Status: DISCONTINUED | OUTPATIENT
Start: 2018-10-26 | End: 2018-10-26 | Stop reason: SDUPTHER

## 2018-10-26 RX ADMIN — SODIUM CHLORIDE, POTASSIUM CHLORIDE, SODIUM LACTATE AND CALCIUM CHLORIDE: 600; 310; 30; 20 INJECTION, SOLUTION INTRAVENOUS at 13:11

## 2018-10-26 RX ADMIN — ROCURONIUM BROMIDE 40 MG: 10 INJECTION INTRAVENOUS at 15:29

## 2018-10-26 RX ADMIN — LIDOCAINE HYDROCHLORIDE 3 ML: 20 JELLY TOPICAL at 15:17

## 2018-10-26 RX ADMIN — PROPOFOL 200 MG: 10 INJECTION, EMULSION INTRAVENOUS at 15:16

## 2018-10-26 RX ADMIN — FENTANYL CITRATE 50 MCG: 50 INJECTION, SOLUTION INTRAMUSCULAR; INTRAVENOUS at 15:35

## 2018-10-26 RX ADMIN — Medication 160 MG: at 15:16

## 2018-10-26 RX ADMIN — PROPOFOL 100 MG: 10 INJECTION, EMULSION INTRAVENOUS at 15:39

## 2018-10-26 RX ADMIN — LIDOCAINE HYDROCHLORIDE 0.1 ML: 10 INJECTION, SOLUTION EPIDURAL; INFILTRATION; INTRACAUDAL; PERINEURAL at 13:11

## 2018-10-26 RX ADMIN — MIDAZOLAM HYDROCHLORIDE 4 MG: 1 INJECTION, SOLUTION INTRAMUSCULAR; INTRAVENOUS at 14:07

## 2018-10-26 RX ADMIN — FENTANYL CITRATE 50 MCG: 50 INJECTION, SOLUTION INTRAMUSCULAR; INTRAVENOUS at 15:16

## 2018-10-26 RX ADMIN — LIDOCAINE HYDROCHLORIDE 5 ML: 20 INJECTION, SOLUTION INFILTRATION; PERINEURAL at 15:16

## 2018-10-26 RX ADMIN — DEXTROSE MONOHYDRATE 3 G: 50 INJECTION, SOLUTION INTRAVENOUS at 15:09

## 2018-10-26 RX ADMIN — ROPIVACAINE HYDROCHLORIDE 40 ML: 5 INJECTION, SOLUTION EPIDURAL; INFILTRATION; PERINEURAL at 14:12

## 2018-10-26 RX ADMIN — SUGAMMADEX 200 MG: 100 INJECTION, SOLUTION INTRAVENOUS at 15:57

## 2018-10-26 ASSESSMENT — PULMONARY FUNCTION TESTS
PIF_VALUE: 27
PIF_VALUE: 23
PIF_VALUE: 24
PIF_VALUE: 24
PIF_VALUE: 23
PIF_VALUE: 1
PIF_VALUE: 22
PIF_VALUE: 24
PIF_VALUE: 24
PIF_VALUE: 1
PIF_VALUE: 22
PIF_VALUE: 23
PIF_VALUE: 23
PIF_VALUE: 24
PIF_VALUE: 15
PIF_VALUE: 1
PIF_VALUE: 1
PIF_VALUE: 24
PIF_VALUE: 1
PIF_VALUE: 23
PIF_VALUE: 24
PIF_VALUE: 1
PIF_VALUE: 22
PIF_VALUE: 30
PIF_VALUE: 22
PIF_VALUE: 1
PIF_VALUE: 24
PIF_VALUE: 20
PIF_VALUE: 23
PIF_VALUE: 22
PIF_VALUE: 23
PIF_VALUE: 24
PIF_VALUE: 24
PIF_VALUE: 22
PIF_VALUE: 24
PIF_VALUE: 1
PIF_VALUE: 2
PIF_VALUE: 23
PIF_VALUE: 24
PIF_VALUE: 22
PIF_VALUE: 1
PIF_VALUE: 1
PIF_VALUE: 25
PIF_VALUE: 22
PIF_VALUE: 22
PIF_VALUE: 24
PIF_VALUE: 1
PIF_VALUE: 22
PIF_VALUE: 24
PIF_VALUE: 24
PIF_VALUE: 16
PIF_VALUE: 24
PIF_VALUE: 31
PIF_VALUE: 23
PIF_VALUE: 23
PIF_VALUE: 32
PIF_VALUE: 24

## 2018-10-26 ASSESSMENT — PAIN - FUNCTIONAL ASSESSMENT: PAIN_FUNCTIONAL_ASSESSMENT: 0-10

## 2018-10-26 NOTE — ANESTHESIA POSTPROCEDURE EVALUATION
Department of Anesthesiology  Postprocedure Note    Patient: Denver Serene  MRN: [id number]  YOB: 1960  Date of evaluation: 10/26/2018  Time:  4:15 PM     Procedure Summary     Date:  10/26/18 Room / Location:  [place] [address] / [facility] OR    Anesthesia Start:  1509 Anesthesia Stop:      Procedure:  RIGHT  SHOULDER ARTHROSCOPY SUBACROMIAL DECOMPRESSION (Right Shoulder) Diagnosis:  (RIGHT SHOULDER IMPINGEMENT SYNDROME )    Surgeon:  Angela Castañeda MD Responsible Provider:  Edna Rushing DO    Anesthesia Type:  general, regional ASA Status:  4          Anesthesia Type: general, regional    Kevon Phase I: Kevon Score: 10    Kevon Phase II:      Last vitals: Reviewed and per EMR flowsheets.        Anesthesia Post Evaluation    Patient location during evaluation: PACU  Patient participation: complete - patient participated  Level of consciousness: awake  Pain score: 0  Airway patency: patent  Nausea & Vomiting: no vomiting and no nausea  Complications: no  Cardiovascular status: hemodynamically stable  Respiratory status: acceptable and face mask  Hydration status: stable

## 2018-10-26 NOTE — ANESTHESIA PROCEDURE NOTES
Peripheral Block    Patient location during procedure: pre-op  Start time: 10/26/2018 2:07 PM  End time: 10/26/2018 2:13 PM  Staffing  Anesthesiologist: Cris Porter  Performed: anesthesiologist   Preanesthetic Checklist  Completed: patient identified, site marked, surgical consent, pre-op evaluation, timeout performed, IV checked, risks and benefits discussed, monitors and equipment checked, anesthesia consent given, oxygen available and patient being monitored  Peripheral Block  Patient position: supine  Prep: ChloraPrep  Patient monitoring: cardiac monitor, continuous pulse ox, frequent blood pressure checks and IV access  Block type: Brachial plexus  Laterality: right  Injection technique: single-shot  Procedures: ultrasound guided and nerve stimulator  Local infiltration: ropivacaine  Infiltration strength: 0.5 %  Dose: 40 mL  Interscalene  Provider prep: mask and sterile gloves  Local infiltration: ropivacaine  Needle  Needle type: combined needle/nerve stimulator   Needle gauge: 22 G  Needle length: 5 cm  Needle localization: anatomical landmarks, ultrasound guidance and nerve stimulator  Assessment  Injection assessment: negative aspiration for heme, no paresthesia on injection and local visualized surrounding nerve on ultrasound  Paresthesia pain: immediately resolved  Slow fractionated injection: yes  Hemodynamics: stable  Additional Notes  Ultrasound image printed and saved in patient chart    Reason for block: post-op pain management and at surgeon's request

## 2018-10-26 NOTE — H&P
[address]                     [address], [address] [facility]                             HISTORY AND PHYSICAL    PATIENT NAME: Mayra Hicks                       :         1960  MED REC NO:   [id number]                            ROOM:  ACCOUNT NO:   [de-identified]                           ADMIT DATE:  10/26/2018  PROVIDER:     Court Franco PA-C    FAMILY PHYSICIAN:  Serena Garcia MD.    CHIEF COMPLAINT:  Right shoulder pain. HISTORY OF PRESENT ILLNESS:  The patient has had right shoulder pain  over six months now, night pain, weakness. He tried conservative  therapy. It did provide help, but now, the pain has returned and the  conservative therapy is no longer helping. MRI revealed significant  shoulder impingement, AC joint impingement. After risks, benefits,  alternatives, discussed, he elects to proceed with surgical  intervention for right shoulder arthroscopy, subacromial  decompression, extensive debridement. Surgery will be performed  10/26/2018 at [facility] in [facility]. We will order scalene  nerve block preoperatively. PAST MEDICAL HISTORY:  Significant for diabetes, osteoarthritis,  coronary artery disease, hypertension, acid reflux. CURRENT MEDICATIONS:  Xarelto, Toujeo, spironolactone, sotalol,  pantoprazole, nitroglycerin, metoprolol, metformin, losartan,  isosorbide, Invokana, glipizide, fluticasone, atorvastatin, and  aspirin. ALLERGIES:  ACTOS. PAST SURGICAL HISTORY:  Previous surgical history significant for  pacemaker and ablation due to atrial fibrillation. Denies any  problems with anesthetic. SOCIAL HISTORY:  The patient is currently employed, single. Denies  substance abuse. Denies alcohol use. Does not currently smoke but  has a smoking history. FAMILY HISTORY:  Significant for osteoarthritis and coronary artery  disease. REVIEW OF SYSTEMS:  Noncontributory.     PHYSICAL EXAMINATION:  GENERAL:

## 2018-10-27 RX ORDER — METFORMIN HYDROCHLORIDE 500 MG/1
TABLET, EXTENDED RELEASE ORAL
Qty: 120 TABLET | Refills: 1 | Status: SHIPPED | OUTPATIENT
Start: 2018-10-27 | End: 2018-12-29 | Stop reason: SDUPTHER

## 2018-11-06 ENCOUNTER — HOSPITAL ENCOUNTER (OUTPATIENT)
Dept: PHYSICAL THERAPY | Age: 58
Setting detail: THERAPIES SERIES
Discharge: HOME | End: 2018-11-06
Payer: COMMERCIAL

## 2018-11-06 PROCEDURE — 97140 MANUAL THERAPY 1/> REGIONS: CPT

## 2018-11-06 PROCEDURE — 97162 PT EVAL MOD COMPLEX 30 MIN: CPT

## 2018-11-06 PROCEDURE — 97110 THERAPEUTIC EXERCISES: CPT

## 2018-11-06 RX ORDER — LOSARTAN POTASSIUM AND HYDROCHLOROTHIAZIDE 25; 100 MG/1; MG/1
TABLET ORAL
Qty: 90 TABLET | Refills: 0 | Status: SHIPPED | OUTPATIENT
Start: 2018-11-06 | End: 2019-02-07 | Stop reason: SDUPTHER

## 2018-11-06 ASSESSMENT — PAIN DESCRIPTION - ORIENTATION: ORIENTATION: RIGHT;ANTERIOR;UPPER

## 2018-11-06 ASSESSMENT — PAIN SCALES - GENERAL: PAINLEVEL_OUTOF10: 3

## 2018-11-06 ASSESSMENT — PAIN DESCRIPTION - LOCATION: LOCATION: SHOULDER

## 2018-11-06 ASSESSMENT — PAIN DESCRIPTION - PAIN TYPE: TYPE: SURGICAL PAIN

## 2018-11-06 ASSESSMENT — PAIN DESCRIPTION - DESCRIPTORS: DESCRIPTORS: ACHING

## 2018-11-06 NOTE — PROGRESS NOTES
Treat  Frequency/Duration:  Plan  Times per week: 2-3  Plan weeks: 3-4  Current Treatment Recommendations: Strengthening, ROM, Neuromuscular Re-education, Manual Therapy - Soft Tissue Mobilization, Manual Therapy - Joint Manipulation, Modalities, Home Exercise Program     Precautions:             ?     Patient Status:[x] Continue/ Initiate plan of Care    [] Discharge PT. Recommend pt continue with HEP. [] Additional visits requested, Please re-certify for additional visits:          Signature: Electronically signed by Renee Coley PT on 11/6/18 at 12:28 PM      If you have any questions or concerns, please don't hesitate to call. Thank you for your referral.    I have reviewed this plan of care and certify a need for medically necessary rehabilitation services.     Physician Signature:__________________________________________________________  Date:  Please sign and return

## 2018-11-08 ENCOUNTER — HOSPITAL ENCOUNTER (OUTPATIENT)
Dept: PHYSICAL THERAPY | Age: 58
Setting detail: THERAPIES SERIES
Discharge: HOME | End: 2018-11-08
Payer: COMMERCIAL

## 2018-11-08 PROCEDURE — 97110 THERAPEUTIC EXERCISES: CPT

## 2018-11-08 PROCEDURE — 97140 MANUAL THERAPY 1/> REGIONS: CPT

## 2018-11-08 ASSESSMENT — PAIN DESCRIPTION - DESCRIPTORS: DESCRIPTORS: ACHING

## 2018-11-08 NOTE — PROGRESS NOTES
[id number] [address]  Outpatient Physical Therapy    Treatment Note        Date: 2018  Patient: Trice Figueroa  : 1960  ACCT #: [de-identified]  Referring Practitioner: Jackelin Acosta  Diagnosis: R: Adhesive Capsulits and Impingement; s/p arthroscopic sx 10-26-18    Visit Information:  PT Visit Information  Onset Date: 18  PT Insurance Information: [employer]  Total # of Visits Approved: 20  Total # of Visits to Date: 2  No Show: 0  Canceled Appointment: 0  Progress Note Counter:     Comments: RTD 18  HEP Compliance:  [x] Good [] Fair [] Poor [] Reports not doing due to:  Vital Signs  Patient Currently in Pain: Yes   Pain Screening  Patient Currently in Pain: Yes  Pain Assessment  Pain Descriptors: Aching    OBJECTIVE:   Exercises  Exercise 1: Pulleys  flexion 4 minutes   Exercise 2: scap retraction 5s x 10  Exercise 3: R Table slides flex/scaption 10s x 5 ea   Exercise 4: Codmans 4 way x 20 ea   Exercise 6: Wand exs flex, scaption, ER  supine 5\"x10   Exercise 7: Shldr Isos 5s *  Exercise 20: HEP: scap retraction, tablw slides, codmans  ROM: [] NT  [] ROM measurements:  PROM RUE (degrees)  RUE General PROM: GHJ: flex 125 ; abd 70;  ER 0 deg (at 50  deg abd;)       Manual:   Manual therapy  PROM: GHJ flex, Abd ER, IR x  10 mins  Soft Tissue Mobalization: 5 mins gentle STM , MFR R bicep belly  w/ dec tissue tension noted post     Modalities:  Modalities  Cryotherapy (Minutes\Location): 10 mins to R shldr in sitting      *Indicates exercise, modality, or manual techniques to be initiated when appropriate    Assessment: Body structures, Functions, Activity limitations: Decreased ADL status, Decreased ROM, Decreased strength  Assessment: continued exercises per POC to improve right shoulder mobility. patient guarded with manual however able to decrease with cuing. Most limited with right shoulder ER. Treatment Diagnosis: R shldr Pain; Impaired Strength;  Impaired

## 2018-11-12 ENCOUNTER — HOSPITAL ENCOUNTER (OUTPATIENT)
Dept: PHYSICAL THERAPY | Age: 58
Setting detail: THERAPIES SERIES
Discharge: HOME | End: 2018-11-12
Payer: COMMERCIAL

## 2018-11-12 PROCEDURE — 97110 THERAPEUTIC EXERCISES: CPT

## 2018-11-12 PROCEDURE — 97140 MANUAL THERAPY 1/> REGIONS: CPT

## 2018-11-12 ASSESSMENT — PAIN DESCRIPTION - DESCRIPTORS: DESCRIPTORS: TIGHTNESS

## 2018-11-12 ASSESSMENT — PAIN DESCRIPTION - LOCATION: LOCATION: SHOULDER

## 2018-11-12 ASSESSMENT — PAIN SCALES - GENERAL: PAINLEVEL_OUTOF10: 2

## 2018-11-12 ASSESSMENT — PAIN DESCRIPTION - ORIENTATION: ORIENTATION: RIGHT;ANTERIOR

## 2018-11-12 ASSESSMENT — PAIN DESCRIPTION - PAIN TYPE: TYPE: SURGICAL PAIN

## 2018-11-15 ENCOUNTER — HOSPITAL ENCOUNTER (OUTPATIENT)
Dept: PHYSICAL THERAPY | Age: 58
Setting detail: THERAPIES SERIES
Discharge: HOME | End: 2018-11-15
Payer: COMMERCIAL

## 2018-11-15 PROCEDURE — 97140 MANUAL THERAPY 1/> REGIONS: CPT

## 2018-11-15 PROCEDURE — 97110 THERAPEUTIC EXERCISES: CPT

## 2018-11-15 ASSESSMENT — PAIN SCALES - GENERAL: PAINLEVEL_OUTOF10: 1

## 2018-11-15 ASSESSMENT — PAIN DESCRIPTION - LOCATION: LOCATION: SHOULDER

## 2018-11-15 ASSESSMENT — PAIN DESCRIPTION - ORIENTATION: ORIENTATION: RIGHT;ANTERIOR

## 2018-11-15 ASSESSMENT — PAIN DESCRIPTION - DESCRIPTORS: DESCRIPTORS: TIGHTNESS

## 2018-11-15 ASSESSMENT — PAIN DESCRIPTION - PAIN TYPE: TYPE: SURGICAL PAIN

## 2018-11-20 ENCOUNTER — HOSPITAL ENCOUNTER (OUTPATIENT)
Dept: PHYSICAL THERAPY | Age: 58
Setting detail: THERAPIES SERIES
Discharge: HOME | End: 2018-11-20
Payer: COMMERCIAL

## 2018-11-20 PROCEDURE — 97110 THERAPEUTIC EXERCISES: CPT

## 2018-11-20 PROCEDURE — 97140 MANUAL THERAPY 1/> REGIONS: CPT

## 2018-11-20 ASSESSMENT — PAIN DESCRIPTION - DESCRIPTORS: DESCRIPTORS: ACHING

## 2018-11-20 ASSESSMENT — PAIN DESCRIPTION - LOCATION: LOCATION: SHOULDER

## 2018-11-20 ASSESSMENT — PAIN DESCRIPTION - ORIENTATION: ORIENTATION: RIGHT

## 2018-11-20 ASSESSMENT — PAIN SCALES - GENERAL: PAINLEVEL_OUTOF10: 4

## 2018-11-20 ASSESSMENT — PAIN DESCRIPTION - PAIN TYPE: TYPE: SURGICAL PAIN

## 2018-11-20 NOTE — PROGRESS NOTES
tightness through pec and sub scap;  Treatment Diagnosis: R shldr Pain; Impaired Strength; Impaired ROM          Goals:  Short term goals  Time Frame for Short term goals: 9-12 visits  Short term goal 1: dec R shldr pain to </= 4/10 at worse  Short term goal 2: Inc R shldr AROM to >/=: flex and abd 115 and IR and ER 45deg in 50 deg GHJ abd to improve ADLs  Short term goal 3: Inc strength R GHJ >/= 4/5 to improve ADL's   Short term goal 4: Inc UEFI to >/= 60/80       Progress toward goals:toweard ROM    POST-PAIN       Pain Rating (0-10 pain scale): 1  /10   Location and pain description same as pre-treatment unless indicated. Action: [] NA   [x] Perform HEP  [] Meds as prescribed  [] Modalities as prescribed   [] Call Physician     Frequency/Duration:  Plan  Times per week: 2-3  Plan weeks: 3-4  Specific instructions for Next Treatment: 11-20-18: Script for Aggressive ROM and no resistance  Current Treatment Recommendations: Strengthening, ROM, Neuromuscular Re-education, Manual Therapy - Soft Tissue Mobilization, Manual Therapy - Joint Manipulation, Modalities, Home Exercise Program     Pt to continue current HEP. See objective section for any therapeutic exercise changes, additions or modifications this date.          PT Individual Minutes  Time In: 2460  Time Out: 3584  Minutes: 52  Timed Code Treatment Minutes: 40 Minutes  Procedure Minutes:10    Signature:  Electronically signed by Brian Wooten PT on 11/20/18 at 9:23 AM

## 2018-11-23 ENCOUNTER — HOSPITAL ENCOUNTER (OUTPATIENT)
Dept: PHYSICAL THERAPY | Age: 58
Setting detail: THERAPIES SERIES
Discharge: HOME | End: 2018-11-23
Payer: COMMERCIAL

## 2018-11-23 PROCEDURE — 97110 THERAPEUTIC EXERCISES: CPT

## 2018-11-23 PROCEDURE — 97140 MANUAL THERAPY 1/> REGIONS: CPT

## 2018-11-23 ASSESSMENT — PAIN SCALES - GENERAL: PAINLEVEL_OUTOF10: 2

## 2018-11-23 ASSESSMENT — PAIN DESCRIPTION - DESCRIPTORS: DESCRIPTORS: ACHING

## 2018-11-23 ASSESSMENT — PAIN DESCRIPTION - ORIENTATION: ORIENTATION: RIGHT

## 2018-11-23 ASSESSMENT — PAIN DESCRIPTION - LOCATION: LOCATION: SHOULDER

## 2018-11-23 ASSESSMENT — PAIN DESCRIPTION - PAIN TYPE: TYPE: SURGICAL PAIN

## 2018-11-26 ENCOUNTER — HOSPITAL ENCOUNTER (OUTPATIENT)
Dept: PHYSICAL THERAPY | Age: 58
Setting detail: THERAPIES SERIES
Discharge: HOME | End: 2018-11-26
Payer: COMMERCIAL

## 2018-11-26 PROCEDURE — 97110 THERAPEUTIC EXERCISES: CPT

## 2018-11-26 PROCEDURE — 97140 MANUAL THERAPY 1/> REGIONS: CPT

## 2018-11-26 ASSESSMENT — PAIN SCALES - GENERAL: PAINLEVEL_OUTOF10: 1

## 2018-11-26 ASSESSMENT — PAIN DESCRIPTION - PAIN TYPE: TYPE: SURGICAL PAIN

## 2018-11-26 ASSESSMENT — PAIN DESCRIPTION - LOCATION: LOCATION: SHOULDER

## 2018-11-26 ASSESSMENT — PAIN DESCRIPTION - ORIENTATION: ORIENTATION: RIGHT

## 2018-11-28 ENCOUNTER — HOSPITAL ENCOUNTER (OUTPATIENT)
Dept: PHYSICAL THERAPY | Age: 58
Setting detail: THERAPIES SERIES
Discharge: HOME | End: 2018-11-28
Payer: COMMERCIAL

## 2018-11-28 PROCEDURE — 97110 THERAPEUTIC EXERCISES: CPT

## 2018-11-28 PROCEDURE — 97140 MANUAL THERAPY 1/> REGIONS: CPT

## 2018-11-28 ASSESSMENT — PAIN SCALES - GENERAL: PAINLEVEL_OUTOF10: 1

## 2018-11-28 ASSESSMENT — PAIN DESCRIPTION - ORIENTATION: ORIENTATION: RIGHT

## 2018-11-28 ASSESSMENT — PAIN DESCRIPTION - LOCATION: LOCATION: SHOULDER

## 2018-11-28 ASSESSMENT — PAIN DESCRIPTION - PAIN TYPE: TYPE: SURGICAL PAIN

## 2018-11-28 ASSESSMENT — PAIN DESCRIPTION - DESCRIPTORS: DESCRIPTORS: ACHING

## 2018-11-28 NOTE — PROGRESS NOTES
[id number] [address]  Outpatient Physical Therapy    Treatment Note        Date: 2018  Patient: Cece Conrad  : 1960  ACCT #: [de-identified]  Referring Practitioner: Francisco Kelley  Diagnosis: R: Adhesive Capsulits and Impingement; s/p arthroscopic sx 10-26-18    Visit Information:  PT Visit Information  Onset Date: 18  PT Insurance Information: [employer]  Total # of Visits Approved: 20  Total # of Visits to Date: 8  No Show: 0  Canceled Appointment: 0  Progress Note Counter:        Subjective: Pt reports increased pain last night, unsure of why. Pt reports dec pain to 1/10 this AM.  Comments: 18: Pt w/ script for Agressive ROM and no resistance  HEP Compliance:  [x] Good [] Fair [] Poor [] Reports not doing due to:    Vital Signs  Patient Currently in Pain: Yes   Pain Screening  Patient Currently in Pain: Yes  Pain Assessment  Pain Assessment: 0-10  Pain Level: 1  Pain Type: Surgical pain  Pain Location: Shoulder  Pain Orientation: Right  Pain Descriptors: Aching    OBJECTIVE:   Exercises  Exercise 1: Pulleys  flexion 3 minutes, scaption 3 min   Exercise 3: R wall slides flex and scaption 5''x10 ea  Exercise 8: Finger ladder on Lt 5''x5 flex level 23-24, scaption 21-22  Exercise 9: R UT stretch 3x30''  Exercise 20: HEP: wall slides D/C table slides, UT stretch    Strength: [x] NT  [] MMT completed:    ROM: [x] NT  [] ROM measurements:     Manual:   Manual therapy  PROM: GHJ flex, Abd ER, IR w/ end range holds and stretching   Soft Tissue Mobalization: Pec stretch with increased guarding, RUT  Other: 20 min total    Modalities:  Modalities  Moist heat: 10 mins to R shoulder in sitting to decrease pain and tightness     *Indicates exercise, modality, or manual techniques to be initiated when appropriate    Assessment:    Body structures, Functions, Activity limitations: Decreased ADL status, Decreased ROM, Decreased strength  Assessment: Cont'd tx focus on increasing R

## 2018-11-30 ENCOUNTER — HOSPITAL ENCOUNTER (OUTPATIENT)
Dept: PHYSICAL THERAPY | Age: 58
Setting detail: THERAPIES SERIES
Discharge: HOME | End: 2018-11-30
Payer: COMMERCIAL

## 2018-11-30 PROCEDURE — 97110 THERAPEUTIC EXERCISES: CPT

## 2018-11-30 PROCEDURE — 97140 MANUAL THERAPY 1/> REGIONS: CPT

## 2018-11-30 ASSESSMENT — PAIN DESCRIPTION - DESCRIPTORS: DESCRIPTORS: TIGHTNESS

## 2018-11-30 ASSESSMENT — PAIN DESCRIPTION - LOCATION: LOCATION: SHOULDER

## 2018-11-30 ASSESSMENT — PAIN DESCRIPTION - PAIN TYPE: TYPE: SURGICAL PAIN

## 2018-11-30 ASSESSMENT — PAIN DESCRIPTION - ORIENTATION: ORIENTATION: RIGHT

## 2018-11-30 ASSESSMENT — PAIN SCALES - GENERAL: PAINLEVEL_OUTOF10: 1

## 2018-11-30 NOTE — PROGRESS NOTES
[id number] [address]  Outpatient Physical Therapy    Treatment Note        Date: 2018  Patient: Stevan Downs  : 1960  ACCT #: [de-identified]  Referring Practitioner: Colton Estrada  Diagnosis: R: Adhesive Capsulits and Impingement; s/p arthroscopic sx 10-26-18    Visit Information:  PT Visit Information  Onset Date: 18  PT Insurance Information: [employer]  Total # of Visits Approved: 20  Total # of Visits to Date: 9  No Show: 0  Canceled Appointment: 0  Progress Note Counter:     Subjective: Pt reports no pain just stiff, 1/10. Comments: 18: Pt w/ script for Agressive ROM and no resistance  HEP Compliance:  [x] Good [] Fair [] Poor [] Reports not doing due to:    Vital Signs  Patient Currently in Pain: Yes   Pain Screening  Patient Currently in Pain: Yes  Pain Assessment  Pain Level: 1  Pain Type: Surgical pain  Pain Location: Shoulder  Pain Orientation: Right  Pain Descriptors: Tightness    OBJECTIVE:   Exercises  Exercise 1: Pulleys  flexion 3 minutes, scaption 3 min   Exercise 3: R wall slides flex and scaption 5''x10 ea  Exercise 6: Wand exs flex, scaption, ER  supine 5\"x10   Exercise 8: Finger ladder on Lt 5''x5 flex level 23-24, scaption 21-22  Exercise 9: R UT stretch 3x30''           Manual:   Manual therapy  PROM: GHJ flex, Abd ER, IR w/ end range holds and stretching   Soft Tissue Mobalization: Pec stretch with verbal cues to decreased guarding, RUT  Other: 17 min total    Modalities:  Modalities  Moist heat: 10 mins to R shoulder in sitting to decrease pain and tightness     *Indicates exercise, modality, or manual techniques to be initiated when appropriate    Assessment: Body structures, Functions, Activity limitations: Decreased ADL status, Decreased ROM, Decreased strength  Assessment: Cont'd tx focus on increasing R shoulder ROM. I VC's for dec compensations w/ scaption & to relax .  Pt reporting R UT tightness w/ relief post manual.  Treatment

## 2018-12-05 ENCOUNTER — HOSPITAL ENCOUNTER (OUTPATIENT)
Dept: PHYSICAL THERAPY | Age: 58
Setting detail: THERAPIES SERIES
Discharge: HOME | End: 2018-12-05
Payer: COMMERCIAL

## 2018-12-05 PROCEDURE — 97140 MANUAL THERAPY 1/> REGIONS: CPT

## 2018-12-05 PROCEDURE — 97110 THERAPEUTIC EXERCISES: CPT

## 2018-12-05 NOTE — PROGRESS NOTES
[id number] [address]  Outpatient Physical Therapy    Treatment Note        Date: 2018  Patient: Jami Woo  : 1960  ACCT #: [de-identified]  Referring Practitioner: Choe Standard  Diagnosis: R: Adhesive Capsulits and Impingement; s/p arthroscopic sx 10-26-18    Visit Information:  PT Visit Information  Onset Date: 18  PT Insurance Information: [employer]  Total # of Visits Approved: 20  Total # of Visits to Date: 10  No Show: 0  Canceled Appointment: 0  Progress Note Counter: 10/9-12    Subjective: Patient reports \"I am able to sleep on my right side now. \"   Comments: 18: Pt w/ script for Agressive ROM and no resistance  HEP Compliance:  [x] Good [] Fair [] Poor [] Reports not doing due to:    Vital Signs  Patient Currently in Pain: Denies   Pain Screening  Patient Currently in Pain: Denies    OBJECTIVE:   Exercises  Exercise 1: Pulleys  flexion 3 minutes, scaption 3 min   Exercise 3: R wall slides flex and scaption 5''x10 ea  Exercise 6: Wand exs flex, scaption, ER  supine 5\"x10   Exercise 8: Finger ladder on Lt 5''x5 flex level 23-24, scaption 21-22  Exercise 9: R UT stretch 3x30''   ROM: [] NT  [] ROM measurements:     PROM RUE (degrees)  RUE General PROM: GHJ: flex 145 deg, abduction 91, ER 30 at 45 degree abd, IR  15 degrees   Manual:   Manual therapy  PROM: GHJ flex, Abd ER, IR w/ end range holds and stretching   Other: 17 min total    Modalities:  Modalities  Moist heat: 10 mins to R shoulder in sitting to decrease pain and tightness     *Indicates exercise, modality, or manual techniques to be initiated when appropriate    Assessment: Body structures, Functions, Activity limitations: Decreased ADL status, Decreased ROM, Decreased strength  Assessment: patient very guarded with PROM- unable to decrease with cuing/ oscillations. Continues to be most limited with right shoulder ER/R. Treatment Diagnosis: R shldr Pain; Impaired Strength;  Impaired ROM  Prognosis:

## 2018-12-07 ENCOUNTER — HOSPITAL ENCOUNTER (OUTPATIENT)
Dept: PHYSICAL THERAPY | Age: 58
Setting detail: THERAPIES SERIES
Discharge: HOME | End: 2018-12-07
Payer: COMMERCIAL

## 2018-12-07 PROCEDURE — 97140 MANUAL THERAPY 1/> REGIONS: CPT

## 2018-12-07 PROCEDURE — 97110 THERAPEUTIC EXERCISES: CPT

## 2018-12-07 ASSESSMENT — PAIN SCALES - GENERAL: PAINLEVEL_OUTOF10: 2

## 2018-12-07 ASSESSMENT — PAIN DESCRIPTION - DESCRIPTORS: DESCRIPTORS: DISCOMFORT

## 2018-12-07 ASSESSMENT — PAIN DESCRIPTION - ORIENTATION: ORIENTATION: RIGHT

## 2018-12-07 ASSESSMENT — PAIN DESCRIPTION - LOCATION: LOCATION: SHOULDER

## 2018-12-07 NOTE — PROGRESS NOTES
[id number] [address]  Outpatient Physical Therapy    Treatment Note        Date: 2018  Patient: Jennifer Rollins  : 1960  ACCT #: [de-identified]  Referring Practitioner: Rochelle Malone  Diagnosis: R: Adhesive Capsulits and Impingement; s/p arthroscopic sx 10-26-18    Visit Information:  PT Visit Information  Onset Date: 18  [facility]  Total # of Visits Approved: 20  Total # of Visits to Date: 11  No Show: 0  Canceled Appointment: 0  Progress Note Counter:     Subjective: Pt reports 2/10 discomfort. Comments: 18: Pt w/ script for Agressive ROM and no resistance  HEP Compliance:  [x] Good [] Fair [] Poor [] Reports not doing due to:    Vital Signs  Patient Currently in Pain: Yes   Pain Screening  Patient Currently in Pain: Yes  Pain Assessment  Pain Level: 2  Pain Location: Shoulder  Pain Orientation: Right  Pain Descriptors: Discomfort    OBJECTIVE:   Exercises  Exercise 1: Pulleys  flexion 3 minutes, scaption 3 min   Exercise 3: R wall slides flex and scaption 5''x10 ea  Exercise 6: Wand exs flex, scaption, ER  supine 5\"x10   Exercise 8: Finger ladder on Lt 5''x5 flex level 23-24, scaption 21-22  Exercise 9: R UT stretch 3x30''       ROM: [] NT  [x] ROM measurements:      PROM RUE (degrees)  RUE General PROM: GHJ: flex 143 deg, abduction 123 deg., ER 34 deg. at 45 degree abd, IR  51 deg. Manual:   Manual therapy  PROM: GHJ flex, Abd ER, IR w/ end range holds and stretching   Soft Tissue Mobalization: Pec stretch with verbal cues to decreased guarding, RUT  Other: 15 min total    Modalities:  Modalities  Moist heat: 10 mins to R shoulder in sitting to decrease pain and tightness     *Indicates exercise, modality, or manual techniques to be initiated when appropriate    Assessment:         Body structures, Functions, Activity limitations: Decreased ADL status, Decreased ROM, Decreased strength  Assessment: Much verbal cueing to relax, pt mildly

## 2018-12-12 ENCOUNTER — HOSPITAL ENCOUNTER (OUTPATIENT)
Dept: PHYSICAL THERAPY | Age: 58
Setting detail: THERAPIES SERIES
Discharge: HOME | End: 2018-12-12
Payer: COMMERCIAL

## 2018-12-12 ASSESSMENT — PAIN DESCRIPTION - PAIN TYPE: TYPE: SURGICAL PAIN

## 2018-12-12 ASSESSMENT — PAIN SCALES - GENERAL: PAINLEVEL_OUTOF10: 1

## 2018-12-12 ASSESSMENT — PAIN DESCRIPTION - LOCATION: LOCATION: SHOULDER

## 2018-12-12 ASSESSMENT — PAIN DESCRIPTION - ORIENTATION: ORIENTATION: RIGHT

## 2018-12-18 ENCOUNTER — HOSPITAL ENCOUNTER (OUTPATIENT)
Dept: PHYSICAL THERAPY | Age: 58
Setting detail: THERAPIES SERIES
Discharge: HOME | End: 2018-12-18
Payer: COMMERCIAL

## 2018-12-18 PROCEDURE — 97110 THERAPEUTIC EXERCISES: CPT

## 2018-12-18 PROCEDURE — 97140 MANUAL THERAPY 1/> REGIONS: CPT

## 2018-12-19 ENCOUNTER — APPOINTMENT (OUTPATIENT)
Dept: PHYSICAL THERAPY | Age: 58
End: 2018-12-19
Payer: COMMERCIAL

## 2018-12-20 ENCOUNTER — HOSPITAL ENCOUNTER (OUTPATIENT)
Dept: PHYSICAL THERAPY | Age: 58
Setting detail: THERAPIES SERIES
Discharge: HOME | End: 2018-12-20
Payer: COMMERCIAL

## 2018-12-20 PROCEDURE — 97110 THERAPEUTIC EXERCISES: CPT

## 2018-12-20 PROCEDURE — 97140 MANUAL THERAPY 1/> REGIONS: CPT

## 2018-12-20 NOTE — PROGRESS NOTES
[id number] [address]  Outpatient Physical Therapy    Treatment Note        Date: 2018  Patient: Apolonia Crury  : 1960  ACCT #: [de-identified]  Referring Practitioner: Lucinda Dudley  Diagnosis: R: Adhesive Capsulits and Impingement; s/p arthroscopic sx 10-26-18    Visit Information:  PT Visit Information  Onset Date: 18  [facility]  Total # of Visits Approved: 20  Total # of Visits to Date: 14  No Show: 0  Canceled Appointment: 0  Progress Note Counter:     Subjective: Pt plan to RTW 18 and cont w/ therapy for improved ROM and strengthening. Comments: Pt has no F/U   HEP Compliance:  [x] Good [] Fair [] Poor [] Reports not doing due to:    Vital Signs  Patient Currently in Pain: Denies   Pain Screening  Patient Currently in Pain: Denies    OBJECTIVE:   Exercises  Exercise 3: R wall slides flex and scaption 5''x10 ea  Exercise 13: UBE L2.0 2.5'F/2. 5'R  Exercise 14: 6-way shoulder w/ LTB x10ea  Exercise 20: HEP: 6-way shoulder     Strength: [x] NT  [] MMT completed:    ROM: [x] NT  [] ROM measurements:     Manual:   Manual therapy  PROM: GHJ flex, Abd ER, IR w/ end range holds and stretching 10 min    Modalities:  Modalities  Moist heat: 10 mins to R shoulder in sitting to decrease pain and tightness     *Indicates exercise, modality, or manual techniques to be initiated when appropriate    Assessment: Body structures, Functions, Activity limitations: Decreased ADL status, Decreased ROM, Decreased strength  Assessment: Initiated multiple strengthening ex's this date w/ good adri demo'd by pt. Pt denies pain duirng session except for at end range of motion. Treatment Diagnosis: R shldr Pain; Impaired Strength; Impaired ROM  Prognosis: Good       Goals:  Short term goals  Time Frame for Short term goals: 9-12 visits  Short term goal 1: dec R shldr pain to </= 4/10 at worse  Short term goal 2:  Inc R shldr AROM to >/=: flex and abd 115 and IR and ER

## 2018-12-27 RX ORDER — METFORMIN HYDROCHLORIDE 500 MG/1
TABLET, EXTENDED RELEASE ORAL
Qty: 120 TABLET | Refills: 0 | OUTPATIENT
Start: 2018-12-27

## 2019-01-02 ENCOUNTER — HOSPITAL ENCOUNTER (OUTPATIENT)
Dept: PHYSICAL THERAPY | Age: 59
Setting detail: THERAPIES SERIES
Discharge: HOME | End: 2019-01-02
Payer: COMMERCIAL

## 2019-01-02 PROCEDURE — 97110 THERAPEUTIC EXERCISES: CPT

## 2019-01-02 RX ORDER — METFORMIN HYDROCHLORIDE 500 MG/1
TABLET, EXTENDED RELEASE ORAL
Qty: 120 TABLET | Refills: 1 | Status: SHIPPED | OUTPATIENT
Start: 2019-01-02 | End: 2019-02-25 | Stop reason: SDUPTHER

## 2019-01-02 ASSESSMENT — PAIN SCALES - GENERAL: PAINLEVEL_OUTOF10: 0

## 2019-01-07 ENCOUNTER — HOSPITAL ENCOUNTER (OUTPATIENT)
Dept: PHYSICAL THERAPY | Age: 59
Setting detail: THERAPIES SERIES
Discharge: HOME | End: 2019-01-07
Payer: COMMERCIAL

## 2019-01-08 ENCOUNTER — OFFICE VISIT (OUTPATIENT)
Dept: ENDOCRINOLOGY | Age: 59
End: 2019-01-08
Payer: COMMERCIAL

## 2019-01-08 VITALS
SYSTOLIC BLOOD PRESSURE: 130 MMHG | HEIGHT: 74 IN | HEART RATE: 64 BPM | BODY MASS INDEX: 40.43 KG/M2 | DIASTOLIC BLOOD PRESSURE: 86 MMHG | WEIGHT: 315 LBS

## 2019-01-08 DIAGNOSIS — E11.8 TYPE 2 DIABETES MELLITUS WITH COMPLICATION, UNSPECIFIED WHETHER LONG TERM INSULIN USE: Primary | ICD-10-CM

## 2019-01-08 DIAGNOSIS — L60.0 INGROWN NAIL OF GREAT TOE OF RIGHT FOOT: ICD-10-CM

## 2019-01-08 DIAGNOSIS — E66.01 MORBID OBESITY (HCC): ICD-10-CM

## 2019-01-08 LAB
GLUCOSE BLD-MCNC: 73 MG/DL
HBA1C MFR BLD: 12.5 %

## 2019-01-08 PROCEDURE — 1036F TOBACCO NON-USER: CPT | Performed by: INTERNAL MEDICINE

## 2019-01-08 PROCEDURE — 99213 OFFICE O/P EST LOW 20 MIN: CPT | Performed by: INTERNAL MEDICINE

## 2019-01-08 PROCEDURE — 83036 HEMOGLOBIN GLYCOSYLATED A1C: CPT | Performed by: INTERNAL MEDICINE

## 2019-01-08 PROCEDURE — G8598 ASA/ANTIPLAT THER USED: HCPCS | Performed by: INTERNAL MEDICINE

## 2019-01-08 PROCEDURE — 3046F HEMOGLOBIN A1C LEVEL >9.0%: CPT | Performed by: INTERNAL MEDICINE

## 2019-01-08 PROCEDURE — 3017F COLORECTAL CA SCREEN DOC REV: CPT | Performed by: INTERNAL MEDICINE

## 2019-01-08 PROCEDURE — G8417 CALC BMI ABV UP PARAM F/U: HCPCS | Performed by: INTERNAL MEDICINE

## 2019-01-08 PROCEDURE — 82962 GLUCOSE BLOOD TEST: CPT | Performed by: INTERNAL MEDICINE

## 2019-01-08 PROCEDURE — G8427 DOCREV CUR MEDS BY ELIG CLIN: HCPCS | Performed by: INTERNAL MEDICINE

## 2019-01-08 PROCEDURE — 2022F DILAT RTA XM EVC RTNOPTHY: CPT | Performed by: INTERNAL MEDICINE

## 2019-01-08 PROCEDURE — G8484 FLU IMMUNIZE NO ADMIN: HCPCS | Performed by: INTERNAL MEDICINE

## 2019-01-08 RX ORDER — CEPHALEXIN 500 MG/1
500 CAPSULE ORAL 4 TIMES DAILY
Qty: 30 CAPSULE | Refills: 0 | Status: SHIPPED | OUTPATIENT
Start: 2019-01-08 | End: 2019-05-13 | Stop reason: ALTCHOICE

## 2019-01-09 ENCOUNTER — HOSPITAL ENCOUNTER (OUTPATIENT)
Dept: PHYSICAL THERAPY | Age: 59
Setting detail: THERAPIES SERIES
Discharge: HOME | End: 2019-01-09
Payer: COMMERCIAL

## 2019-01-09 PROCEDURE — 97110 THERAPEUTIC EXERCISES: CPT

## 2019-01-09 PROCEDURE — 97140 MANUAL THERAPY 1/> REGIONS: CPT

## 2019-01-14 ENCOUNTER — HOSPITAL ENCOUNTER (OUTPATIENT)
Dept: PHYSICAL THERAPY | Age: 59
Setting detail: THERAPIES SERIES
Discharge: HOME | End: 2019-01-14
Payer: COMMERCIAL

## 2019-01-14 PROCEDURE — 97110 THERAPEUTIC EXERCISES: CPT

## 2019-01-14 PROCEDURE — 97140 MANUAL THERAPY 1/> REGIONS: CPT

## 2019-01-16 ENCOUNTER — HOSPITAL ENCOUNTER (OUTPATIENT)
Dept: PHYSICAL THERAPY | Age: 59
Setting detail: THERAPIES SERIES
Discharge: HOME | End: 2019-01-16
Payer: COMMERCIAL

## 2019-01-16 PROCEDURE — 97140 MANUAL THERAPY 1/> REGIONS: CPT

## 2019-01-16 PROCEDURE — 97110 THERAPEUTIC EXERCISES: CPT

## 2019-01-16 ASSESSMENT — PAIN SCALES - GENERAL: PAINLEVEL_OUTOF10: 1

## 2019-01-16 ASSESSMENT — PAIN DESCRIPTION - DESCRIPTORS: DESCRIPTORS: SORE

## 2019-01-16 ASSESSMENT — PAIN DESCRIPTION - LOCATION: LOCATION: SHOULDER

## 2019-01-16 ASSESSMENT — PAIN DESCRIPTION - PAIN TYPE: TYPE: CHRONIC PAIN

## 2019-01-25 ENCOUNTER — HOSPITAL ENCOUNTER (OUTPATIENT)
Dept: PHYSICAL THERAPY | Age: 59
Setting detail: THERAPIES SERIES
Discharge: HOME | End: 2019-01-25
Payer: COMMERCIAL

## 2019-01-25 PROCEDURE — 97110 THERAPEUTIC EXERCISES: CPT

## 2019-02-07 RX ORDER — LOSARTAN POTASSIUM AND HYDROCHLOROTHIAZIDE 25; 100 MG/1; MG/1
TABLET ORAL
Qty: 90 TABLET | Refills: 0 | Status: SHIPPED | OUTPATIENT
Start: 2019-02-07 | End: 2019-04-29 | Stop reason: SDUPTHER

## 2019-02-08 ENCOUNTER — OFFICE VISIT (OUTPATIENT)
Dept: ENDOCRINOLOGY | Age: 59
End: 2019-02-08
Payer: COMMERCIAL

## 2019-02-08 VITALS
HEIGHT: 74 IN | OXYGEN SATURATION: 93 % | HEART RATE: 71 BPM | DIASTOLIC BLOOD PRESSURE: 82 MMHG | BODY MASS INDEX: 40.43 KG/M2 | WEIGHT: 315 LBS | SYSTOLIC BLOOD PRESSURE: 157 MMHG

## 2019-02-08 DIAGNOSIS — E11.65 UNCONTROLLED TYPE 2 DIABETES MELLITUS WITH HYPERGLYCEMIA (HCC): Primary | ICD-10-CM

## 2019-02-08 PROCEDURE — G8427 DOCREV CUR MEDS BY ELIG CLIN: HCPCS | Performed by: INTERNAL MEDICINE

## 2019-02-08 PROCEDURE — 3017F COLORECTAL CA SCREEN DOC REV: CPT | Performed by: INTERNAL MEDICINE

## 2019-02-08 PROCEDURE — 3046F HEMOGLOBIN A1C LEVEL >9.0%: CPT | Performed by: INTERNAL MEDICINE

## 2019-02-08 PROCEDURE — 2022F DILAT RTA XM EVC RTNOPTHY: CPT | Performed by: INTERNAL MEDICINE

## 2019-02-08 PROCEDURE — 99213 OFFICE O/P EST LOW 20 MIN: CPT | Performed by: INTERNAL MEDICINE

## 2019-02-08 PROCEDURE — G8417 CALC BMI ABV UP PARAM F/U: HCPCS | Performed by: INTERNAL MEDICINE

## 2019-02-08 PROCEDURE — G8484 FLU IMMUNIZE NO ADMIN: HCPCS | Performed by: INTERNAL MEDICINE

## 2019-02-08 PROCEDURE — G8598 ASA/ANTIPLAT THER USED: HCPCS | Performed by: INTERNAL MEDICINE

## 2019-02-08 PROCEDURE — 1036F TOBACCO NON-USER: CPT | Performed by: INTERNAL MEDICINE

## 2019-02-18 RX ORDER — INSULIN GLARGINE 100 [IU]/ML
INJECTION, SOLUTION SUBCUTANEOUS
Qty: 30 ML | Refills: 3 | Status: SHIPPED | OUTPATIENT
Start: 2019-02-18 | End: 2019-05-16 | Stop reason: SDUPTHER

## 2019-02-25 RX ORDER — METFORMIN HYDROCHLORIDE 500 MG/1
TABLET, EXTENDED RELEASE ORAL
Qty: 120 TABLET | Refills: 3 | Status: SHIPPED | OUTPATIENT
Start: 2019-02-25 | End: 2019-06-19 | Stop reason: SDUPTHER

## 2019-02-28 ENCOUNTER — APPOINTMENT (OUTPATIENT)
Dept: GENERAL RADIOLOGY | Age: 59
End: 2019-02-28
Payer: COMMERCIAL

## 2019-02-28 ENCOUNTER — HOSPITAL ENCOUNTER (EMERGENCY)
Age: 59
Discharge: HOME | End: 2019-02-28
Payer: COMMERCIAL

## 2019-02-28 VITALS
SYSTOLIC BLOOD PRESSURE: 124 MMHG | WEIGHT: 315 LBS | BODY MASS INDEX: 39.17 KG/M2 | RESPIRATION RATE: 18 BRPM | DIASTOLIC BLOOD PRESSURE: 77 MMHG | TEMPERATURE: 97.8 F | HEIGHT: 75 IN | OXYGEN SATURATION: 95 % | HEART RATE: 80 BPM

## 2019-02-28 DIAGNOSIS — J06.9 ACUTE UPPER RESPIRATORY INFECTION: Primary | ICD-10-CM

## 2019-02-28 DIAGNOSIS — R07.89 ATYPICAL CHEST PAIN: ICD-10-CM

## 2019-02-28 LAB
ANION GAP SERPL CALCULATED.3IONS-SCNC: 13 MEQ/L (ref 9–15)
BASOPHILS ABSOLUTE: 0.1 K/UL (ref 0–0.2)
BASOPHILS RELATIVE PERCENT: 0.8 %
BUN BLDV-MCNC: 12 MG/DL (ref 6–20)
CALCIUM SERPL-MCNC: 9.4 MG/DL (ref 8.5–9.9)
CHLORIDE BLD-SCNC: 99 MEQ/L (ref 95–107)
CO2: 27 MEQ/L (ref 20–31)
CREAT SERPL-MCNC: 0.67 MG/DL (ref 0.7–1.2)
EKG ATRIAL RATE: 77 BPM
EKG P AXIS: 61 DEGREES
EKG P-R INTERVAL: 152 MS
EKG Q-T INTERVAL: 380 MS
EKG QRS DURATION: 82 MS
EKG QTC CALCULATION (BAZETT): 430 MS
EKG R AXIS: 59 DEGREES
EKG T AXIS: 48 DEGREES
EKG VENTRICULAR RATE: 77 BPM
EOSINOPHILS ABSOLUTE: 0.4 K/UL (ref 0–0.7)
EOSINOPHILS RELATIVE PERCENT: 5.2 %
GFR AFRICAN AMERICAN: >60
GFR NON-AFRICAN AMERICAN: >60
GLUCOSE BLD-MCNC: 229 MG/DL (ref 70–99)
HCT VFR BLD CALC: 43.3 % (ref 42–52)
HEMOGLOBIN: 14.8 G/DL (ref 14–18)
LYMPHOCYTES ABSOLUTE: 1.6 K/UL (ref 1–4.8)
LYMPHOCYTES RELATIVE PERCENT: 20.2 %
MCH RBC QN AUTO: 26.6 PG (ref 27–31.3)
MCHC RBC AUTO-ENTMCNC: 34.1 % (ref 33–37)
MCV RBC AUTO: 78.1 FL (ref 80–100)
MONOCYTES ABSOLUTE: 0.6 K/UL (ref 0.2–0.8)
MONOCYTES RELATIVE PERCENT: 7.2 %
NEUTROPHILS ABSOLUTE: 5.2 K/UL (ref 1.4–6.5)
NEUTROPHILS RELATIVE PERCENT: 66.6 %
PDW BLD-RTO: 14.4 % (ref 11.5–14.5)
PLATELET # BLD: 191 K/UL (ref 130–400)
POTASSIUM SERPL-SCNC: 4 MEQ/L (ref 3.4–4.9)
RBC # BLD: 5.54 M/UL (ref 4.7–6.1)
SODIUM BLD-SCNC: 139 MEQ/L (ref 135–144)
TROPONIN: <0.01 NG/ML (ref 0–0.01)
WBC # BLD: 7.9 K/UL (ref 4.8–10.8)

## 2019-02-28 PROCEDURE — 93005 ELECTROCARDIOGRAM TRACING: CPT

## 2019-02-28 PROCEDURE — 84484 ASSAY OF TROPONIN QUANT: CPT

## 2019-02-28 PROCEDURE — 71045 X-RAY EXAM CHEST 1 VIEW: CPT

## 2019-02-28 PROCEDURE — 36415 COLL VENOUS BLD VENIPUNCTURE: CPT

## 2019-02-28 PROCEDURE — 85025 COMPLETE CBC W/AUTO DIFF WBC: CPT

## 2019-02-28 PROCEDURE — 99284 EMERGENCY DEPT VISIT MOD MDM: CPT

## 2019-02-28 PROCEDURE — 80048 BASIC METABOLIC PNL TOTAL CA: CPT

## 2019-02-28 RX ORDER — BENZONATATE 100 MG/1
100 CAPSULE ORAL 3 TIMES DAILY PRN
Qty: 20 CAPSULE | Refills: 0 | Status: SHIPPED | OUTPATIENT
Start: 2019-02-28 | End: 2019-03-05 | Stop reason: ALTCHOICE

## 2019-02-28 RX ORDER — CETIRIZINE HYDROCHLORIDE 10 MG/1
10 TABLET ORAL DAILY
Qty: 10 TABLET | Refills: 0 | Status: SHIPPED | OUTPATIENT
Start: 2019-02-28 | End: 2019-10-30

## 2019-02-28 ASSESSMENT — HEART SCORE: ECG: 0

## 2019-02-28 ASSESSMENT — ENCOUNTER SYMPTOMS
PHOTOPHOBIA: 0
BACK PAIN: 0
ABDOMINAL PAIN: 0
SORE THROAT: 0
NAUSEA: 0
COUGH: 1
EYE PAIN: 0
VOMITING: 0
DIARRHEA: 0
SHORTNESS OF BREATH: 0
RHINORRHEA: 1

## 2019-02-28 ASSESSMENT — PAIN DESCRIPTION - LOCATION: LOCATION: CHEST

## 2019-02-28 ASSESSMENT — PAIN SCALES - GENERAL: PAINLEVEL_OUTOF10: 3

## 2019-02-28 ASSESSMENT — PAIN DESCRIPTION - ORIENTATION: ORIENTATION: MID

## 2019-02-28 ASSESSMENT — PAIN DESCRIPTION - PAIN TYPE: TYPE: ACUTE PAIN

## 2019-03-01 PROCEDURE — 93010 ELECTROCARDIOGRAM REPORT: CPT | Performed by: INTERNAL MEDICINE

## 2019-03-05 ENCOUNTER — OFFICE VISIT (OUTPATIENT)
Dept: CARDIOLOGY CLINIC | Age: 59
End: 2019-03-05
Payer: COMMERCIAL

## 2019-03-05 VITALS
HEIGHT: 75 IN | DIASTOLIC BLOOD PRESSURE: 84 MMHG | HEART RATE: 86 BPM | SYSTOLIC BLOOD PRESSURE: 132 MMHG | RESPIRATION RATE: 22 BRPM | BODY MASS INDEX: 39.17 KG/M2 | WEIGHT: 315 LBS | OXYGEN SATURATION: 97 %

## 2019-03-05 DIAGNOSIS — I10 ESSENTIAL HYPERTENSION: ICD-10-CM

## 2019-03-05 DIAGNOSIS — Z95.0 S/P CARDIAC PACEMAKER PROCEDURE: ICD-10-CM

## 2019-03-05 DIAGNOSIS — I48.0 PAROXYSMAL ATRIAL FIBRILLATION (HCC): ICD-10-CM

## 2019-03-05 DIAGNOSIS — R07.89 OTHER CHEST PAIN: Primary | ICD-10-CM

## 2019-03-05 DIAGNOSIS — I25.2 HISTORY OF NON-ST ELEVATION MYOCARDIAL INFARCTION (NSTEMI): ICD-10-CM

## 2019-03-05 PROCEDURE — G8427 DOCREV CUR MEDS BY ELIG CLIN: HCPCS | Performed by: INTERNAL MEDICINE

## 2019-03-05 PROCEDURE — 99214 OFFICE O/P EST MOD 30 MIN: CPT | Performed by: INTERNAL MEDICINE

## 2019-03-05 PROCEDURE — G8417 CALC BMI ABV UP PARAM F/U: HCPCS | Performed by: INTERNAL MEDICINE

## 2019-03-05 PROCEDURE — 1036F TOBACCO NON-USER: CPT | Performed by: INTERNAL MEDICINE

## 2019-03-05 PROCEDURE — 3017F COLORECTAL CA SCREEN DOC REV: CPT | Performed by: INTERNAL MEDICINE

## 2019-03-05 PROCEDURE — G8484 FLU IMMUNIZE NO ADMIN: HCPCS | Performed by: INTERNAL MEDICINE

## 2019-03-05 PROCEDURE — G8598 ASA/ANTIPLAT THER USED: HCPCS | Performed by: INTERNAL MEDICINE

## 2019-03-05 RX ORDER — AZITHROMYCIN 250 MG/1
250 TABLET, FILM COATED ORAL SEE ADMIN INSTRUCTIONS
Qty: 6 TABLET | Refills: 0 | Status: SHIPPED | OUTPATIENT
Start: 2019-03-05 | End: 2019-03-10

## 2019-03-05 ASSESSMENT — ENCOUNTER SYMPTOMS
CHEST TIGHTNESS: 0
VOMITING: 0
DIARRHEA: 0
BLOOD IN STOOL: 0
APNEA: 0
NAUSEA: 0
SHORTNESS OF BREATH: 0

## 2019-03-15 ASSESSMENT — ENCOUNTER SYMPTOMS
ANAL BLEEDING: 0
COLOR CHANGE: 0
VOICE CHANGE: 0
WHEEZING: 0
FACIAL SWELLING: 0
ABDOMINAL DISTENTION: 0
TROUBLE SWALLOWING: 0

## 2019-04-22 ENCOUNTER — HOSPITAL ENCOUNTER (OUTPATIENT)
Dept: GENERAL RADIOLOGY | Age: 59
Discharge: HOME | End: 2019-04-24
Payer: COMMERCIAL

## 2019-04-22 ENCOUNTER — OFFICE VISIT (OUTPATIENT)
Dept: FAMILY MEDICINE CLINIC | Age: 59
End: 2019-04-22
Payer: COMMERCIAL

## 2019-04-22 VITALS
HEIGHT: 74 IN | OXYGEN SATURATION: 99 % | HEART RATE: 78 BPM | RESPIRATION RATE: 10 BRPM | BODY MASS INDEX: 40.43 KG/M2 | WEIGHT: 315 LBS | TEMPERATURE: 101.5 F | SYSTOLIC BLOOD PRESSURE: 110 MMHG | DIASTOLIC BLOOD PRESSURE: 70 MMHG

## 2019-04-22 DIAGNOSIS — R50.9 FEVER AND CHILLS: ICD-10-CM

## 2019-04-22 DIAGNOSIS — J20.9 ACUTE BRONCHITIS, UNSPECIFIED ORGANISM: Primary | ICD-10-CM

## 2019-04-22 LAB
INFLUENZA A ANTIBODY: NORMAL
INFLUENZA B ANTIBODY: NORMAL

## 2019-04-22 PROCEDURE — G8427 DOCREV CUR MEDS BY ELIG CLIN: HCPCS | Performed by: FAMILY MEDICINE

## 2019-04-22 PROCEDURE — 1036F TOBACCO NON-USER: CPT | Performed by: FAMILY MEDICINE

## 2019-04-22 PROCEDURE — 99213 OFFICE O/P EST LOW 20 MIN: CPT | Performed by: FAMILY MEDICINE

## 2019-04-22 PROCEDURE — G8417 CALC BMI ABV UP PARAM F/U: HCPCS | Performed by: FAMILY MEDICINE

## 2019-04-22 PROCEDURE — G8598 ASA/ANTIPLAT THER USED: HCPCS | Performed by: FAMILY MEDICINE

## 2019-04-22 PROCEDURE — 3017F COLORECTAL CA SCREEN DOC REV: CPT | Performed by: FAMILY MEDICINE

## 2019-04-22 PROCEDURE — 71046 X-RAY EXAM CHEST 2 VIEWS: CPT

## 2019-04-22 PROCEDURE — 87804 INFLUENZA ASSAY W/OPTIC: CPT | Performed by: FAMILY MEDICINE

## 2019-04-22 RX ORDER — LEVOFLOXACIN 500 MG/1
500 TABLET, FILM COATED ORAL DAILY
Qty: 10 TABLET | Refills: 0 | Status: SHIPPED | OUTPATIENT
Start: 2019-04-22 | End: 2019-05-02

## 2019-04-22 RX ORDER — LOSARTAN POTASSIUM AND HYDROCHLOROTHIAZIDE 12.5; 5 MG/1; MG/1
TABLET ORAL
COMMUNITY
Start: 2007-12-03 | End: 2019-07-15

## 2019-04-22 RX ORDER — PROMETHAZINE HYDROCHLORIDE AND CODEINE PHOSPHATE 6.25; 1 MG/5ML; MG/5ML
5 SYRUP ORAL 4 TIMES DAILY PRN
Qty: 100 ML | Refills: 0 | Status: SHIPPED | OUTPATIENT
Start: 2019-04-22 | End: 2019-04-27

## 2019-04-22 ASSESSMENT — ENCOUNTER SYMPTOMS
TROUBLE SWALLOWING: 0
BACK PAIN: 0
SHORTNESS OF BREATH: 0
NAUSEA: 1

## 2019-04-22 ASSESSMENT — PATIENT HEALTH QUESTIONNAIRE - PHQ9
1. LITTLE INTEREST OR PLEASURE IN DOING THINGS: 0
SUM OF ALL RESPONSES TO PHQ QUESTIONS 1-9: 0
2. FEELING DOWN, DEPRESSED OR HOPELESS: 0
SUM OF ALL RESPONSES TO PHQ QUESTIONS 1-9: 0
SUM OF ALL RESPONSES TO PHQ9 QUESTIONS 1 & 2: 0

## 2019-04-22 NOTE — PROGRESS NOTES
Attends meetings of clubs or organizations: None     Relationship status: None    Intimate partner violence:     Fear of current or ex partner: None     Emotionally abused: None     Physically abused: None     Forced sexual activity: None   Other Topics Concern    None   Social History Narrative    None     Current Outpatient Medications   Medication Sig Dispense Refill    losartan-hydrochlorothiazide (HYZAAR) 50-12.5 MG per tablet Take by mouth      metoprolol succinate (TOPROL XL) 100 MG extended release tablet TAKE ONE TABLET BY MOUTH EVERY DAY 90 tablet 3    cetirizine (ZYRTEC ALLERGY) 10 MG tablet Take 1 tablet by mouth daily 10 tablet 0    metFORMIN (GLUCOPHAGE-XR) 500 MG extended release tablet TAKE FOUR (4) TABLETS BY MOUTH EVERY  tablet 3    BASAGLAR KWIKPEN 100 UNIT/ML injection pen INJECT 80 UNITS SUBCUTANEOUSLY AT BEDTIME 30 mL 3    losartan-hydrochlorothiazide (HYZAAR) 100-25 MG per tablet TAKE 1 TABLET BY MOUTH  DAILY 90 tablet 0    cephALEXin (KEFLEX) 500 MG capsule Take 1 capsule by mouth 4 times daily 30 capsule 00    insulin lispro (HUMALOG KWIKPEN) 100 UNIT/ML pen 16 units at each meals 5 pen 3    Insulin Pen Needle (NOVOFINE) 32G X 6 MM MISC Qid 300 each 3    atorvastatin (LIPITOR) 80 MG tablet TAKE 1 TABLET BY MOUTH NIGHTLY  90 tablet 3    rivaroxaban (XARELTO) 20 MG TABS tablet Take 1 tablet by mouth daily (with breakfast) 30 tablet 11    isosorbide mononitrate (IMDUR) 30 MG extended release tablet TAKE ONE TABLET BY MOUTH EVERY DAY 90 tablet 3    spironolactone (ALDACTONE) 50 MG tablet TAKE ONE TABLET BY MOUTH EVERY DAY 90 tablet 3    glucose blood VI test strips (ONE TOUCH ULTRA TEST) strip Checks 3 times daily. IDDM--ICD-10 E11.9 300 strip 1    nitroGLYCERIN (NITROSTAT) 0.4 MG SL tablet up to max of 3 total doses. If no relief after 1 dose, call 911. 25 tablet 3    aspirin 81 MG tablet Take 81 mg by mouth daily.        No current facility-administered medications for this visit. 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6 kg)   SpO2 99%   BMI 43.76 kg/m²     Physical Exam  Heent: T.M normal bilat Mild pharyngeal injection. No exudate                Nares patent but swollen mucosa noted  Neck:  No anter cervical adenopathy. No masses. No thyroid asymmetry. Lungs: Clear equal breath sounds. No wheezes or rales. Heart: Rate reg No murmur  Gen: In no acute distress  Abdomen; B.S present. Soft. Non tender. No hepatosplenomegaly. No masses   Assessment:       Diagnosis Orders   1. Acute bronchitis, unspecified organism  promethazine-codeine (PHENERGAN WITH CODEINE) 6.25-10 MG/5ML syrup   2.  Fever and chills  POCT Influenza A/B    XR CHEST STANDARD (2 VW)         Plan:     Orders Placed This Encounter   Procedures    XR CHEST STANDARD (2 VW)     Standing Status:   Future     Standing Expiration Date:   4/22/2020    POCT Influenza A/B     Orders Placed This Encounter   Medications    levofloxacin (LEVAQUIN) 500 MG tablet     Sig: Take 1 tablet by mouth daily for 10 days     Dispense:  10 tablet     Refill:  0

## 2019-04-29 RX ORDER — LOSARTAN POTASSIUM AND HYDROCHLOROTHIAZIDE 25; 100 MG/1; MG/1
TABLET ORAL
Qty: 90 TABLET | Refills: 0 | Status: SHIPPED | OUTPATIENT
Start: 2019-04-29 | End: 2019-08-03 | Stop reason: SDUPTHER

## 2019-05-13 ENCOUNTER — OFFICE VISIT (OUTPATIENT)
Dept: FAMILY MEDICINE CLINIC | Age: 59
End: 2019-05-13
Payer: COMMERCIAL

## 2019-05-13 VITALS
OXYGEN SATURATION: 96 % | SYSTOLIC BLOOD PRESSURE: 136 MMHG | RESPIRATION RATE: 15 BRPM | TEMPERATURE: 97.4 F | DIASTOLIC BLOOD PRESSURE: 82 MMHG | BODY MASS INDEX: 39.17 KG/M2 | HEIGHT: 75 IN | WEIGHT: 315 LBS | HEART RATE: 88 BPM

## 2019-05-13 DIAGNOSIS — R05.9 COUGH: ICD-10-CM

## 2019-05-13 DIAGNOSIS — J01.90 ACUTE BACTERIAL SINUSITIS: Primary | ICD-10-CM

## 2019-05-13 DIAGNOSIS — B96.89 ACUTE BACTERIAL SINUSITIS: Primary | ICD-10-CM

## 2019-05-13 PROCEDURE — G8427 DOCREV CUR MEDS BY ELIG CLIN: HCPCS | Performed by: INTERNAL MEDICINE

## 2019-05-13 PROCEDURE — 99213 OFFICE O/P EST LOW 20 MIN: CPT | Performed by: INTERNAL MEDICINE

## 2019-05-13 PROCEDURE — 1036F TOBACCO NON-USER: CPT | Performed by: INTERNAL MEDICINE

## 2019-05-13 PROCEDURE — 3017F COLORECTAL CA SCREEN DOC REV: CPT | Performed by: INTERNAL MEDICINE

## 2019-05-13 PROCEDURE — G8598 ASA/ANTIPLAT THER USED: HCPCS | Performed by: INTERNAL MEDICINE

## 2019-05-13 PROCEDURE — G8417 CALC BMI ABV UP PARAM F/U: HCPCS | Performed by: INTERNAL MEDICINE

## 2019-05-13 RX ORDER — DEXTROMETHORPHAN HYDROBROMIDE AND PROMETHAZINE HYDROCHLORIDE 15; 6.25 MG/5ML; MG/5ML
5 SYRUP ORAL 4 TIMES DAILY PRN
Qty: 150 ML | Refills: 0 | Status: SHIPPED | OUTPATIENT
Start: 2019-05-13 | End: 2019-05-20

## 2019-05-13 RX ORDER — AMOXICILLIN AND CLAVULANATE POTASSIUM 875; 125 MG/1; MG/1
1 TABLET, FILM COATED ORAL 2 TIMES DAILY
Qty: 14 TABLET | Refills: 0 | Status: SHIPPED | OUTPATIENT
Start: 2019-05-13 | End: 2019-05-20

## 2019-05-13 ASSESSMENT — ENCOUNTER SYMPTOMS
EYE PAIN: 0
SINUS PRESSURE: 1
ABDOMINAL PAIN: 0
SHORTNESS OF BREATH: 0
BACK PAIN: 0
SINUS PAIN: 1

## 2019-05-13 NOTE — PROGRESS NOTES
Subjective:      Patient ID: Klaus Delarosa is a 66 y.o. male who presents today with:  Chief Complaint   Patient presents with    Cough     mentions that he was seen about a month ago with the same symptoms and they determined that it was viral     Sinus Problem     pressure and nasal drainage cuasing cough        HPI    Here for follow up. Was treated with levaquin for presumptative bronchitis in late April. He presented at time and symptoms improved. Last LHC was in 01/2018 and showed diffuse disease in the LcX, and RCA. Medical management was recommended. No chest pain. No exertional shortness of breath. Symptoms are similar to back in late April, coughing, sinus congestion and pressure. No fevers or chills. No known ic states. No known HIV. Worse after he gets up and sits up and he gets more sinus congestion and pain. No abdominal pain, no constipation. NO diarrhea. He's had these symptoms since last night. The symptoms aren't related to eating.    Past Medical History:   Diagnosis Date    Angina pectoris ([address] 10/12/2016    Atrial fibrillation ([facility]     Benign neoplasm of colon 01/19/2015    DR Vanessa Garcia    Benign neoplasm of rectum and anal canal 01/19/2015    DR Vanessa Garcia    CAD (coronary artery disease)     Diverticulosis of colon (without mention of hemorrhage) 01/19/2015    DR Vanessa Garcia    Dizziness 9/21/2016    Heart palpitations 10/12/2016    History of non-ST elevation myocardial infarction (NSTEMI) 12/30/2017    Hypertension     Obesity     Type 2 diabetes mellitus without complication (HCC)     Type II or unspecified type diabetes mellitus without mention of complication, not stated as uncontrolled      Past Surgical History:   Procedure Laterality Date    ATRIAL ABLATION SURGERY      CARDIAC SURGERY  12/31/13    DR HOFFMAN  Pacemaker    CATARACT REMOVAL      bilateral    COLONOSCOPY  01/19/2015    DR SOARES - POLYPS, DIVERTICULOSIS    EYE SURGERY      [place] cataract ext with  IOL implant  OTHER SURGICAL HISTORY  2013    TANSVENOUS PACER LEAD PLACEMENT     PACEMAKER INSERTION      PACEMAKER PLACEMENT      HI ARTHROSCOPY SHOULDER SURGICAL BICEPS TENODESIS Right 10/26/2018    RIGHT  SHOULDER ARTHROSCOPY SUBACROMIAL DECOMPRESSION performed by Vish Wong MD at [address] [place] [address] History     Socioeconomic History    Marital status: Single     Spouse name: Not on file    Number of children: Not on file    Years of education: Not on file    Highest education level: Not on file   Occupational History    Not on file   Social Needs    Financial resource strain: Not on file    Food insecurity:     Worry: Not on file     Inability: Not on file    Transportation needs:     Medical: Not on file     Non-medical: Not on file   Tobacco Use    Smoking status: Former Smoker     Packs/day: 1.00     Years: 15.00     Pack years: 15.00     Last attempt to quit: 1998     Years since quittin.3    Smokeless tobacco: Never Used    Tobacco comment: QUIT    Substance and Sexual Activity    Alcohol use: Yes     Comment: OCCASIONAL - not for past 2 yrs    Drug use: No    Sexual activity: Yes     Partners: Female   Lifestyle    Physical activity:     Days per week: Not on file     Minutes per session: Not on file    Stress: Not on file   Relationships    Social connections:     Talks on phone: Not on file     Gets together: Not on file     Attends [employer] service: Not on file     Active member of club or organization: Not on file     Attends meetings of clubs or organizations: Not on file     Relationship status: Not on file    Intimate partner violence:     Fear of current or ex partner: Not on file     Emotionally abused: Not on file     Physically abused: Not on file     Forced sexual activity: Not on file   Other Topics Concern    Not on file   Social History Narrative    Not on file     Allergies   Allergen Reactions    Actos [Pioglitazone]     Byetta 10 Mcg Pen Gastrointestinal: Negative for abdominal pain. Genitourinary: Negative for hematuria. Musculoskeletal: Negative for back pain. Allergic/Immunologic: Negative for immunocompromised state. Neurological: Negative for headaches. Psychiatric/Behavioral: Negative for hallucinations. Objective:   /82 (Site: Left Upper Arm, Position: Sitting, Cuff Size: Large Adult)   Pulse 88   Temp 97.4 °F (36.3 °C) (Tympanic)   Resp 15   Ht 6' 3\" (1.905 m)   Wt (!) 341 lb (154.7 kg)   SpO2 96%   BMI 42.62 kg/m²     Physical Exam   Constitutional: He is oriented to person, place, and time. He appears well-developed and well-nourished. HENT:   Head: Normocephalic. Eyes: Pupils are equal, round, and reactive to light. Neck: No tracheal deviation present. Cardiovascular: Normal rate, regular rhythm and normal heart sounds. Exam reveals no gallop and no friction rub. No murmur heard. Pulmonary/Chest: No respiratory distress. Abdominal: Soft. Bowel sounds are normal. He exhibits no distension. There is no rebound. Musculoskeletal: He exhibits no edema. Neurological: He is oriented to person, place, and time. Skin: Skin is warm and dry. Assessment:       Diagnosis Orders   1. Acute bacterial sinusitis           Plan:   Since this is the second time he has had this. His symptoms are predominately in his sinuses  No focal abdominal pain. Start augmentin  If his symptoms don't improve get cxr  If any cp, dyspnea, nausea, go to ER given his heart history  He has cardiology follow up planned. The patient was reminded that an antibiotic has been prescribed the predicted course is improvement to cure with no persistent issues. Take antibiotics as directed. If any problems occur, an appointment should be made or ER visit if severe. Because of the risk with ANY antibiotic of C. Difficile colitis if persistent diarrhea or abdominal pain or any concerning symptoms, we should be notified.

## 2019-05-16 ENCOUNTER — OFFICE VISIT (OUTPATIENT)
Dept: ENDOCRINOLOGY | Age: 59
End: 2019-05-16
Payer: COMMERCIAL

## 2019-05-16 VITALS
HEART RATE: 86 BPM | HEIGHT: 75 IN | SYSTOLIC BLOOD PRESSURE: 135 MMHG | WEIGHT: 315 LBS | BODY MASS INDEX: 39.17 KG/M2 | DIASTOLIC BLOOD PRESSURE: 86 MMHG

## 2019-05-16 DIAGNOSIS — E11.8 TYPE 2 DIABETES MELLITUS WITH COMPLICATION, UNSPECIFIED WHETHER LONG TERM INSULIN USE: Primary | ICD-10-CM

## 2019-05-16 LAB
CHP ED QC CHECK: NORMAL
GLUCOSE BLD-MCNC: 156 MG/DL
HBA1C MFR BLD: 11.9 %

## 2019-05-16 PROCEDURE — 82962 GLUCOSE BLOOD TEST: CPT | Performed by: INTERNAL MEDICINE

## 2019-05-16 PROCEDURE — 3017F COLORECTAL CA SCREEN DOC REV: CPT | Performed by: INTERNAL MEDICINE

## 2019-05-16 PROCEDURE — 3046F HEMOGLOBIN A1C LEVEL >9.0%: CPT | Performed by: INTERNAL MEDICINE

## 2019-05-16 PROCEDURE — 83036 HEMOGLOBIN GLYCOSYLATED A1C: CPT | Performed by: INTERNAL MEDICINE

## 2019-05-16 PROCEDURE — 2022F DILAT RTA XM EVC RTNOPTHY: CPT | Performed by: INTERNAL MEDICINE

## 2019-05-16 PROCEDURE — 1036F TOBACCO NON-USER: CPT | Performed by: INTERNAL MEDICINE

## 2019-05-16 PROCEDURE — G8598 ASA/ANTIPLAT THER USED: HCPCS | Performed by: INTERNAL MEDICINE

## 2019-05-16 PROCEDURE — G8427 DOCREV CUR MEDS BY ELIG CLIN: HCPCS | Performed by: INTERNAL MEDICINE

## 2019-05-16 PROCEDURE — G8417 CALC BMI ABV UP PARAM F/U: HCPCS | Performed by: INTERNAL MEDICINE

## 2019-05-16 PROCEDURE — 99213 OFFICE O/P EST LOW 20 MIN: CPT | Performed by: INTERNAL MEDICINE

## 2019-05-17 NOTE — PROGRESS NOTES
Subjective:      Patient ID: Kavita Davis is a 66 y.o. male. 3 month f/u on diabetes   Diabetes   He presents for his follow-up diabetic visit. He has type 2 diabetes mellitus. Symptoms are worsening. Diabetic complications include heart disease. Current diabetic treatment includes insulin injections (basaglar humalog plus metformin ). He is currently taking insulin pre-breakfast, pre-lunch, pre-dinner and at bedtime. (Lab Results       Component                Value               Date                       LABA1C                   11.9                05/16/2019              Reviewed logs aveage glucose 250  No hypoglycemia testing 3 times a day )     Morbid obesity Body mass index is 41.87 kg/m². Results for Jimmie Nelson (MRN [id number]) as of 5/17/2019 07:53   Ref.  Range 3/21/2018 14:44 5/23/2018 11:25 8/22/2018 14:21 1/8/2019 17:09 5/16/2019 16:58   Hemoglobin A1C Latest Units: % 11.1 (H) 11.1 (H) 10.4 12.5 11.9       Patient Active Problem List   Diagnosis    Hypertension    [facility]diabetes mellitus) [facility]    BMI 40.0-44.9, adult ([facility]    History of tobacco abuse    S/P cardiac pacemaker procedure    Abnormal nuclear stress test    Phlebitis    CAD (coronary artery disease)    Dizziness    Heart palpitations    History of non-ST elevation myocardial infarction (NSTEMI)    A-fib (HCC)    SVT [facility]ra[facility]ricular tachycardia) [facility]     Allergies   Allergen Reactions    Actos [Pioglitazone]     Byetta 10 Mcg Pen [Exenatide]      Nausea        Current Outpatient Medications:     insulin glargine (BASAGLAR KWIKPEN) 100 UNIT/ML injection pen, INJECT 120 UNITS SUBCUTANEOUSLY AT BEDTIME, Disp: 30 pen, Rfl: 3    insulin lispro (HUMALOG KWIKPEN) 100 UNIT/ML pen, 24 units at each meals, Disp: 5 pen, Rfl: 3    amoxicillin-clavulanate (AUGMENTIN) 875-125 MG per tablet, Take 1 tablet by mouth 2 times daily for 7 days, Disp: 14 tablet, Rfl: 0    promethazine-dextromethorphan (PROMETHAZINE-DM) 6.25-15 MG/5ML syrup, Take 5 mLs by mouth 4 times daily as needed for Cough, Disp: 150 mL, Rfl: 0    losartan-hydrochlorothiazide (HYZAAR) 100-25 MG per tablet, TAKE 1 TABLET BY MOUTH  DAILY, Disp: 90 tablet, Rfl: 0    losartan-hydrochlorothiazide (HYZAAR) 50-12.5 MG per tablet, Take by mouth, Disp: , Rfl:     metoprolol succinate (TOPROL XL) 100 MG extended release tablet, TAKE ONE TABLET BY MOUTH EVERY DAY, Disp: 90 tablet, Rfl: 3    cetirizine (ZYRTEC ALLERGY) 10 MG tablet, Take 1 tablet by mouth daily, Disp: 10 tablet, Rfl: 0    metFORMIN (GLUCOPHAGE-XR) 500 MG extended release tablet, TAKE FOUR (4) TABLETS BY MOUTH EVERY DAY, Disp: 120 tablet, Rfl: 3    Insulin Pen Needle (NOVOFINE) 32G X 6 MM MISC, Qid, Disp: 300 each, Rfl: 3    atorvastatin (LIPITOR) 80 MG tablet, TAKE 1 TABLET BY MOUTH NIGHTLY , Disp: 90 tablet, Rfl: 3    rivaroxaban (XARELTO) 20 MG TABS tablet, Take 1 tablet by mouth daily (with breakfast), Disp: 30 tablet, Rfl: 11    isosorbide mononitrate (IMDUR) 30 MG extended release tablet, TAKE ONE TABLET BY MOUTH EVERY DAY, Disp: 90 tablet, Rfl: 3    spironolactone (ALDACTONE) 50 MG tablet, TAKE ONE TABLET BY MOUTH EVERY DAY, Disp: 90 tablet, Rfl: 3    glucose blood VI test strips (ONE TOUCH ULTRA TEST) strip, Checks 3 times daily. IDDM--ICD-10 E11.9, Disp: 300 strip, Rfl: 1    nitroGLYCERIN (NITROSTAT) 0.4 MG SL tablet, up to max of 3 total doses. If no relief after 1 dose, call 911., Disp: 25 tablet, Rfl: 3    aspirin 81 MG tablet, Take 81 mg by mouth daily. , Disp: , Rfl:     Review of Systems      Vitals:    05/16/19 1648   BP: 135/86   Site: Right Upper Arm   Position: Sitting   Cuff Size: Large Adult   Pulse: 86   Weight: (!) 335 lb (152 kg)   Height: 6' 3\" (1.905 m)       Objective:   Physical Exam   Constitutional: He appears well-developed and well-nourished. HENT:   Head: Normocephalic and atraumatic. Cardiovascular: Normal rate. Musculoskeletal: Normal range of motion.    Neurological: He is alert. Psychiatric: He has a normal mood and affect. Assessment:       Diagnosis Orders   1.  Type 2 diabetes mellitus with complication, unspecified whether long term insulin use (HCC)  POCT Glucose    POCT glycosylated hemoglobin (Hb A1C)           Plan:      Orders Placed This Encounter   Procedures    POCT Glucose    POCT glycosylated hemoglobin (Hb A1C)     Increase insulin dose   Orders Placed This Encounter   Medications    insulin glargine (BASAGLAR KWIKPEN) 100 UNIT/ML injection pen     Sig: INJECT 120 UNITS SUBCUTANEOUSLY AT BEDTIME     Dispense:  30 pen     Refill:  3    insulin lispro (HUMALOG KWIKPEN) 100 UNIT/ML pen     Si units at each meals     Dispense:  5 pen     Refill:  3               Clive Petersen MD

## 2019-05-25 ENCOUNTER — OFFICE VISIT (OUTPATIENT)
Dept: FAMILY MEDICINE CLINIC | Age: 59
End: 2019-05-25
Payer: COMMERCIAL

## 2019-05-25 VITALS
BODY MASS INDEX: 42.5 KG/M2 | HEART RATE: 83 BPM | DIASTOLIC BLOOD PRESSURE: 68 MMHG | SYSTOLIC BLOOD PRESSURE: 110 MMHG | OXYGEN SATURATION: 98 % | TEMPERATURE: 98.3 F | WEIGHT: 315 LBS

## 2019-05-25 DIAGNOSIS — J03.00 STREPTOCOCCAL TONSILLITIS: Primary | ICD-10-CM

## 2019-05-25 PROCEDURE — G8598 ASA/ANTIPLAT THER USED: HCPCS | Performed by: NURSE PRACTITIONER

## 2019-05-25 PROCEDURE — G8417 CALC BMI ABV UP PARAM F/U: HCPCS | Performed by: NURSE PRACTITIONER

## 2019-05-25 PROCEDURE — 1036F TOBACCO NON-USER: CPT | Performed by: NURSE PRACTITIONER

## 2019-05-25 PROCEDURE — 3017F COLORECTAL CA SCREEN DOC REV: CPT | Performed by: NURSE PRACTITIONER

## 2019-05-25 PROCEDURE — G8427 DOCREV CUR MEDS BY ELIG CLIN: HCPCS | Performed by: NURSE PRACTITIONER

## 2019-05-25 PROCEDURE — 99213 OFFICE O/P EST LOW 20 MIN: CPT | Performed by: NURSE PRACTITIONER

## 2019-05-25 RX ORDER — METHYLPREDNISOLONE 4 MG/1
TABLET ORAL
Qty: 1 KIT | Refills: 0 | Status: SHIPPED | OUTPATIENT
Start: 2019-05-25 | End: 2019-10-30

## 2019-05-25 RX ORDER — AMOXICILLIN AND CLAVULANATE POTASSIUM 875; 125 MG/1; MG/1
1 TABLET, FILM COATED ORAL 2 TIMES DAILY
Qty: 20 TABLET | Refills: 0 | Status: SHIPPED | OUTPATIENT
Start: 2019-05-25 | End: 2019-06-04

## 2019-05-25 ASSESSMENT — ENCOUNTER SYMPTOMS
CHANGE IN BOWEL HABIT: 0
TROUBLE SWALLOWING: 1
SORE THROAT: 1
SINUS PRESSURE: 0
VISUAL CHANGE: 0
ABDOMINAL PAIN: 0
WHEEZING: 0
SHORTNESS OF BREATH: 0
COUGH: 0
VOMITING: 0
NAUSEA: 0
SWOLLEN GLANDS: 1
RHINORRHEA: 0
EYES NEGATIVE: 1

## 2019-05-25 NOTE — PROGRESS NOTES
 losartan-hydrochlorothiazide (HYZAAR) 50-12.5 MG per tablet Take by mouth      metoprolol succinate (TOPROL XL) 100 MG extended release tablet TAKE ONE TABLET BY MOUTH EVERY DAY 90 tablet 3    cetirizine (ZYRTEC ALLERGY) 10 MG tablet Take 1 tablet by mouth daily 10 tablet 0    metFORMIN (GLUCOPHAGE-XR) 500 MG extended release tablet TAKE FOUR (4) TABLETS BY MOUTH EVERY  tablet 3    Insulin Pen Needle (NOVOFINE) 32G X 6 MM MISC Qid 300 each 3    atorvastatin (LIPITOR) 80 MG tablet TAKE 1 TABLET BY MOUTH NIGHTLY  90 tablet 3    rivaroxaban (XARELTO) 20 MG TABS tablet Take 1 tablet by mouth daily (with breakfast) 30 tablet 11    isosorbide mononitrate (IMDUR) 30 MG extended release tablet TAKE ONE TABLET BY MOUTH EVERY DAY 90 tablet 3    spironolactone (ALDACTONE) 50 MG tablet TAKE ONE TABLET BY MOUTH EVERY DAY 90 tablet 3    glucose blood VI test strips (ONE TOUCH ULTRA TEST) strip Checks 3 times daily. IDDM--ICD-10 E11.9 300 strip 1    aspirin 81 MG tablet Take 81 mg by mouth daily.  nitroGLYCERIN (NITROSTAT) 0.4 MG SL tablet up to max of 3 total doses. If no relief after 1 dose, call 911. 25 tablet 3     No current facility-administered medications on file prior to visit.       Past Surgical History:   Procedure Laterality Date    ATRIAL ABLATION SURGERY      CARDIAC SURGERY  12/31/13    DR HOFFMAN  Pacemaker    CATARACT REMOVAL      bilateral    COLONOSCOPY  01/19/2015    DR Leonor Shields - POLYPS, DIVERTICULOSIS    EYE SURGERY      [place] cataract ext with  IOL implant    OTHER SURGICAL HISTORY  12/30/2013    TANSVENOUS PACER LEAD PLACEMENT     PACEMAKER INSERTION      PACEMAKER PLACEMENT      OK ARTHROSCOPY SHOULDER SURGICAL BICEPS TENODESIS Right 10/26/2018    RIGHT  SHOULDER ARTHROSCOPY SUBACROMIAL DECOMPRESSION performed [facility]ish [facility]        Family History   Problem Relation Age of Onset    Cancer Mother         LUNG    Diabetes Father      Social History Socioeconomic History    Marital status: Single     Spouse name: Not on file    Number of children: Not on file    Years of education: Not on file    Highest education level: Not on file   Occupational History    Not on file   Social Needs    Financial resource strain: Not on file    Food insecurity:     Worry: Not on file     Inability: Not on file    Transportation needs:     Medical: Not on file     Non-medical: Not on file   Tobacco Use    Smoking status: Former Smoker     Packs/day: 1.00     Years: 15.00     Pack years: 15.00     Last attempt to quit: 1998     Years since quittin.4    Smokeless tobacco: Never Used    Tobacco comment: QUIT    Substance and Sexual Activity    Alcohol use: Yes     Comment: OCCASIONAL - not for past 2 yrs    Drug use: No    Sexual activity: Yes     Partners: Female   Lifestyle    Physical activity:     Days per week: Not on file     Minutes per session: Not on file    Stress: Not on file   Relationships    Social connections:     Talks on phone: Not on file     Gets together: Not on file     Attends Sabianism service: Not on file     Active member of club or organization: Not on file     Attends meetings of clubs or organizations: Not on file     Relationship status: Not on file    Intimate partner violence:     Fear of current or ex partner: Not on file     Emotionally abused: Not on file     Physically abused: Not on file     Forced sexual activity: Not on file   Other Topics Concern    Not on file   Social History Narrative    Not on file     Allergies:  Actos [pioglitazone] and Byetta 10 mcg pen [exenatide]    Review of Systems   Constitutional: Negative for chills, diaphoresis, fatigue and fever. HENT: Positive for sore throat and trouble swallowing (due to pain). Negative for congestion, ear pain, postnasal drip, rhinorrhea and sinus pressure. Eyes: Negative. Negative for visual disturbance.    Respiratory: Negative for cough, shortness of breath and wheezing. Cardiovascular: Negative for chest pain. Gastrointestinal: Negative for abdominal pain, anorexia, change in bowel habit, nausea and vomiting. Musculoskeletal: Negative for arthralgias, joint swelling, myalgias and neck pain. Skin: Negative for rash. Neurological: Positive for headaches. Negative for dizziness, vertigo, weakness, light-headedness and numbness. Hematological: Positive for adenopathy. Objective:    /68 (Site: Right Upper Arm, Position: Sitting, Cuff Size: Large Adult)   Pulse 83   Temp 98.3 °F (36.8 °C) (Temporal)   Wt (!) 340 lb (154.2 kg)   SpO2 98%   BMI 42.50 kg/m²     Physical Exam   Constitutional: He is oriented to person, place, and time. He appears well-developed and well-nourished. No distress. HENT:   Head: Normocephalic and atraumatic. Right Ear: Tympanic membrane, external ear and ear canal normal.   Left Ear: Tympanic membrane, external ear and ear canal normal.   Nose: Nose normal.   Mouth/Throat: Posterior oropharyngeal edema and posterior oropharyngeal erythema present. No oropharyngeal exudate. Tonsils are 3+ on the right. Tonsils are 3+ on the left. Tonsillar exudate. Eyes: Conjunctivae are normal.   Neck: Normal range of motion. Neck supple. Cardiovascular: Normal rate, regular rhythm and normal heart sounds. Pulmonary/Chest: Effort normal and breath sounds normal. No respiratory distress. He has no wheezes. Musculoskeletal: Normal range of motion. Lymphadenopathy:     He has cervical adenopathy. Neurological: He is alert and oriented to person, place, and time. Skin: Skin is warm and dry. No rash noted. He is not diaphoretic. Assessment & Plan:       Diagnosis Orders   1. Streptococcal tonsillitis  amoxicillin-clavulanate (AUGMENTIN) 875-125 MG per tablet    methylPREDNISolone (MEDROL, SAMIRA,) 4 MG tablet     No orders of the defined types were placed in this encounter.     Orders Placed This Encounter   Medications    amoxicillin-clavulanate (AUGMENTIN) 875-125 MG per tablet     Sig: Take 1 tablet by mouth 2 times daily for 10 days     Dispense:  20 tablet     Refill:  0    methylPREDNISolone (MEDROL, SAMIRA,) 4 MG tablet     Sig: Take by mouth. Dispense:  1 kit     Refill:  0     There are no discontinued medications. Return if symptoms worsen or fail to improve, for PCP follow-up. Reviewed with the patient: currentclinical status, medications, activities and diet. Side effects, adverse effects of the medicationprescribed today, as well as treatment plan/ rationale and result expectations havebeen discussed with the patient who expresses understanding and desires to proceed. Pt instructions reviewed and given to patient.     Close follow up to evaluate treatment resultsand for coordination of care. I have reviewed the patient's medical historyin detail and updated the computerized patient record.     Barbie Segundo, ERIC - CNP

## 2019-06-19 RX ORDER — METFORMIN HYDROCHLORIDE 500 MG/1
TABLET, EXTENDED RELEASE ORAL
Qty: 120 TABLET | Refills: 2 | Status: SHIPPED | OUTPATIENT
Start: 2019-06-19 | End: 2019-09-13 | Stop reason: SDUPTHER

## 2019-07-03 DIAGNOSIS — I10 ESSENTIAL HYPERTENSION: ICD-10-CM

## 2019-07-03 RX ORDER — SPIRONOLACTONE 50 MG/1
TABLET, FILM COATED ORAL
Qty: 90 TABLET | Refills: 3 | Status: SHIPPED | OUTPATIENT
Start: 2019-07-03 | End: 2020-06-15

## 2019-07-15 ENCOUNTER — OFFICE VISIT (OUTPATIENT)
Dept: FAMILY MEDICINE CLINIC | Age: 59
End: 2019-07-15
Payer: COMMERCIAL

## 2019-07-15 VITALS
HEART RATE: 78 BPM | WEIGHT: 315 LBS | BODY MASS INDEX: 40.43 KG/M2 | TEMPERATURE: 97.3 F | DIASTOLIC BLOOD PRESSURE: 68 MMHG | RESPIRATION RATE: 12 BRPM | OXYGEN SATURATION: 98 % | SYSTOLIC BLOOD PRESSURE: 138 MMHG | HEIGHT: 74 IN

## 2019-07-15 DIAGNOSIS — E66.01 MORBID OBESITY WITH BMI OF 40.0-44.9, ADULT (HCC): ICD-10-CM

## 2019-07-15 DIAGNOSIS — I10 ESSENTIAL HYPERTENSION: ICD-10-CM

## 2019-07-15 DIAGNOSIS — Z00.00 HEALTH MAINTENANCE EXAMINATION: Primary | ICD-10-CM

## 2019-07-15 DIAGNOSIS — I47.1 SVT (SUPRAVENTRICULAR TACHYCARDIA) (HCC): ICD-10-CM

## 2019-07-15 DIAGNOSIS — I48.0 PAROXYSMAL ATRIAL FIBRILLATION (HCC): ICD-10-CM

## 2019-07-15 DIAGNOSIS — I25.119 CORONARY ARTERY DISEASE INVOLVING NATIVE CORONARY ARTERY OF NATIVE HEART WITH ANGINA PECTORIS (HCC): ICD-10-CM

## 2019-07-15 DIAGNOSIS — Z12.5 SPECIAL SCREENING FOR MALIGNANT NEOPLASM OF PROSTATE: ICD-10-CM

## 2019-07-15 DIAGNOSIS — E78.2 MIXED HYPERLIPIDEMIA: ICD-10-CM

## 2019-07-15 DIAGNOSIS — E11.8 TYPE 2 DIABETES MELLITUS WITH COMPLICATION, UNSPECIFIED WHETHER LONG TERM INSULIN USE: ICD-10-CM

## 2019-07-15 LAB
BILIRUBIN, POC: NORMAL
BLOOD URINE, POC: NORMAL
CLARITY, POC: CLEAR
COLOR, POC: YELLOW
GLUCOSE URINE, POC: NORMAL
KETONES, POC: NORMAL
LEUKOCYTE EST, POC: NORMAL
NITRITE, POC: NORMAL
PH, POC: 6
PROTEIN, POC: NORMAL
SPECIFIC GRAVITY, POC: NORMAL
UROBILINOGEN, POC: NORMAL

## 2019-07-15 PROCEDURE — 99396 PREV VISIT EST AGE 40-64: CPT | Performed by: FAMILY MEDICINE

## 2019-07-15 PROCEDURE — 81002 URINALYSIS NONAUTO W/O SCOPE: CPT | Performed by: FAMILY MEDICINE

## 2019-07-15 RX ORDER — LOSARTAN POTASSIUM AND HYDROCHLOROTHIAZIDE 12.5; 5 MG/1; MG/1
TABLET ORAL
COMMUNITY
Start: 2007-12-03 | End: 2019-07-15

## 2019-07-15 ASSESSMENT — ENCOUNTER SYMPTOMS
ABDOMINAL PAIN: 0
SHORTNESS OF BREATH: 0

## 2019-07-15 NOTE — PROGRESS NOTES
Subjective:      Patient ID: Sheila Issa is a 66 y.o. male    HPI  Here for physical for possible discount on health insurance. Does see cardiology and endocrine    Review of Systems   Constitutional: Negative for appetite change. Respiratory: Negative for shortness of breath. Cardiovascular: Negative for chest pain. Gastrointestinal: Negative for abdominal pain. Skin: Negative for rash. Neurological: Negative for dizziness and weakness.      Reviewed allergy, medical, social, surgical, family and med list changes and updated   Files     Social History     Socioeconomic History    Marital status: Single     Spouse name: None    Number of children: None    Years of education: None    Highest education level: None   Occupational History    None   Social Needs    Financial resource strain: None    Food insecurity:     Worry: None     Inability: None    Transportation needs:     Medical: None     Non-medical: None   Tobacco Use    Smoking status: Former Smoker     Packs/day: 1.00     Years: 15.00     Pack years: 15.00     Last attempt to quit: 1998     Years since quittin.5    Smokeless tobacco: Never Used    Tobacco comment: QUIT    Substance and Sexual Activity    Alcohol use: Yes     Comment: OCCASIONAL - not for past 2 yrs    Drug use: No    Sexual activity: Yes     Partners: Female   Lifestyle    Physical activity:     Days per week: None     Minutes per session: None    Stress: None   Relationships    Social connections:     Talks on phone: None     Gets together: None     Attends Mormonism service: None     Active member of club or organization: None     Attends meetings of clubs or organizations: None     Relationship status: None    Intimate partner violence:     Fear of current or ex partner: None     Emotionally abused: None     Physically abused: None     Forced sexual activity: None   Other Topics Concern    None   Social History Narrative    None     Current mention of hemorrhage) 01/19/2015    DR Radha Mccain    Dizziness 9/21/2016    Heart palpitations 10/12/2016    History of non-ST elevation myocardial infarction (NSTEMI) 12/30/2017    Hypertension     Obesity     Type 2 diabetes mellitus without complication (HCC)     Type II or unspecified type diabetes mellitus without mention of complication, not stated as uncontrolled      Objective:   /68   Pulse 78   Temp 97.3 °F (36.3 °C) (Tympanic)   Resp 12   Ht 6' 2\" (1.88 m)   Wt (!) 352 lb (159.7 kg)   SpO2 98%   BMI 45.19 kg/m²     Physical Exam      PHYSICAL EXAMINATION:  Vital signs as recorded. GENERAL:  Alert, oriented male, well nourished, well developed with no acute distress with normal affect. HEENT:   Pupils equal and reactive to light and accommodation. Nares negative. Pharynx without erythema or exudate. TMs are clear. Sclerae and conjunctiva are also clear. No masses visible. NECK:  supple without masses, no adenopathy or bruits noted. Thyroid without any enlargements or nodularity. HEART:  RRR without murmurs, rubs or gallops. LUNGS:  clear to auscultation with normal breath sounds. No acute distress noted. Negative for rhonchi or wheezes. ABDOMEN:  soft times 4, nontender. Bowel sounds positive times 4. No guarding or rebound or bruits. No hepatosplenomegaly. No masses. GENITAL EXAM:  normal penis with no abnormalities noted. No hernias or testicular masses appreciated. EXTREMITIES:    No edema noted. No joint deformity or swelling noted either. SKIN: so suspicious skin lesions or masses noted. NEURO: No focal deficits noted at all. RECTAL;  Prostate minimally enlarged. No nodules or masses. PULSES:  Radial 2+ and equal while P.T 1+ and equal.          Assessment:       Diagnosis Orders   1. Health maintenance examination  POCT Urinalysis no Micro   2. Essential hypertension  POCT Urinalysis no Micro   3. Mixed hyperlipidemia     4.  Special screening for malignant neoplasm of prostate     5. Coronary artery disease involving native coronary artery of native heart with angina pectoris ([facility]     6. Morbid obesity with BMI of 40.0-44.9, adult ([facility]     7. Type 2 diabetes mellitus with complication, unspecified whether long term insulin use ([facility]     8.  SVT (supraventricular tachycardia) (HCC)     9. Paroxysmal atrial fibrillation ([facility]           Plan:      Continue current meds   Orders Placed This Encounter   Procedures    POCT Urinalysis no Micro   fasting blood work soon and f/u after above

## 2019-07-16 DIAGNOSIS — I10 ESSENTIAL HYPERTENSION: ICD-10-CM

## 2019-07-16 DIAGNOSIS — E78.2 MIXED HYPERLIPIDEMIA: ICD-10-CM

## 2019-07-16 DIAGNOSIS — Z12.5 SPECIAL SCREENING FOR MALIGNANT NEOPLASM OF PROSTATE: ICD-10-CM

## 2019-07-16 LAB
ALBUMIN SERPL-MCNC: 4.3 G/DL (ref 3.5–4.6)
ALP BLD-CCNC: 75 U/L (ref 35–104)
ALT SERPL-CCNC: 25 U/L (ref 0–41)
ANION GAP SERPL CALCULATED.3IONS-SCNC: 17 MEQ/L (ref 9–15)
AST SERPL-CCNC: 17 U/L (ref 0–40)
BASOPHILS ABSOLUTE: 0.1 K/UL (ref 0–0.2)
BASOPHILS RELATIVE PERCENT: 0.8 %
BILIRUB SERPL-MCNC: 0.5 MG/DL (ref 0.2–0.7)
BUN BLDV-MCNC: 15 MG/DL (ref 6–20)
CALCIUM SERPL-MCNC: 9.7 MG/DL (ref 8.5–9.9)
CHLORIDE BLD-SCNC: 101 MEQ/L (ref 95–107)
CHOLESTEROL, TOTAL: 116 MG/DL (ref 0–199)
CO2: 24 MEQ/L (ref 20–31)
CREAT SERPL-MCNC: 0.79 MG/DL (ref 0.7–1.2)
EOSINOPHILS ABSOLUTE: 0.1 K/UL (ref 0–0.7)
EOSINOPHILS RELATIVE PERCENT: 1.3 %
GFR AFRICAN AMERICAN: >60
GFR NON-AFRICAN AMERICAN: >60
GLOBULIN: 3.3 G/DL (ref 2.3–3.5)
GLUCOSE BLD-MCNC: 162 MG/DL (ref 70–99)
HCT VFR BLD CALC: 42.2 % (ref 42–52)
HDLC SERPL-MCNC: 41 MG/DL (ref 40–59)
HEMOGLOBIN: 14.4 G/DL (ref 14–18)
LDL CHOLESTEROL CALCULATED: 54 MG/DL (ref 0–129)
LYMPHOCYTES ABSOLUTE: 1.4 K/UL (ref 1–4.8)
LYMPHOCYTES RELATIVE PERCENT: 12.9 %
MCH RBC QN AUTO: 26.6 PG (ref 27–31.3)
MCHC RBC AUTO-ENTMCNC: 34.1 % (ref 33–37)
MCV RBC AUTO: 78 FL (ref 80–100)
MONOCYTES ABSOLUTE: 0.7 K/UL (ref 0.2–0.8)
MONOCYTES RELATIVE PERCENT: 6.2 %
NEUTROPHILS ABSOLUTE: 8.8 K/UL (ref 1.4–6.5)
NEUTROPHILS RELATIVE PERCENT: 78.8 %
PDW BLD-RTO: 15.3 % (ref 11.5–14.5)
PLATELET # BLD: 222 K/UL (ref 130–400)
POTASSIUM SERPL-SCNC: 4.3 MEQ/L (ref 3.4–4.9)
PROSTATE SPECIFIC ANTIGEN: 0.49 NG/ML (ref 0–3.89)
RBC # BLD: 5.4 M/UL (ref 4.7–6.1)
SODIUM BLD-SCNC: 142 MEQ/L (ref 135–144)
TOTAL PROTEIN: 7.6 G/DL (ref 6.3–8)
TRIGL SERPL-MCNC: 107 MG/DL (ref 0–150)
WBC # BLD: 11.2 K/UL (ref 4.8–10.8)

## 2019-08-15 ENCOUNTER — OFFICE VISIT (OUTPATIENT)
Dept: ENDOCRINOLOGY | Age: 59
End: 2019-08-15
Payer: COMMERCIAL

## 2019-08-15 VITALS
DIASTOLIC BLOOD PRESSURE: 79 MMHG | WEIGHT: 315 LBS | BODY MASS INDEX: 40.43 KG/M2 | HEIGHT: 74 IN | SYSTOLIC BLOOD PRESSURE: 135 MMHG | HEART RATE: 77 BPM

## 2019-08-15 DIAGNOSIS — E66.01 MORBID OBESITY (HCC): ICD-10-CM

## 2019-08-15 DIAGNOSIS — E11.8 TYPE 2 DIABETES MELLITUS WITH COMPLICATION, UNSPECIFIED WHETHER LONG TERM INSULIN USE: ICD-10-CM

## 2019-08-15 DIAGNOSIS — E11.00 TYPE 2 DIABETES MELLITUS WITH HYPEROSMOLARITY WITHOUT COMA, UNSPECIFIED WHETHER LONG TERM INSULIN USE (HCC): Primary | ICD-10-CM

## 2019-08-15 LAB
CHP ED QC CHECK: NORMAL
GLUCOSE BLD-MCNC: 196 MG/DL
HBA1C MFR BLD: 9.7 %

## 2019-08-15 PROCEDURE — G8417 CALC BMI ABV UP PARAM F/U: HCPCS | Performed by: INTERNAL MEDICINE

## 2019-08-15 PROCEDURE — 3046F HEMOGLOBIN A1C LEVEL >9.0%: CPT | Performed by: INTERNAL MEDICINE

## 2019-08-15 PROCEDURE — 2022F DILAT RTA XM EVC RTNOPTHY: CPT | Performed by: INTERNAL MEDICINE

## 2019-08-15 PROCEDURE — G8598 ASA/ANTIPLAT THER USED: HCPCS | Performed by: INTERNAL MEDICINE

## 2019-08-15 PROCEDURE — 99213 OFFICE O/P EST LOW 20 MIN: CPT | Performed by: INTERNAL MEDICINE

## 2019-08-15 PROCEDURE — 83036 HEMOGLOBIN GLYCOSYLATED A1C: CPT | Performed by: INTERNAL MEDICINE

## 2019-08-15 PROCEDURE — 82962 GLUCOSE BLOOD TEST: CPT | Performed by: INTERNAL MEDICINE

## 2019-08-15 PROCEDURE — 3017F COLORECTAL CA SCREEN DOC REV: CPT | Performed by: INTERNAL MEDICINE

## 2019-08-15 PROCEDURE — G8427 DOCREV CUR MEDS BY ELIG CLIN: HCPCS | Performed by: INTERNAL MEDICINE

## 2019-08-15 PROCEDURE — 1036F TOBACCO NON-USER: CPT | Performed by: INTERNAL MEDICINE

## 2019-08-22 NOTE — PROGRESS NOTES
Subjective:      Patient ID: Jaswinder Amato is a 66 y.o. male. 3 month f/u on diabetes   Diabetes   He presents for his follow-up diabetic visit. He has type 2 diabetes mellitus. Symptoms are improving. Diabetic complications include heart disease. Current diabetic treatment includes insulin injections (basaglar humalog plus metformin ). He is currently taking insulin pre-breakfast, pre-lunch, pre-dinner and at bedtime. His overall blood glucose range is 180-200 mg/dl. An ACE inhibitor/angiotensin II receptor blocker is being taken. Morbid obesity Body mass index is 44.94 kg/m². Results for Irwin Rivers (MRN [id number]) as of 8/22/2019 07:09   Ref.  Range 5/16/2019 16:58 7/15/2019 10:17 7/16/2019 09:40 8/15/2019 17:13 8/15/2019 17:24   Hemoglobin A1C Latest Units: % 11.9    9.7       Patient Active Problem List   Diagnosis    Hypertension    DM (diabetes mellitus) ([facility]    BMI 40.0-44.9, adult ([facility]    History of tobacco abuse    S/P cardiac pacemaker procedure    Abnormal nuclear stress test    Phlebitis    CAD (coronary artery disease)    Dizziness    Heart palpitations    History of non-ST elevation myocardial infarction (NSTEMI)    A-fib (HCC)    SVT [facility]supra[facility]ricular tachycardia) ([facility])     Allergies   Allergen Reactions    Actos [Pioglitazone]     Byetta 10 Mcg Pen [Exenatide]      Nausea        Current Outpatient Medications:     losartan-hydrochlorothiazide (HYZAAR) 100-25 MG per tablet, TAKE 1 TABLET BY MOUTH  DAILY, Disp: 90 tablet, Rfl: 1    insulin lispro (HUMALOG KWIKPEN) 100 UNIT/ML pen, INJECT 24 UNITS AT [facility] MEAL, Disp: 15 mL, Rfl: 2    Insulin Pen Needle (BD PEN NEEDLE MICRO U/F) 32G X 6 MM MISC, USE 4 TIMES A DAY, Disp: 100 each, Rfl: 2    spironolactone (ALDACTONE) 50 MG tablet, TAKE ONE TABLET BY MOUTH EVERY DAY, Disp: 90 tablet, Rfl: 3    metFORMIN (GLUCOPHAGE-XR) 500 MG extended release tablet, TAKE FOUR (4) TABLETS BY MOUTH EVERY DAY, Disp: 120 tablet, Rfl: 2  

## 2019-09-10 ENCOUNTER — OFFICE VISIT (OUTPATIENT)
Dept: CARDIOLOGY CLINIC | Age: 59
End: 2019-09-10
Payer: COMMERCIAL

## 2019-09-10 VITALS
SYSTOLIC BLOOD PRESSURE: 136 MMHG | HEART RATE: 96 BPM | OXYGEN SATURATION: 98 % | WEIGHT: 315 LBS | BODY MASS INDEX: 40.43 KG/M2 | DIASTOLIC BLOOD PRESSURE: 86 MMHG | RESPIRATION RATE: 22 BRPM | HEIGHT: 74 IN

## 2019-09-10 DIAGNOSIS — Z95.0 S/P CARDIAC PACEMAKER PROCEDURE: ICD-10-CM

## 2019-09-10 DIAGNOSIS — I25.2 HISTORY OF NON-ST ELEVATION MYOCARDIAL INFARCTION (NSTEMI): ICD-10-CM

## 2019-09-10 DIAGNOSIS — I48.0 PAROXYSMAL ATRIAL FIBRILLATION (HCC): ICD-10-CM

## 2019-09-10 DIAGNOSIS — I10 ESSENTIAL HYPERTENSION: Primary | ICD-10-CM

## 2019-09-10 PROCEDURE — 99213 OFFICE O/P EST LOW 20 MIN: CPT | Performed by: INTERNAL MEDICINE

## 2019-09-10 PROCEDURE — G8417 CALC BMI ABV UP PARAM F/U: HCPCS | Performed by: INTERNAL MEDICINE

## 2019-09-10 PROCEDURE — 1036F TOBACCO NON-USER: CPT | Performed by: INTERNAL MEDICINE

## 2019-09-10 PROCEDURE — 3017F COLORECTAL CA SCREEN DOC REV: CPT | Performed by: INTERNAL MEDICINE

## 2019-09-10 PROCEDURE — G8598 ASA/ANTIPLAT THER USED: HCPCS | Performed by: INTERNAL MEDICINE

## 2019-09-10 PROCEDURE — G8427 DOCREV CUR MEDS BY ELIG CLIN: HCPCS | Performed by: INTERNAL MEDICINE

## 2019-09-10 ASSESSMENT — ENCOUNTER SYMPTOMS
SHORTNESS OF BREATH: 0
BLOOD IN STOOL: 0
APNEA: 0
VOMITING: 0
DIARRHEA: 0
NAUSEA: 0
CHEST TIGHTNESS: 0

## 2019-09-10 NOTE — PROGRESS NOTES
control. He had a cardiac cath position by me in the last 6 months. No need for intervention. His morbidly obese. He is a . He is not driving currently. I told him to avoid it. He is on xarelto. I'm going to contact Dr. Victorino Guevara. He  will benefit from ablation. Moderately obese.     1/9/2018: Patient presents today for Follow-up of left heart catheterization at [facility] recently. Showed mild diffuse disease. He was seen at [place] with atrial fibrillation. Was then transferred to [facility] [place]. Troponins were mildly elevated. He was discharged home. 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 Has hypertension which is well-controlled. See me in 3 months. He can return to work with no restrictions.        1/2/2018: Patient presents today for Follow-up of recent evaluations the emergency room for chest pain. Last evaluation was at [facility]. That he was in atrial fibrillation. He was transferred to [facility] [place]. Troponins were mildly elevated. He converted to sinus rhythm. He has multiple risk factors for coronary artery disease. We will schedule him to have a left heart catheterization and coronary angiography on Thursday at [facility] [place]. Dr. [place] backup. Hold the Xarelto tomorrow and Thursday. Hold her diabetes pills on Thursday.     10/11/2017: Patient presents today for follow-up of hypertension. He is a . In December it 24 years since his pacemaker. RSV obese. Still drives a truck. He is diabetic and is uncontrolled. No angina congestive heart failure symptoms of syncope. See me in 6 months.     4/12/201: For follow-up of hypertension. He is a . Had a pacemaker by [employer]. Blood pressure is well-controlled. Gained a lot of weight. He needs to lose that weight see me in 6 months.     10/12/16: *For fu of dizziness,On toprol 100. Norvasc DCd. Aldactone working. K 3.7. Return to work on [employer]. See in 3M     9/21/2016: For fu of CAD. Still has mild tablet 2    methylPREDNISolone (MEDROL, SAMIRA,) 4 MG tablet Take by mouth. 1 kit 0    insulin glargine (BASAGLAR KWIKPEN) 100 UNIT/ML injection pen INJECT 120 UNITS SUBCUTANEOUSLY AT BEDTIME 30 pen 3    metoprolol succinate (TOPROL XL) 100 MG extended release tablet TAKE ONE TABLET BY MOUTH EVERY DAY 90 tablet 3    cetirizine (ZYRTEC ALLERGY) 10 MG tablet Take 1 tablet by mouth daily 10 tablet 0    Insulin Pen Needle (NOVOFINE) 32G X 6 MM MISC Qid 300 each 3    atorvastatin (LIPITOR) 80 MG tablet TAKE 1 TABLET BY MOUTH NIGHTLY  90 tablet 3    rivaroxaban (XARELTO) 20 MG TABS tablet Take 1 tablet by mouth daily (with breakfast) 30 tablet 11    isosorbide mononitrate (IMDUR) 30 MG extended release tablet TAKE ONE TABLET BY MOUTH EVERY DAY 90 tablet 3    glucose blood VI test strips (ONE TOUCH ULTRA TEST) strip Checks 3 times daily. IDDM--ICD-10 E11.9 300 strip 1    aspirin 81 MG tablet Take 81 mg by mouth daily. No current facility-administered medications for this visit. Review of Systems:   Review of Systems   Constitutional: Negative for activity change, appetite change, diaphoresis, fatigue and unexpected weight change. HENT: Negative for facial swelling, nosebleeds, trouble swallowing and voice change. Respiratory: Negative for apnea, chest tightness, shortness of breath and wheezing. Cardiovascular: Negative for chest pain, palpitations and leg swelling. Gastrointestinal: Negative for abdominal distention, anal bleeding, blood in stool, diarrhea, nausea and vomiting. Genitourinary: Negative for decreased urine volume and dysuria. Musculoskeletal: Negative for gait problem, myalgias, neck pain and neck stiffness. Skin: Negative for color change, pallor, rash and wound. Neurological: Negative for dizziness, seizures, syncope, facial asymmetry, weakness, light-headedness, numbness and headaches. Hematological: Does not bruise/bleed easily.    Psychiatric/Behavioral: Negative

## 2019-09-15 ASSESSMENT — ENCOUNTER SYMPTOMS
WHEEZING: 0
ABDOMINAL DISTENTION: 0
COLOR CHANGE: 0
ANAL BLEEDING: 0
FACIAL SWELLING: 0
VOICE CHANGE: 0
TROUBLE SWALLOWING: 0

## 2019-09-18 RX ORDER — METFORMIN HYDROCHLORIDE 500 MG/1
TABLET, EXTENDED RELEASE ORAL
Qty: 120 TABLET | Refills: 1 | Status: SHIPPED | OUTPATIENT
Start: 2019-09-18 | End: 2019-11-30 | Stop reason: SDUPTHER

## 2019-10-17 RX ORDER — RIVAROXABAN 20 MG/1
TABLET, FILM COATED ORAL
Qty: 90 TABLET | Refills: 3 | Status: SHIPPED | OUTPATIENT
Start: 2019-10-17 | End: 2020-08-10

## 2019-10-17 RX ORDER — ISOSORBIDE MONONITRATE 30 MG/1
TABLET, EXTENDED RELEASE ORAL
Qty: 90 TABLET | Refills: 3 | Status: SHIPPED | OUTPATIENT
Start: 2019-10-17 | End: 2020-09-22

## 2019-10-25 RX ORDER — INSULIN LISPRO 100 [IU]/ML
INJECTION, SOLUTION INTRAVENOUS; SUBCUTANEOUS
Qty: 15 ML | Refills: 2 | Status: SHIPPED | OUTPATIENT
Start: 2019-10-25

## 2019-10-25 RX ORDER — INSULIN LISPRO 100 [IU]/ML
INJECTION, SOLUTION INTRAVENOUS; SUBCUTANEOUS
Qty: 15 ML | Refills: 3 | Status: SHIPPED | OUTPATIENT
Start: 2019-10-25 | End: 2020-03-17

## 2019-10-30 ENCOUNTER — OFFICE VISIT (OUTPATIENT)
Dept: FAMILY MEDICINE CLINIC | Age: 59
End: 2019-10-30
Payer: COMMERCIAL

## 2019-10-30 ENCOUNTER — TELEPHONE (OUTPATIENT)
Dept: FAMILY MEDICINE CLINIC | Age: 59
End: 2019-10-30

## 2019-10-30 VITALS
DIASTOLIC BLOOD PRESSURE: 66 MMHG | BODY MASS INDEX: 40.43 KG/M2 | OXYGEN SATURATION: 96 % | TEMPERATURE: 97.8 F | HEIGHT: 74 IN | HEART RATE: 80 BPM | WEIGHT: 315 LBS | SYSTOLIC BLOOD PRESSURE: 126 MMHG

## 2019-10-30 DIAGNOSIS — R93.89 ABNORMAL CXR: Primary | ICD-10-CM

## 2019-10-30 DIAGNOSIS — R05.9 COUGH: Primary | ICD-10-CM

## 2019-10-30 DIAGNOSIS — J40 BRONCHITIS: ICD-10-CM

## 2019-10-30 PROCEDURE — G8598 ASA/ANTIPLAT THER USED: HCPCS | Performed by: NURSE PRACTITIONER

## 2019-10-30 PROCEDURE — 99214 OFFICE O/P EST MOD 30 MIN: CPT | Performed by: NURSE PRACTITIONER

## 2019-10-30 PROCEDURE — G8427 DOCREV CUR MEDS BY ELIG CLIN: HCPCS | Performed by: NURSE PRACTITIONER

## 2019-10-30 PROCEDURE — 94640 AIRWAY INHALATION TREATMENT: CPT | Performed by: NURSE PRACTITIONER

## 2019-10-30 PROCEDURE — 3017F COLORECTAL CA SCREEN DOC REV: CPT | Performed by: NURSE PRACTITIONER

## 2019-10-30 PROCEDURE — G8484 FLU IMMUNIZE NO ADMIN: HCPCS | Performed by: NURSE PRACTITIONER

## 2019-10-30 PROCEDURE — 1036F TOBACCO NON-USER: CPT | Performed by: NURSE PRACTITIONER

## 2019-10-30 PROCEDURE — G8417 CALC BMI ABV UP PARAM F/U: HCPCS | Performed by: NURSE PRACTITIONER

## 2019-10-30 RX ORDER — ALBUTEROL SULFATE 90 UG/1
2 AEROSOL, METERED RESPIRATORY (INHALATION) EVERY 6 HOURS PRN
Qty: 1 INHALER | Refills: 0 | Status: SHIPPED | OUTPATIENT
Start: 2019-10-30 | End: 2020-02-03

## 2019-10-30 RX ORDER — AMOXICILLIN AND CLAVULANATE POTASSIUM 875; 125 MG/1; MG/1
1 TABLET, FILM COATED ORAL 2 TIMES DAILY
Qty: 20 TABLET | Refills: 0 | Status: SHIPPED | OUTPATIENT
Start: 2019-10-30 | End: 2019-11-09

## 2019-10-30 RX ORDER — IPRATROPIUM BROMIDE AND ALBUTEROL SULFATE 2.5; .5 MG/3ML; MG/3ML
1 SOLUTION RESPIRATORY (INHALATION) ONCE
Status: COMPLETED | OUTPATIENT
Start: 2019-10-30 | End: 2019-10-30

## 2019-10-30 RX ADMIN — IPRATROPIUM BROMIDE AND ALBUTEROL SULFATE 1 AMPULE: 2.5; .5 SOLUTION RESPIRATORY (INHALATION) at 14:09

## 2019-10-30 ASSESSMENT — ENCOUNTER SYMPTOMS
SHORTNESS OF BREATH: 1
SORE THROAT: 0
RHINORRHEA: 0
WHEEZING: 1
COUGH: 1

## 2019-10-31 ENCOUNTER — TELEPHONE (OUTPATIENT)
Dept: FAMILY MEDICINE CLINIC | Age: 59
End: 2019-10-31

## 2019-11-05 ENCOUNTER — HOSPITAL ENCOUNTER (OUTPATIENT)
Dept: CT IMAGING | Age: 59
Discharge: HOME | End: 2019-11-07
Payer: COMMERCIAL

## 2019-11-05 ENCOUNTER — TELEPHONE (OUTPATIENT)
Dept: FAMILY MEDICINE CLINIC | Age: 59
End: 2019-11-05

## 2019-11-05 VITALS
BODY MASS INDEX: 39.17 KG/M2 | DIASTOLIC BLOOD PRESSURE: 98 MMHG | HEIGHT: 75 IN | SYSTOLIC BLOOD PRESSURE: 159 MMHG | WEIGHT: 315 LBS

## 2019-11-05 DIAGNOSIS — R93.89 ABNORMAL CXR: ICD-10-CM

## 2019-11-05 PROCEDURE — 71260 CT THORAX DX C+: CPT

## 2019-11-05 PROCEDURE — 6360000004 HC RX CONTRAST MEDICATION: Performed by: NURSE PRACTITIONER

## 2019-11-05 RX ORDER — SODIUM CHLORIDE 0.9 % (FLUSH) 0.9 %
10 SYRINGE (ML) INJECTION
Status: DISPENSED | OUTPATIENT
Start: 2019-11-05 | End: 2019-11-05

## 2019-11-05 RX ADMIN — IOPAMIDOL 75 ML: 612 INJECTION, SOLUTION INTRAVENOUS at 10:31

## 2019-11-06 LAB
GFR AFRICAN AMERICAN: >60
GFR NON-AFRICAN AMERICAN: >60
PERFORMED ON: ABNORMAL
POC CREATININE: 0.6 MG/DL (ref 0.9–1.3)
POC SAMPLE TYPE: ABNORMAL

## 2019-11-07 ENCOUNTER — TELEPHONE (OUTPATIENT)
Dept: FAMILY MEDICINE CLINIC | Age: 59
End: 2019-11-07

## 2019-11-13 ENCOUNTER — OFFICE VISIT (OUTPATIENT)
Dept: FAMILY MEDICINE CLINIC | Age: 59
End: 2019-11-13
Payer: COMMERCIAL

## 2019-11-13 VITALS
SYSTOLIC BLOOD PRESSURE: 132 MMHG | BODY MASS INDEX: 39.17 KG/M2 | OXYGEN SATURATION: 95 % | DIASTOLIC BLOOD PRESSURE: 70 MMHG | HEART RATE: 96 BPM | TEMPERATURE: 96.8 F | WEIGHT: 315 LBS | HEIGHT: 75 IN

## 2019-11-13 DIAGNOSIS — R09.82 PND (POST-NASAL DRIP): ICD-10-CM

## 2019-11-13 DIAGNOSIS — R05.9 COUGH: Primary | ICD-10-CM

## 2019-11-13 DIAGNOSIS — R93.89 ABNORMAL CXR: ICD-10-CM

## 2019-11-13 PROCEDURE — G8598 ASA/ANTIPLAT THER USED: HCPCS | Performed by: NURSE PRACTITIONER

## 2019-11-13 PROCEDURE — 3017F COLORECTAL CA SCREEN DOC REV: CPT | Performed by: NURSE PRACTITIONER

## 2019-11-13 PROCEDURE — G8427 DOCREV CUR MEDS BY ELIG CLIN: HCPCS | Performed by: NURSE PRACTITIONER

## 2019-11-13 PROCEDURE — G8417 CALC BMI ABV UP PARAM F/U: HCPCS | Performed by: NURSE PRACTITIONER

## 2019-11-13 PROCEDURE — 1036F TOBACCO NON-USER: CPT | Performed by: NURSE PRACTITIONER

## 2019-11-13 PROCEDURE — G8484 FLU IMMUNIZE NO ADMIN: HCPCS | Performed by: NURSE PRACTITIONER

## 2019-11-13 PROCEDURE — 99213 OFFICE O/P EST LOW 20 MIN: CPT | Performed by: NURSE PRACTITIONER

## 2019-11-13 RX ORDER — FLUTICASONE PROPIONATE 50 MCG
2 SPRAY, SUSPENSION (ML) NASAL DAILY
Qty: 1 BOTTLE | Refills: 3 | Status: SHIPPED | OUTPATIENT
Start: 2019-11-13 | End: 2020-02-27

## 2019-11-13 ASSESSMENT — ENCOUNTER SYMPTOMS
SHORTNESS OF BREATH: 1
SORE THROAT: 0
WHEEZING: 1
COUGH: 1
RHINORRHEA: 0

## 2019-11-16 ASSESSMENT — ENCOUNTER SYMPTOMS
NAUSEA: 0
VOMITING: 0
ABDOMINAL DISTENTION: 0

## 2019-12-02 ENCOUNTER — HOSPITAL ENCOUNTER (OUTPATIENT)
Dept: PULMONOLOGY | Age: 59
Discharge: HOME | End: 2019-12-02
Payer: COMMERCIAL

## 2019-12-02 DIAGNOSIS — R93.89 ABNORMAL CXR: ICD-10-CM

## 2019-12-02 DIAGNOSIS — R05.9 COUGH: ICD-10-CM

## 2019-12-02 PROCEDURE — 94060 EVALUATION OF WHEEZING: CPT | Performed by: INTERNAL MEDICINE

## 2019-12-02 PROCEDURE — 94060 EVALUATION OF WHEEZING: CPT

## 2019-12-02 PROCEDURE — 6360000002 HC RX W HCPCS: Performed by: NURSE PRACTITIONER

## 2019-12-02 RX ORDER — ALBUTEROL SULFATE 2.5 MG/3ML
2.5 SOLUTION RESPIRATORY (INHALATION) ONCE
Status: COMPLETED | OUTPATIENT
Start: 2019-12-02 | End: 2019-12-02

## 2019-12-02 RX ORDER — METFORMIN HYDROCHLORIDE 500 MG/1
TABLET, EXTENDED RELEASE ORAL
Qty: 120 TABLET | Refills: 0 | Status: SHIPPED | OUTPATIENT
Start: 2019-12-02 | End: 2019-12-05 | Stop reason: SDUPTHER

## 2019-12-02 RX ADMIN — ALBUTEROL SULFATE 2.5 MG: 2.5 SOLUTION RESPIRATORY (INHALATION) at 13:47

## 2019-12-05 ENCOUNTER — TELEPHONE (OUTPATIENT)
Dept: FAMILY MEDICINE CLINIC | Age: 59
End: 2019-12-05

## 2019-12-05 ENCOUNTER — OFFICE VISIT (OUTPATIENT)
Dept: ENDOCRINOLOGY | Age: 59
End: 2019-12-05
Payer: COMMERCIAL

## 2019-12-05 VITALS
HEIGHT: 75 IN | HEART RATE: 87 BPM | DIASTOLIC BLOOD PRESSURE: 80 MMHG | WEIGHT: 315 LBS | SYSTOLIC BLOOD PRESSURE: 150 MMHG | BODY MASS INDEX: 39.17 KG/M2

## 2019-12-05 DIAGNOSIS — E11.00 TYPE 2 DIABETES MELLITUS WITH HYPEROSMOLARITY WITHOUT COMA, UNSPECIFIED WHETHER LONG TERM INSULIN USE (HCC): Primary | ICD-10-CM

## 2019-12-05 LAB
CHP ED QC CHECK: NORMAL
GLUCOSE BLD-MCNC: 95 MG/DL
HBA1C MFR BLD: 10.5 %

## 2019-12-05 PROCEDURE — G8427 DOCREV CUR MEDS BY ELIG CLIN: HCPCS | Performed by: INTERNAL MEDICINE

## 2019-12-05 PROCEDURE — G8484 FLU IMMUNIZE NO ADMIN: HCPCS | Performed by: INTERNAL MEDICINE

## 2019-12-05 PROCEDURE — 99213 OFFICE O/P EST LOW 20 MIN: CPT | Performed by: INTERNAL MEDICINE

## 2019-12-05 PROCEDURE — G8598 ASA/ANTIPLAT THER USED: HCPCS | Performed by: INTERNAL MEDICINE

## 2019-12-05 PROCEDURE — 3046F HEMOGLOBIN A1C LEVEL >9.0%: CPT | Performed by: INTERNAL MEDICINE

## 2019-12-05 PROCEDURE — 82962 GLUCOSE BLOOD TEST: CPT | Performed by: INTERNAL MEDICINE

## 2019-12-05 PROCEDURE — 1036F TOBACCO NON-USER: CPT | Performed by: INTERNAL MEDICINE

## 2019-12-05 PROCEDURE — 3017F COLORECTAL CA SCREEN DOC REV: CPT | Performed by: INTERNAL MEDICINE

## 2019-12-05 PROCEDURE — 83036 HEMOGLOBIN GLYCOSYLATED A1C: CPT | Performed by: INTERNAL MEDICINE

## 2019-12-05 PROCEDURE — G8417 CALC BMI ABV UP PARAM F/U: HCPCS | Performed by: INTERNAL MEDICINE

## 2019-12-05 PROCEDURE — 2022F DILAT RTA XM EVC RTNOPTHY: CPT | Performed by: INTERNAL MEDICINE

## 2019-12-05 RX ORDER — METFORMIN HYDROCHLORIDE 500 MG/1
TABLET, EXTENDED RELEASE ORAL
Qty: 120 TABLET | Refills: 5 | Status: SHIPPED | OUTPATIENT
Start: 2019-12-05 | End: 2020-06-16

## 2019-12-09 RX ORDER — LOSARTAN POTASSIUM AND HYDROCHLOROTHIAZIDE 25; 100 MG/1; MG/1
TABLET ORAL
Qty: 90 TABLET | Refills: 1 | Status: CANCELLED | OUTPATIENT
Start: 2019-12-09

## 2019-12-10 RX ORDER — HYDROCHLOROTHIAZIDE 25 MG/1
25 TABLET ORAL EVERY MORNING
Qty: 90 TABLET | Refills: 0 | Status: SHIPPED | OUTPATIENT
Start: 2019-12-10 | End: 2020-01-20

## 2019-12-10 RX ORDER — LOSARTAN POTASSIUM 100 MG/1
100 TABLET ORAL DAILY
Qty: 90 TABLET | Refills: 0 | Status: SHIPPED | OUTPATIENT
Start: 2019-12-10 | End: 2020-01-20

## 2019-12-13 ENCOUNTER — OFFICE VISIT (OUTPATIENT)
Dept: FAMILY MEDICINE CLINIC | Age: 59
End: 2019-12-13
Payer: COMMERCIAL

## 2019-12-13 VITALS
SYSTOLIC BLOOD PRESSURE: 136 MMHG | HEIGHT: 75 IN | WEIGHT: 315 LBS | OXYGEN SATURATION: 95 % | BODY MASS INDEX: 39.17 KG/M2 | DIASTOLIC BLOOD PRESSURE: 64 MMHG | HEART RATE: 86 BPM | TEMPERATURE: 97.6 F

## 2019-12-13 DIAGNOSIS — J44.9 CHRONIC OBSTRUCTIVE PULMONARY DISEASE, UNSPECIFIED COPD TYPE (HCC): Primary | ICD-10-CM

## 2019-12-13 DIAGNOSIS — R05.9 COUGH: ICD-10-CM

## 2019-12-13 DIAGNOSIS — H66.001 NON-RECURRENT ACUTE SUPPURATIVE OTITIS MEDIA OF RIGHT EAR WITHOUT SPONTANEOUS RUPTURE OF TYMPANIC MEMBRANE: ICD-10-CM

## 2019-12-13 PROCEDURE — G8598 ASA/ANTIPLAT THER USED: HCPCS | Performed by: NURSE PRACTITIONER

## 2019-12-13 PROCEDURE — 99214 OFFICE O/P EST MOD 30 MIN: CPT | Performed by: NURSE PRACTITIONER

## 2019-12-13 PROCEDURE — G8427 DOCREV CUR MEDS BY ELIG CLIN: HCPCS | Performed by: NURSE PRACTITIONER

## 2019-12-13 PROCEDURE — G8926 SPIRO NO PERF OR DOC: HCPCS | Performed by: NURSE PRACTITIONER

## 2019-12-13 PROCEDURE — 3023F SPIROM DOC REV: CPT | Performed by: NURSE PRACTITIONER

## 2019-12-13 PROCEDURE — G8484 FLU IMMUNIZE NO ADMIN: HCPCS | Performed by: NURSE PRACTITIONER

## 2019-12-13 PROCEDURE — G8417 CALC BMI ABV UP PARAM F/U: HCPCS | Performed by: NURSE PRACTITIONER

## 2019-12-13 PROCEDURE — 1036F TOBACCO NON-USER: CPT | Performed by: NURSE PRACTITIONER

## 2019-12-13 PROCEDURE — 3017F COLORECTAL CA SCREEN DOC REV: CPT | Performed by: NURSE PRACTITIONER

## 2019-12-13 RX ORDER — AMOXICILLIN AND CLAVULANATE POTASSIUM 875; 125 MG/1; MG/1
TABLET, FILM COATED ORAL
Refills: 0 | COMMUNITY
Start: 2019-12-09 | End: 2020-01-20

## 2019-12-13 RX ORDER — BUDESONIDE AND FORMOTEROL FUMARATE DIHYDRATE 160; 4.5 UG/1; UG/1
2 AEROSOL RESPIRATORY (INHALATION) 2 TIMES DAILY
Qty: 1 INHALER | Refills: 3 | Status: SHIPPED | OUTPATIENT
Start: 2019-12-13 | End: 2020-06-03

## 2019-12-13 RX ORDER — BUDESONIDE AND FORMOTEROL FUMARATE DIHYDRATE 160; 4.5 UG/1; UG/1
2 AEROSOL RESPIRATORY (INHALATION) 2 TIMES DAILY
Qty: 1 INHALER | Refills: 0 | COMMUNITY
Start: 2019-12-13 | End: 2020-01-20

## 2019-12-13 ASSESSMENT — ENCOUNTER SYMPTOMS
RHINORRHEA: 0
SHORTNESS OF BREATH: 0
SORE THROAT: 0
COUGH: 1
WHEEZING: 1

## 2019-12-16 ENCOUNTER — TELEPHONE (OUTPATIENT)
Dept: ENDOCRINOLOGY | Age: 59
End: 2019-12-16

## 2020-01-20 ENCOUNTER — OFFICE VISIT (OUTPATIENT)
Dept: FAMILY MEDICINE CLINIC | Age: 60
End: 2020-01-20
Payer: COMMERCIAL

## 2020-01-20 VITALS
HEART RATE: 64 BPM | BODY MASS INDEX: 39.17 KG/M2 | OXYGEN SATURATION: 97 % | TEMPERATURE: 97.6 F | DIASTOLIC BLOOD PRESSURE: 84 MMHG | SYSTOLIC BLOOD PRESSURE: 130 MMHG | WEIGHT: 315 LBS | RESPIRATION RATE: 14 BRPM | HEIGHT: 75 IN

## 2020-01-20 PROCEDURE — G8926 SPIRO NO PERF OR DOC: HCPCS | Performed by: FAMILY MEDICINE

## 2020-01-20 PROCEDURE — 1036F TOBACCO NON-USER: CPT | Performed by: FAMILY MEDICINE

## 2020-01-20 PROCEDURE — G8427 DOCREV CUR MEDS BY ELIG CLIN: HCPCS | Performed by: FAMILY MEDICINE

## 2020-01-20 PROCEDURE — 3017F COLORECTAL CA SCREEN DOC REV: CPT | Performed by: FAMILY MEDICINE

## 2020-01-20 PROCEDURE — G8484 FLU IMMUNIZE NO ADMIN: HCPCS | Performed by: FAMILY MEDICINE

## 2020-01-20 PROCEDURE — 99214 OFFICE O/P EST MOD 30 MIN: CPT | Performed by: FAMILY MEDICINE

## 2020-01-20 PROCEDURE — G8417 CALC BMI ABV UP PARAM F/U: HCPCS | Performed by: FAMILY MEDICINE

## 2020-01-20 PROCEDURE — 3023F SPIROM DOC REV: CPT | Performed by: FAMILY MEDICINE

## 2020-01-20 ASSESSMENT — COPD QUESTIONNAIRES: COPD: 1

## 2020-01-20 ASSESSMENT — ENCOUNTER SYMPTOMS
ABDOMINAL PAIN: 0
SHORTNESS OF BREATH: 0

## 2020-01-20 NOTE — PROGRESS NOTES
SKIN THREE TIMES DAILY BEFORE MEALS 15 mL 2    insulin lispro, 1 Unit Dial, (HUMALOG KWIKPEN) 100 UNIT/ML SOPN Inject 24 units at each meal 15 mL 3    isosorbide mononitrate (IMDUR) 30 MG extended release tablet TAKE ONE TABLET BY MOUTH EVERY DAY 90 tablet 3    XARELTO 20 MG TABS tablet TAKE ONE TABLET BY MOUTH DAILY WITH BREAKFAST 90 tablet 3    Insulin Pen Needle (NOVOFINE) 32G X 6 MM MISC USE 4 TIMES DAILY 100 each 3    losartan-hydrochlorothiazide (HYZAAR) 100-25 MG per tablet TAKE 1 TABLET BY MOUTH  DAILY 90 tablet 1    spironolactone (ALDACTONE) 50 MG tablet TAKE ONE TABLET BY MOUTH EVERY DAY 90 tablet 3    insulin glargine (BASAGLAR KWIKPEN) 100 UNIT/ML injection pen INJECT 120 UNITS SUBCUTANEOUSLY AT BEDTIME 30 pen 3    metoprolol succinate (TOPROL XL) 100 MG extended release tablet TAKE ONE TABLET BY MOUTH EVERY DAY 90 tablet 3    Insulin Pen Needle (NOVOFINE) 32G X 6 MM MISC Qid 300 each 3    glucose blood VI test strips (ONE TOUCH ULTRA TEST) strip Checks 3 times daily. IDDM--ICD-10 E11.9 300 strip 1    aspirin 81 MG tablet Take 81 mg by mouth daily. No current facility-administered medications for this visit.       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

## 2020-02-03 NOTE — TELEPHONE ENCOUNTER
Rx requested:  Requested Prescriptions     Pending Prescriptions Disp Refills    VENTOLIN  (90 Base) MCG/ACT inhaler [Pharmacy Med Name: Ventolin HFA Inhalation Aerosol Solution 108 (90 Base) MCG/ACT] 18 g 0     Sig: INHALE TWO PUFFS BY MOUTH EVERY 6 HOURS AS NEEDED for wheezing or shortness of breath       Last Office Visit:   12/13/2019      Next Visit Date:  Future Appointments   Date Time Provider hospitals   3/5/2020  5:00 PM Radha Torrez  [facility]   7/20/2020  4:00 PM Ludwin Dutton MD [address] [place] [address]

## 2020-02-27 RX ORDER — FLUTICASONE PROPIONATE 50 MCG
SPRAY, SUSPENSION (ML) NASAL
Qty: 16 G | Refills: 0 | Status: SHIPPED | OUTPATIENT
Start: 2020-02-27

## 2020-02-27 RX ORDER — LOSARTAN POTASSIUM 100 MG/1
TABLET ORAL
Qty: 90 TABLET | Refills: 0 | Status: SHIPPED | OUTPATIENT
Start: 2020-02-27 | End: 2020-05-20

## 2020-02-27 NOTE — TELEPHONE ENCOUNTER
Requesting medication refill.  Please approve or deny this request.    Rx requested:  Requested Prescriptions     Pending Prescriptions Disp Refills    fluticasone (FLONASE) 50 MCG/ACT nasal spray [Pharmacy Med Name: Fluticasone Propionate Nasal Suspension 50 MCG/ACT] 16 g 0     Sig: USE 2 SPRAYS NASALLY DAILY       Last Office Visit:   12/13/2019    Last Labs:      Next Visit Date:  Future Appointments   Date Time Provider [facility] [place]   3/5/2020  5:00 PM Lorena Tidwell  [facility]   7/20/2020  4:00 PM Mandy Juarez MD [address] [facility]

## 2020-03-05 ENCOUNTER — OFFICE VISIT (OUTPATIENT)
Dept: ENDOCRINOLOGY | Age: 60
End: 2020-03-05
Payer: COMMERCIAL

## 2020-03-05 VITALS
DIASTOLIC BLOOD PRESSURE: 81 MMHG | WEIGHT: 315 LBS | BODY MASS INDEX: 39.17 KG/M2 | HEART RATE: 80 BPM | HEIGHT: 75 IN | SYSTOLIC BLOOD PRESSURE: 147 MMHG

## 2020-03-05 LAB
CHP ED QC CHECK: NORMAL
GLUCOSE BLD-MCNC: 145 MG/DL
HBA1C MFR BLD: 11 %

## 2020-03-05 PROCEDURE — 83036 HEMOGLOBIN GLYCOSYLATED A1C: CPT | Performed by: INTERNAL MEDICINE

## 2020-03-05 PROCEDURE — 2022F DILAT RTA XM EVC RTNOPTHY: CPT | Performed by: INTERNAL MEDICINE

## 2020-03-05 PROCEDURE — G8417 CALC BMI ABV UP PARAM F/U: HCPCS | Performed by: INTERNAL MEDICINE

## 2020-03-05 PROCEDURE — G8427 DOCREV CUR MEDS BY ELIG CLIN: HCPCS | Performed by: INTERNAL MEDICINE

## 2020-03-05 PROCEDURE — 1036F TOBACCO NON-USER: CPT | Performed by: INTERNAL MEDICINE

## 2020-03-05 PROCEDURE — 82962 GLUCOSE BLOOD TEST: CPT | Performed by: INTERNAL MEDICINE

## 2020-03-05 PROCEDURE — 3046F HEMOGLOBIN A1C LEVEL >9.0%: CPT | Performed by: INTERNAL MEDICINE

## 2020-03-05 PROCEDURE — 99213 OFFICE O/P EST LOW 20 MIN: CPT | Performed by: INTERNAL MEDICINE

## 2020-03-05 PROCEDURE — G8484 FLU IMMUNIZE NO ADMIN: HCPCS | Performed by: INTERNAL MEDICINE

## 2020-03-05 PROCEDURE — 3017F COLORECTAL CA SCREEN DOC REV: CPT | Performed by: INTERNAL MEDICINE

## 2020-03-09 RX ORDER — HYDROCHLOROTHIAZIDE 25 MG/1
TABLET ORAL
Qty: 90 TABLET | Refills: 0 | Status: SHIPPED | OUTPATIENT
Start: 2020-03-09 | End: 2020-06-01

## 2020-03-11 NOTE — PROGRESS NOTES
Subjective:      Patient ID: Denisse De Leon is a 64 y.o. male. 3  months/u on diabetes hbaic higher   Diabetes   He presents for his follow-up diabetic visit. He has type 2 diabetes mellitus. Diabetic complications include heart disease. Current diabetic treatment includes insulin injections (basagalr humalog plus metformin ). He is currently taking insulin pre-breakfast, pre-lunch, pre-dinner and at bedtime. His overall blood glucose range is >200 mg/dl. (Lab Results       Component                Value               Date                       LABA1C                   11.0                03/05/2020              ) An ACE inhibitor/angiotensin II receptor blocker is being taken. Results for Grecia Blakely (MRN [id number]) as of 3/11/2020 07:04   Ref. Range 12/5/2019 17:43 3/5/2020 17:23 3/5/2020 17:54   Hemoglobin A1C Latest Units: % 10.5  11.0       Morbid obesity Body mass index is 44.62 kg/m².       Patient Active Problem List   Diagnosis    Hypertension    DM [facility]diabetes mellitus) ([facility]    BMI 40.0-44.9, adult ([facility]    History of tobacco abuse    S/P cardiac pacemaker procedure    Abnormal nuclear stress test    Phlebitis    CAD (coronary artery disease)    Dizziness    Heart palpitations    History of non-ST elevation myocardial infarction (NSTEMI)    A-fib ([facility]    SVT (supraventricular tachycardia) ([facility])    Chronic obstructive pulmonary disease ([facility])     Allergies   Allergen Reactions    Actos [Pioglitazone]     Byetta 10 Mcg Pen [Exenatide]      Nausea        Current Outpatient Medications:     fluticasone (FLONASE) 50 MCG/ACT nasal spray, USE 2 SPRAYS NASALLY DAILY , Disp: 16 g, Rfl: 0    losartan (COZAAR) 100 MG tablet, TAKE ONE TABLET BY MOUTH DAILY, Disp: 90 tablet, Rfl: 0    Insulin Pen Needle (NOVOFINE) 32G X 6 MM MISC, USE 4 TIMES DAILY, Disp: 200 each, Rfl: 3    VENTOLIN  (90 Base) MCG/ACT inhaler, INHALE TWO PUFFS BY MOUTH EVERY 6 HOURS AS NEEDED for wheezing or shortness of breath, Disp: 18 g, Rfl: 0    budesonide-formoterol (SYMBICORT) 160-4.5 MCG/ACT AERO, Inhale 2 puffs into the lungs 2 times daily, Disp: 1 Inhaler, Rfl: 3    metFORMIN (GLUCOPHAGE-XR) 500 MG extended release tablet, TAKE FOUR (4) TABLETS BY MOUTH EVERY DAY, Disp: 120 tablet, Rfl: 5    atorvastatin (LIPITOR) 80 MG tablet, take one tablet by mouth nightly, Disp: 90 tablet, Rfl: 3    insulin lispro, 1 Unit Dial, (HUMALOG KWIKPEN) 100 UNIT/ML SOPN, INJECT 24 UNITS INTO THE SKIN THREE TIMES DAILY BEFORE MEALS, Disp: 15 mL, Rfl: 2    insulin lispro, 1 Unit Dial, (HUMALOG KWIKPEN) 100 UNIT/ML SOPN, Inject 24 units at each meal, Disp: 15 mL, Rfl: 3    isosorbide mononitrate (IMDUR) 30 MG extended release tablet, TAKE ONE TABLET BY MOUTH EVERY DAY, Disp: 90 tablet, Rfl: 3    XARELTO 20 MG TABS tablet, TAKE ONE TABLET BY MOUTH DAILY WITH BREAKFAST, Disp: 90 tablet, Rfl: 3    losartan-hydrochlorothiazide (HYZAAR) 100-25 MG per tablet, TAKE 1 TABLET BY MOUTH  DAILY, Disp: 90 tablet, Rfl: 1    spironolactone (ALDACTONE) 50 MG tablet, TAKE ONE TABLET BY MOUTH EVERY DAY, Disp: 90 tablet, Rfl: 3    insulin glargine (BASAGLAR KWIKPEN) 100 UNIT/ML injection pen, INJECT 120 UNITS SUBCUTANEOUSLY AT BEDTIME, Disp: 30 pen, Rfl: 3    metoprolol succinate (TOPROL XL) 100 MG extended release tablet, TAKE ONE TABLET BY MOUTH EVERY DAY, Disp: 90 tablet, Rfl: 3    Insulin Pen Needle (NOVOFINE) 32G X 6 MM MISC, Qid, Disp: 300 each, Rfl: 3    glucose blood VI test strips (ONE TOUCH ULTRA TEST) strip, Checks 3 times daily. IDDM--ICD-10 E11.9, Disp: 300 strip, Rfl: 1    aspirin 81 MG tablet, Take 81 mg by mouth daily. , Disp: , Rfl:     hydroCHLOROthiazide (HYDRODIURIL) 25 MG tablet, TAKE ONE TABLET BY MOUTH IN THE MORNING, Disp: 90 tablet, Rfl: 0        Review of Systems   All other systems reviewed and are negative.       Vitals:    03/05/20 1718   BP: (!) 147/81   Pulse: 80   Weight: (!) 357 lb (161.9 kg)   Height: 6' 3\" (1.905 m)       Objective:   Physical Exam  Constitutional:       Appearance: Normal appearance. He is obese. HENT:      Head: Normocephalic and atraumatic. Neck:      Musculoskeletal: Normal range of motion and neck supple. Cardiovascular:      Rate and Rhythm: Normal rate. Neurological:      Mental Status: He is alert. Psychiatric:         Mood and Affect: Mood normal.         Assessment:       Diagnosis Orders   1.  Type 2 diabetes mellitus with hyperosmolarity without coma, unspecified whether long term insulin use (HCC)  POCT glycosylated hemoglobin (Hb A1C)    POCT Glucose    Hemoglobin Z4J    Basic Metabolic Panel    Microalbumin / Creatinine Urine Ratio           Plan:      Orders Placed This Encounter   Procedures    Hemoglobin A1C     Standing Status:   Future     Standing Expiration Date:   3/5/2021    Basic Metabolic Panel     Standing Status:   Future     Standing Expiration Date:   3/5/2021    Microalbumin / Creatinine Urine Ratio     Standing Status:   Future     Standing Expiration Date:   3/5/2021    POCT glycosylated hemoglobin (Hb A1C)    POCT Glucose     Continue basagalr 120 units hs humalog 24 units tid plus metformin 1000 mg bid   Compliance stressed           Yolande Jones MD

## 2020-03-17 ENCOUNTER — HOSPITAL ENCOUNTER (EMERGENCY)
Age: 60
Discharge: HOME | End: 2020-03-17
Payer: COMMERCIAL

## 2020-03-17 ENCOUNTER — APPOINTMENT (OUTPATIENT)
Dept: GENERAL RADIOLOGY | Age: 60
End: 2020-03-17
Payer: COMMERCIAL

## 2020-03-17 ENCOUNTER — TELEPHONE (OUTPATIENT)
Dept: FAMILY MEDICINE CLINIC | Age: 60
End: 2020-03-17

## 2020-03-17 VITALS
BODY MASS INDEX: 39.17 KG/M2 | HEART RATE: 78 BPM | DIASTOLIC BLOOD PRESSURE: 75 MMHG | RESPIRATION RATE: 18 BRPM | OXYGEN SATURATION: 95 % | WEIGHT: 315 LBS | SYSTOLIC BLOOD PRESSURE: 135 MMHG | HEIGHT: 75 IN | TEMPERATURE: 98.4 F

## 2020-03-17 LAB
ALBUMIN SERPL-MCNC: 3.9 G/DL (ref 3.5–4.6)
ALP BLD-CCNC: 69 U/L (ref 35–104)
ALT SERPL-CCNC: 20 U/L (ref 0–41)
ANION GAP SERPL CALCULATED.3IONS-SCNC: 14 MEQ/L (ref 9–15)
ANISOCYTOSIS: ABNORMAL
AST SERPL-CCNC: 14 U/L (ref 0–40)
BANDED NEUTROPHILS RELATIVE PERCENT: 6 %
BASOPHILS ABSOLUTE: 0 K/UL (ref 0–0.2)
BASOPHILS RELATIVE PERCENT: 0.7 %
BILIRUB SERPL-MCNC: 0.4 MG/DL (ref 0.2–0.7)
BUN BLDV-MCNC: 15 MG/DL (ref 6–20)
CALCIUM SERPL-MCNC: 9.7 MG/DL (ref 8.5–9.9)
CHLORIDE BLD-SCNC: 95 MEQ/L (ref 95–107)
CO2: 25 MEQ/L (ref 20–31)
CREAT SERPL-MCNC: 0.7 MG/DL (ref 0.7–1.2)
EOSINOPHILS ABSOLUTE: 0.2 K/UL (ref 0–0.7)
EOSINOPHILS RELATIVE PERCENT: 2 %
GFR AFRICAN AMERICAN: >60
GFR NON-AFRICAN AMERICAN: >60
GLOBULIN: 3.1 G/DL (ref 2.3–3.5)
GLUCOSE BLD-MCNC: 375 MG/DL (ref 70–99)
HCT VFR BLD CALC: 41.6 % (ref 42–52)
HEMOGLOBIN: 13.9 G/DL (ref 14–18)
INFLUENZA A BY PCR: NEGATIVE
INFLUENZA B BY PCR: NEGATIVE
LYMPHOCYTES ABSOLUTE: 1.3 K/UL (ref 1–4.8)
LYMPHOCYTES RELATIVE PERCENT: 14 %
MCH RBC QN AUTO: 24.8 PG (ref 27–31.3)
MCHC RBC AUTO-ENTMCNC: 33.5 % (ref 33–37)
MCV RBC AUTO: 74.1 FL (ref 80–100)
MICROCYTES: ABNORMAL
MONOCYTES ABSOLUTE: 0.4 K/UL (ref 0.2–0.8)
MONOCYTES RELATIVE PERCENT: 3.8 %
NEUTROPHILS ABSOLUTE: 7.4 K/UL (ref 1.4–6.5)
NEUTROPHILS RELATIVE PERCENT: 74 %
PDW BLD-RTO: 16 % (ref 11.5–14.5)
PLATELET # BLD: 219 K/UL (ref 130–400)
PLATELET SLIDE REVIEW: NORMAL
POTASSIUM SERPL-SCNC: 4.1 MEQ/L (ref 3.4–4.9)
RBC # BLD: 5.61 M/UL (ref 4.7–6.1)
SLIDE REVIEW: ABNORMAL
SODIUM BLD-SCNC: 134 MEQ/L (ref 135–144)
TOTAL PROTEIN: 7 G/DL (ref 6.3–8)
WBC # BLD: 9.2 K/UL (ref 4.8–10.8)

## 2020-03-17 PROCEDURE — 71045 X-RAY EXAM CHEST 1 VIEW: CPT

## 2020-03-17 PROCEDURE — 85025 COMPLETE CBC W/AUTO DIFF WBC: CPT

## 2020-03-17 PROCEDURE — 87040 BLOOD CULTURE FOR BACTERIA: CPT

## 2020-03-17 PROCEDURE — 99283 EMERGENCY DEPT VISIT LOW MDM: CPT

## 2020-03-17 PROCEDURE — 87502 INFLUENZA DNA AMP PROBE: CPT

## 2020-03-17 PROCEDURE — 94640 AIRWAY INHALATION TREATMENT: CPT

## 2020-03-17 PROCEDURE — 36415 COLL VENOUS BLD VENIPUNCTURE: CPT

## 2020-03-17 PROCEDURE — 2580000003 HC RX 258: Performed by: PHYSICIAN ASSISTANT

## 2020-03-17 PROCEDURE — 80053 COMPREHEN METABOLIC PANEL: CPT

## 2020-03-17 PROCEDURE — 6370000000 HC RX 637 (ALT 250 FOR IP): Performed by: PHYSICIAN ASSISTANT

## 2020-03-17 RX ORDER — INSULIN LISPRO 100 [IU]/ML
INJECTION, SOLUTION INTRAVENOUS; SUBCUTANEOUS
Qty: 15 ML | Refills: 1 | Status: SHIPPED | OUTPATIENT
Start: 2020-03-17 | End: 2020-04-28

## 2020-03-17 RX ORDER — PREDNISONE 20 MG/1
20 TABLET ORAL DAILY
Qty: 5 TABLET | Refills: 0 | Status: SHIPPED | OUTPATIENT
Start: 2020-03-17 | End: 2020-03-22

## 2020-03-17 RX ORDER — 0.9 % SODIUM CHLORIDE 0.9 %
1000 INTRAVENOUS SOLUTION INTRAVENOUS ONCE
Status: COMPLETED | OUTPATIENT
Start: 2020-03-17 | End: 2020-03-17

## 2020-03-17 RX ORDER — AZITHROMYCIN 250 MG/1
TABLET, FILM COATED ORAL
Qty: 1 PACKET | Refills: 0 | Status: SHIPPED | OUTPATIENT
Start: 2020-03-17 | End: 2020-03-27

## 2020-03-17 RX ORDER — IPRATROPIUM BROMIDE AND ALBUTEROL SULFATE 2.5; .5 MG/3ML; MG/3ML
1 SOLUTION RESPIRATORY (INHALATION)
Status: DISCONTINUED | OUTPATIENT
Start: 2020-03-17 | End: 2020-03-17 | Stop reason: HOSPADM

## 2020-03-17 RX ORDER — INSULIN GLARGINE 100 [IU]/ML
INJECTION, SOLUTION SUBCUTANEOUS
Qty: 90 ML | Refills: 1 | Status: SHIPPED | OUTPATIENT
Start: 2020-03-17 | End: 2020-07-31

## 2020-03-17 RX ADMIN — SODIUM CHLORIDE 1000 ML: 9 INJECTION, SOLUTION INTRAVENOUS at 17:27

## 2020-03-17 RX ADMIN — IPRATROPIUM BROMIDE AND ALBUTEROL SULFATE 1 AMPULE: .5; 3 SOLUTION RESPIRATORY (INHALATION) at 15:54

## 2020-03-17 RX ADMIN — INSULIN HUMAN 12 UNITS: 100 INJECTION, SOLUTION PARENTERAL at 17:27

## 2020-03-17 ASSESSMENT — ENCOUNTER SYMPTOMS
RHINORRHEA: 0
COUGH: 1
SHORTNESS OF BREATH: 0
ABDOMINAL PAIN: 0
STRIDOR: 0
WHEEZING: 0
COLOR CHANGE: 0
ABDOMINAL DISTENTION: 0
EYE DISCHARGE: 0
SORE THROAT: 0
CONSTIPATION: 0
DIARRHEA: 0
NAUSEA: 0
BACK PAIN: 0
VOMITING: 0

## 2020-03-17 ASSESSMENT — PAIN DESCRIPTION - PAIN TYPE: TYPE: ACUTE PAIN

## 2020-03-17 ASSESSMENT — PAIN SCALES - GENERAL: PAINLEVEL_OUTOF10: 1

## 2020-03-17 ASSESSMENT — PAIN DESCRIPTION - DESCRIPTORS: DESCRIPTORS: ACHING

## 2020-03-17 ASSESSMENT — PAIN DESCRIPTION - LOCATION: LOCATION: HEAD

## 2020-03-17 NOTE — TELEPHONE ENCOUNTER
Pt advised to go to ER Per Dr. Ritu Santillan due to symptoms of cough , sob, headaches, night sweats, and being a .  Pt also has history of COPD,  Afib, pt going ASAP

## 2020-03-17 NOTE — ED PROVIDER NOTES
[address] [facility] ED  eMERGENCY dEPARTMENT eNCOUnter      Pt Name: Violeta Case  MRN: [id number]  [place] 1960  Date of evaluation: 3/17/2020  Provider: Jason Pierce PA-C    CHIEF COMPLAINT       Chief Complaint   Patient presents with    Cough     x 2 weeks, SOB, HA, chills, sweats (hasn't taken temp). Dr Elizabeth Trinidad told him to come to ER to be tested for Coronavirus         HISTORY OF PRESENT ILLNESS   (Location/Symptom, Timing/Onset,Context/Setting, Quality, Duration, Modifying Factors, Severity)  Note limiting factors. Violeta Case is a 64 y.o. male who presents to the emergency department with complaint of cough, which patient states been ongoing for the last 2 weeks. He states he contacted his regular family doctor today they advised him to come to the emerge department to be evaluated, as well as testing for the coronavirus. Denies any fevers, no recent travel, denies any recent contact with anybody with known coronavirus. No nausea vomiting dizziness, no chest pain, non-productive cough. States no pain. 0-10    HPI    NursingNotes were reviewed. REVIEW OF SYSTEMS    (2-9 systems for level 4, 10 or more for level 5)     Review of Systems   Constitutional: Negative for activity change, appetite change, chills, fatigue and fever. HENT: Negative for congestion, ear discharge, ear pain, nosebleeds, rhinorrhea and sore throat. Eyes: Negative for discharge. Respiratory: Positive for cough. Negative for shortness of breath, wheezing and stridor. Cardiovascular: Negative for chest pain, palpitations and leg swelling. Gastrointestinal: Negative for abdominal distention, abdominal pain, constipation, diarrhea, nausea and vomiting. Genitourinary: Negative for difficulty urinating, dysuria and flank pain. Musculoskeletal: Negative for arthralgias, back pain and gait problem. Skin: Negative for color change, pallor, rash and wound.    Neurological: Negative for dizziness, insecurity     Worry: Not on file     Inability: Not on file    Transportation needs     Medical: Not on file     Non-medical: Not on file   Tobacco Use    Smoking status: Former Smoker     Packs/day: 1.00     Years: 15.00     Pack years: 15.00     Last attempt to quit: 1998     Years since quittin.2    Smokeless tobacco: Never Used    Tobacco comment: QUIT    Substance and Sexual Activity    Alcohol use: Yes     Comment: OCCASIONAL - not for past 2 yrs    Drug use: No    Sexual activity: Yes     Partners: Female   Lifestyle    Physical activity     Days per week: Not on file     Minutes per session: Not on file    Stress: Not on file   Relationships    Social connections     Talks on phone: Not on file     Gets together: Not on file     Attends Denominational service: Not on file     Active member of club or organization: Not on file     Attends meetings of clubs or organizations: Not on file     Relationship status: Not on file    Intimate partner violence     Fear of current or ex partner: Not on file     Emotionally abused: Not on file     Physically abused: Not on file     Forced sexual activity: Not on file   Other Topics Concern    Not on file   Social History Narrative    Not on file       SCREENINGS      @[facility]([id number])@      PHYSICAL EXAM    (up to 7 for level 4, 8 or more for level 5)     ED Triage Vitals [20 1519]   BP Temp Temp Source Pulse Resp SpO2 Height Weight   (!) 166/88 98.4 °F (36.9 °C) Oral 83 18 94 % 6' 3\" (1.905 m) (!) 350 lb (158.8 kg)       Physical Exam  Constitutional:       General: He is not in acute distress. Appearance: He is well-developed. He is not ill-appearing, toxic-appearing or diaphoretic. HENT:      Head: Normocephalic.       Right Ear: Tympanic membrane normal.      Left Ear: Tympanic membrane normal.      Nose: Nose normal.      Mouth/Throat:      Mouth: Mucous membranes are moist.   Eyes:      Pupils: Pupils are equal, round, and reactive to light.   Neck:      Musculoskeletal: Neck supple. Vascular: No JVD. Trachea: No tracheal deviation. Cardiovascular:      Rate and Rhythm: Normal rate. Pulmonary:      Effort: Pulmonary effort is normal. No respiratory distress. Breath sounds: No wheezing or rales. Comments: Lung sounds are mildly diminished in all fields, no wheeze rales or rhonchi heard, no accessory muscle use, no retractions, room air saturations are 94%. Chest:      Chest wall: No tenderness. Abdominal:      General: There is no distension. Palpations: There is no mass. Tenderness: There is no abdominal tenderness. There is no right CVA tenderness, left CVA tenderness, guarding or rebound. Musculoskeletal: Normal range of motion. General: No deformity. Skin:     General: Skin is warm. Neurological:      General: No focal deficit present. Mental Status: He is alert and oriented to person, place, and time. Coordination: Coordination normal.   Psychiatric:         Mood and Affect: Mood normal.         DIAGNOSTIC RESULTS     EKG: All EKG's are interpreted by the Emergency Department Physician who either signs or Co-signsthis chart in the absence of a cardiologist.        RADIOLOGY:   [facility] such as CT, Ultrasound and MRI are read by the radiologist. Fercho Viveros radiographic images are visualized and preliminarily interpreted by the emergency physician with the below findings:    Chest x-ray shows no acute pulmonary findings.     Interpretation per the Radiologist below, if available at the time ofthis note:    XR CHEST PORTABLE   Final Result   NO RADIOGRAPHIC FINDINGS OF ACUTE CARDIOPULMONARY DISEASE ON PORTABLE PLAIN FILM            ED BEDSIDE ULTRASOUND:   Performed by ED Physician - none    LABS:  Labs Reviewed   COMPREHENSIVE METABOLIC PANEL - Abnormal; Notable for the following components:       Result Value    Sodium 134 (*)     Glucose 375 (*)     All other components within normal limits   CBC WITH AUTO DIFFERENTIAL - Abnormal; Notable for the following components:    Hemoglobin 13.9 (*)     Hematocrit 41.6 (*)     MCV 74.1 (*)     MCH 24.8 (*)     RDW 16.0 (*)     Neutrophils Absolute 7.4 (*)     All other components within normal limits   RAPID INFLUENZA A/B ANTIGENS   CULTURE, BLOOD 2   CULTURE, BLOOD 1       All other labs were within normal range or not returned as of this dictation. EMERGENCY DEPARTMENT COURSE and DIFFERENTIAL DIAGNOSIS/MDM:   Vitals:    Vitals:    03/17/20 1519 03/17/20 1801   BP: (!) 166/88 (!) 152/88   Pulse: 83 82   Resp: 18 18   Temp: 98.4 °F (36.9 °C)    TempSrc: Oral    SpO2: 94% 95%   Weight: (!) 350 lb (158.8 kg)    Height: 6' 3\" (1.905 m)           MDM  Number of Diagnoses or Management Options  Chronic obstructive pulmonary disease, unspecified COPD type ([facility]):   Cough:   Diagnosis management comments: Patient presents to the emergency department with complaint of cough which she states ongoing x1 week, he denies any chest pain, no shortness of breath no nausea vomiting dizziness, no fevers. On patient's arrival, he was given a respiratory treatment, he was given 125 mg of Solu-Medrol. He states after these treatments were completed he is feeling much better at this time, chest x-ray shows no acute pulmonary findings, patient is negative for the flu. White count is normal at this time. Patient symptoms are most consistent with that of either viral illness, or concerns for COPD exacerbation. He was given a prescription for prednisone though his blood sugar was elevated and I advised him to monitor the this very closely, and discontinue the prednisone if his sugars continue to elevate. He was also given a prescription for Zithromax. He was advised if he has any worsening or changes symptoms return to the ER, otherwise follow-up with regular family physician in the next 3 days.       CRITICAL CARE TIME   Total Critical Care time was 0 minutes,

## 2020-03-17 NOTE — ED TRIAGE NOTES
Pt was sent over from dr office d/t cough for the last 2 weeks. Pt denies fever or nvd.  Lungs are clear and diminished bilateral,

## 2020-03-18 ENCOUNTER — TELEPHONE (OUTPATIENT)
Dept: FAMILY MEDICINE CLINIC | Age: 60
End: 2020-03-18

## 2020-03-18 NOTE — TELEPHONE ENCOUNTER
[facility] ([facility]) ED Follow up Call    Reason for ED visit:  COPD exacerbation         Herman Leach , this is Alyssia Hannah from Dr. Agata Reyes office, just calling to see how you are doing after your recent ED visit. Did you receive discharge instructions? Yes  Do you understand the discharge instructions? Yes  Did the ED give you any new prescriptions? Yes  Were you able to fill your prescriptions? Yes      Do you have one of our red, yellow and green  Zone sheets that help you to determine when you should go to the ED? No      Do you need or want to make a follow up appt with your PCP? No - PATIENT STATES HE FEELS GOOD BUT  WILL CALL BACK LATER TO SCHEDULE FOLLOW-UP     Do you have any further needs in the home i.e. Equipment?   No        FU appts/Provider:    Future Appointments   Date Time Provider Janes Isaacs   6/5/2020  4:15 PM Morales Abreu  [facility]   7/20/2020  4:00 PM Patrice Gross  [place]

## 2020-03-22 LAB
BLOOD CULTURE, ROUTINE: NORMAL
CULTURE, BLOOD 2: NORMAL

## 2020-03-27 ENCOUNTER — VIRTUAL VISIT (OUTPATIENT)
Dept: FAMILY MEDICINE CLINIC | Age: 60
End: 2020-03-27
Payer: COMMERCIAL

## 2020-03-27 PROCEDURE — 99442 PR PHYS/QHP TELEPHONE EVALUATION 11-20 MIN: CPT | Performed by: FAMILY MEDICINE

## 2020-03-27 ASSESSMENT — ENCOUNTER SYMPTOMS
WHEEZING: 0
ABDOMINAL PAIN: 0
SHORTNESS OF BREATH: 0

## 2020-03-27 NOTE — LETTER
[facility]  [id number]  [id number]  Phone: [phone]  Fax: [phone]    Elva Grady MD        March 27, 2020     Patient: Arpita Benitez   YOB: 1960   Date of Visit: 3/27/2020       To Whom It May Concern:      Delfin Brigida was seen in my office today on 3-27-20. It is my medical opinion that Delfin Allred    may return to full duty immediately with no restrictions on Aril 1,2020. If you have any questions or concerns, please don't hesitate to call.     Sincerely,        Elva Grady MD

## 2020-03-27 NOTE — PROGRESS NOTES
Subjective:      Patient ID: Alix Hermosillo is a 64 y.o. male    HPI  Here in follow up from recent er visit visit for cough and wheezing. Did have fever prior to er visit. Finished prednisone and antibiotics. Doing much better. No sob. Breathing back to normal.  No fever. Review of Systems   Constitutional: Negative for activity change, appetite change and chills. Respiratory: Negative for shortness of breath and wheezing. Cardiovascular: Negative for chest pain. Gastrointestinal: Negative for abdominal pain. Skin: Negative for rash. Neurological: Negative for weakness. Reviewed allergy, medical, social, surgical, family and med list changes and updated   Files   patient accepts tele health tele phone  visit and associated charges  Visit about 15 min   Alix Hermosillo is a 64 y.o. male evaluated via telephone on 3/27/2020. Consent:  He and/or health care decision maker is aware that that he may receive a bill for this telephone service, depending on his insurance coverage, and has provided verbal consent to proceed: Yes      Documentation:  I communicated with the patient and/or health care decision maker . Details of this discussion including any medical advice provided:     I affirm this is a Patient Initiated Episode with an Established Patient who has not had a related appointment within my department in the past 7 days or scheduled within the next 24 hours.     Total Time: minutes: 11-20 minutes    Note: not billable if this call serves to triage the patient into an appointment for the relevant concern      aDkota Shows   Social History     Socioeconomic History    Marital status: Single     Spouse name: Not on file    Number of children: Not on file    Years of education: Not on file    Highest education level: Not on file   Occupational History    Not on file   Social Needs    Financial resource strain: Not on file    Food insecurity     Worry: Not on file USE 4 TIMES DAILY 200 each 3    VENTOLIN  (90 Base) MCG/ACT inhaler INHALE TWO PUFFS BY MOUTH EVERY 6 HOURS AS NEEDED for wheezing or shortness of breath 18 g 0    budesonide-formoterol (SYMBICORT) 160-4.5 MCG/ACT AERO Inhale 2 puffs into the lungs 2 times daily 1 Inhaler 3    metFORMIN (GLUCOPHAGE-XR) 500 MG extended release tablet TAKE FOUR (4) TABLETS BY MOUTH EVERY  tablet 5    atorvastatin (LIPITOR) 80 MG tablet take one tablet by mouth nightly 90 tablet 3    insulin lispro, 1 Unit Dial, (HUMALOG KWIKPEN) 100 UNIT/ML SOPN INJECT 24 UNITS INTO THE SKIN THREE TIMES DAILY BEFORE MEALS 15 mL 2    isosorbide mononitrate (IMDUR) 30 MG extended release tablet TAKE ONE TABLET BY MOUTH EVERY DAY 90 tablet 3    XARELTO 20 MG TABS tablet TAKE ONE TABLET BY MOUTH DAILY WITH BREAKFAST 90 tablet 3    losartan-hydrochlorothiazide (HYZAAR) 100-25 MG per tablet TAKE 1 TABLET BY MOUTH  DAILY 90 tablet 1    spironolactone (ALDACTONE) 50 MG tablet TAKE ONE TABLET BY MOUTH EVERY DAY 90 tablet 3    metoprolol succinate (TOPROL XL) 100 MG extended release tablet TAKE ONE TABLET BY MOUTH EVERY DAY 90 tablet 3    Insulin Pen Needle (NOVOFINE) 32G X 6 MM MISC Qid 300 each 3    glucose blood VI test strips (ONE TOUCH ULTRA TEST) strip Checks 3 times daily. IDDM--ICD-10 E11.9 300 strip 1    aspirin 81 MG tablet Take 81 mg by mouth daily. No current facility-administered medications for this visit.       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 Physical Exam  No exam done   Assessment:       Diagnosis Orders   1. COPD exacerbation ([facility]     2.  Acute bronchitis, unspecified organism           Plan:        Return to work no restrictions-beginning April 1  Patient to call us back with fax number for note  F/u in July after fasting blood work

## 2020-04-28 RX ORDER — INSULIN LISPRO 100 [IU]/ML
INJECTION, SOLUTION INTRAVENOUS; SUBCUTANEOUS
Qty: 15 ML | Refills: 3 | Status: SHIPPED | OUTPATIENT
Start: 2020-04-28 | End: 2020-07-28

## 2020-05-01 ENCOUNTER — PATIENT MESSAGE (OUTPATIENT)
Dept: FAMILY MEDICINE CLINIC | Age: 60
End: 2020-05-01

## 2020-05-01 NOTE — TELEPHONE ENCOUNTER
From: Destiney Trujillo  To: Rachelle Ace MD  Sent: 5/1/2020 10:33 AM EDT  Subject: Non-Urgent Medical Question    Dr.. Cyrus Orona I went back to work this past tues it was rough hard time breathing. ..wed I made it a 1/2 day. ..thurs and today I called off . iI started coughing again seems when i sit and relax its not bad . .but when i try to do anything I get winded or start coughing or visa versa. the bosses at work dont think I can do that job anymore and I think I agree with them. they say I should try for disability what you think? Cyrus Orona . talk to ya chet Garcia

## 2020-05-12 ENCOUNTER — VIRTUAL VISIT (OUTPATIENT)
Dept: CARDIOLOGY CLINIC | Age: 60
End: 2020-05-12
Payer: COMMERCIAL

## 2020-05-12 PROCEDURE — 99214 OFFICE O/P EST MOD 30 MIN: CPT | Performed by: INTERNAL MEDICINE

## 2020-05-12 NOTE — PROGRESS NOTES
truck. He is on Xarelto which has been resumed. Has hypertension which is well-controlled. See me in 3 months. He can return to work with no restrictions.        1/2/2018: Patient presents today for Follow-up of recent evaluations the emergency room for chest pain. Last evaluation was at [facility]. That he was in atrial fibrillation. He was transferred to [facility]. Troponins were mildly elevated. He converted to sinus rhythm. He has multiple risk factors for coronary artery disease. We will schedule him to have a left heart catheterization and coronary angiography on Thursday at [facility]. Dr. Fidelia Ordonez backup. Hold the Xarelto tomorrow and Thursday. Hold her diabetes pills on Thursday.     10/11/2017: Patient presents today for follow-up of hypertension. He is a . In December it 24 years since his pacemaker. RSV obese. Still drives a truck. He is diabetic and is uncontrolled. No angina congestive heart failure symptoms of syncope. See me in 6 months.     4/12/201: For follow-up of hypertension. He is a . Had a pacemaker by [employer]. Blood pressure is well-controlled. Gained a lot of weight. He needs to lose that weight see me in 6 months.     10/12/16: *For fu of dizziness,On toprol 100. Norvasc DCd. Aldactone working. K 3.7. Return to work on [employer]. See in 3M     9/21/2016: For fu of CAD. Still has mild CP. Retrosternal.No NV. Heaviness. . Diabetic and hypertensive. Obese. EKG showed SR. S/P PPM.H/O AF. Will increase toprol tp 100. Start aldactone 50. DC norvasc. BMP in 2 weeks. See me in 3 weeks. Cant work till then.      10/27/2015: Stress test OK. Echo OK. See in 6M. . S/P PPM by Darren Marcus  09/14/2015: Seen in the [facility] ER with CP. Had cathby OI. Mild disease. Sounds like angina. Had a PPM by Alberto Ross. Set for lexiscan and echo. And see me.         Past Medical History:   Diagnosis Date    Angina pectoris ([facility] [address] 10/12/2016    Atrial fibrillation (HCC)     Benign neoplasm of patient is not nervous/anxious. All other systems reviewed and are negative. Review of System is negative except for as mentioned above. Physical Examination:    There were no vitals taken for this visit. Physical Exam   Constitutional: He appears healthy. No distress. HENT:   Nose: Nose normal.   Mouth/Throat: Dentition is normal. Oropharynx is clear. Eyes: Pupils are equal, round, and reactive to light. Conjunctivae are normal.   Neck: Normal range of motion and thyroid normal. Neck supple. Cardiovascular: Regular rhythm, S1 normal, S2 normal, normal heart sounds, intact distal pulses and normal pulses. PMI is not displaced. No murmur heard. Pulmonary/Chest: He has no wheezes. He has no rales. He exhibits no tenderness. Abdominal: Soft. Bowel sounds are normal. He exhibits no distension and no mass. There is no splenomegaly or hepatomegaly. There is no abdominal tenderness. No hernia. Neurological: He is alert and oriented to person, place, and time. He has normal motor skills. Gait normal.   Skin: Skin is warm and dry. No cyanosis. No jaundice. Nails show no clubbing. Patient Active Problem List   Diagnosis    Hypertension    DM (diabetes mellitus) ([facility] [address]    BMI 40.0-44.9, adult ([facility] [address]    History of tobacco abuse    S/P cardiac pacemaker procedure    Abnormal nuclear stress test    Phlebitis    CAD (coronary artery disease)    Dizziness    Heart palpitations    History of non-ST elevation myocardial infarction (NSTEMI)    A-fib (HCC)    SVT [facility] tachycardia) ([facility]    Chronic obstructive pulmonary disease (HCC)           No orders of the defined types were placed in this encounter. No orders of the defined types were placed in this encounter. Assessment:    1. Essential hypertension    2. Paroxysmal atrial fibrillation (HCC)    3. History of non-ST elevation myocardial infarction (NSTEMI)    4.  S/P cardiac pacemaker procedure       Plan:

## 2020-05-19 ENCOUNTER — TELEPHONE (OUTPATIENT)
Dept: FAMILY MEDICINE CLINIC | Age: 60
End: 2020-05-19

## 2020-05-21 ENCOUNTER — OFFICE VISIT (OUTPATIENT)
Dept: PULMONOLOGY | Age: 60
End: 2020-05-21
Payer: COMMERCIAL

## 2020-05-21 VITALS
BODY MASS INDEX: 39.17 KG/M2 | OXYGEN SATURATION: 98 % | HEART RATE: 76 BPM | TEMPERATURE: 98.6 F | SYSTOLIC BLOOD PRESSURE: 136 MMHG | HEIGHT: 75 IN | RESPIRATION RATE: 16 BRPM | DIASTOLIC BLOOD PRESSURE: 72 MMHG | WEIGHT: 315 LBS

## 2020-05-21 PROCEDURE — 1036F TOBACCO NON-USER: CPT | Performed by: INTERNAL MEDICINE

## 2020-05-21 PROCEDURE — 99204 OFFICE O/P NEW MOD 45 MIN: CPT | Performed by: INTERNAL MEDICINE

## 2020-05-21 PROCEDURE — G8427 DOCREV CUR MEDS BY ELIG CLIN: HCPCS | Performed by: INTERNAL MEDICINE

## 2020-05-21 PROCEDURE — G8417 CALC BMI ABV UP PARAM F/U: HCPCS | Performed by: INTERNAL MEDICINE

## 2020-05-21 PROCEDURE — 3017F COLORECTAL CA SCREEN DOC REV: CPT | Performed by: INTERNAL MEDICINE

## 2020-05-21 RX ORDER — OMEPRAZOLE 20 MG/1
20 CAPSULE, DELAYED RELEASE ORAL DAILY
Qty: 30 CAPSULE | Refills: 3 | Status: SHIPPED | OUTPATIENT
Start: 2020-05-21 | End: 2020-10-05 | Stop reason: SDUPTHER

## 2020-05-21 ASSESSMENT — ENCOUNTER SYMPTOMS
WHEEZING: 0
VOMITING: 0
SINUS PRESSURE: 0
SORE THROAT: 1
NAUSEA: 0
ABDOMINAL PAIN: 0
SHORTNESS OF BREATH: 1
CHEST TIGHTNESS: 0
COUGH: 1
DIARRHEA: 0
RHINORRHEA: 0

## 2020-05-21 NOTE — PROGRESS NOTES
Musculoskeletal: Normal range of motion and neck supple. Thyroid: No thyromegaly. Vascular: No JVD. Trachea: No tracheal deviation. Cardiovascular:      Rate and Rhythm: Normal rate and regular rhythm. Heart sounds: No murmur. No gallop. Pulmonary:      Effort: Pulmonary effort is normal.      Breath sounds: Normal breath sounds. No wheezing or rales. Chest:      Chest wall: No tenderness. Abdominal:      General: There is no distension. Musculoskeletal: Normal range of motion. Skin:     General: Skin is warm and dry. Findings: No rash. Neurological:      Mental Status: He is alert and oriented to person, place, and time. Deep Tendon Reflexes: Reflexes are normal and symmetric. Assessment:      Diagnosis Orders   1. Moderate persistent asthma without complication     2. OLIVER (obstructive sleep apnea)  Baseline Diagnostic Sleep Study   3. Gastroesophageal reflux disease, esophagitis presence not specified     4. Shortness of breath  ECHO Complete 2D W Doppler W Color     Had long discussion with patient about the high risk of having obstructive sleep apnea as a cause of his severe nocturnal bradycardia, chronic cough, and possibly combination of airway reactivity and pulmonary hypertension contributing to his progressive dyspnea. Underlying nocturnal GERD as a contributing factor to his chronic cough was discussed and a trial of treatment with PPI was recommended and initiated today. I will also obtain a baseline sleep study, echocardiogram to evaluate right heart function and the possibility of pulmonary hypertension.   And I will see him for follow-up in 6 weeks      Plan:     Orders Placed This Encounter   Procedures    ECHO Complete 2D W Doppler W Color     Standing Status:   Future     Standing Expiration Date:   6/21/2020     Order Specific Question:   Reason for exam:     Answer:   Shortness of breath    Baseline Diagnostic Sleep Study     Standing Status:   Future     Standing Expiration Date:   6/21/2020     Order Specific Question:   Adult or Pediatric     Answer:   Adult Study (>7 Years)     Orders Placed This Encounter   Medications    omeprazole (PRILOSEC) 20 MG delayed release capsule     Sig: Take 1 capsule by mouth Daily     Dispense:  30 capsule     Refill:  3           Return in about 6 weeks (around 7/2/2020) for after sleep study, After echo.       Eddie Ugarte MD

## 2020-06-02 RX ORDER — INSULIN GLARGINE 100 [IU]/ML
INJECTION, SOLUTION SUBCUTANEOUS
Qty: 35 PEN | Refills: 1 | Status: SHIPPED | OUTPATIENT
Start: 2020-06-02 | End: 2020-08-06 | Stop reason: SDUPTHER

## 2020-06-04 ENCOUNTER — VIRTUAL VISIT (OUTPATIENT)
Dept: CARDIOLOGY CLINIC | Age: 60
End: 2020-06-04
Payer: COMMERCIAL

## 2020-06-04 PROCEDURE — 99213 OFFICE O/P EST LOW 20 MIN: CPT | Performed by: INTERNAL MEDICINE

## 2020-06-04 NOTE — PROGRESS NOTES
6/4/2020  Yoselin Lee is a 64 y.o. male evaluated via telephone on 6/4/2020. Middle-aged white male with a . Longstanding cardiac history. History of pacemaker morbid obesity proximal atrial fibrillation COPD. Sleep apnea. Gets short of breath on minimal activity. Near syncope. I have advised him to apply for disability. I do not think he is able to keep any meaningful job for any reasonable. Of time. Consent:  He and/or health care decision maker is aware that that he may receive a bill for this telephone service, depending on his insurance coverage, and has provided verbal consent to proceed: Yes      Documentation:  I communicated with the patient and/or health care decision maker about telephone call visit. Details of this discussion including any medical advice provided: Dr. Javier Tijerina      I affirm this is a Patient Initiated Episode with a Patient who has not had a related appointment within my department in the past 7 days or scheduled within the next 24 hours. Patient identification was verified at the start of the visit: Yes    Total Time: minutes: 21-30 minutes    Note: not billable if this call serves to triage the patient into an appointment for the relevant concern      Yesi Bautista is a 64 y.o. male evaluated via telephone on 6/4/2020. He has history of atrial fibrillation with rapid ventricular rate. Morbidly obese. Gets short of breath on minimal exertion. Is a diabetic and hypertensive and dyslipidemia. 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 This was at [facility]. I have advised him to return to work. He needs to see a pulmonologist.  May have to apply for disability.   He will see me in 3 weeks at which time we will decide          Consent:  He and/or health care decision maker is aware that that he may receive a bill for this telephone service, depending on his insurance

## 2020-06-09 ENCOUNTER — TELEPHONE (OUTPATIENT)
Dept: CARDIOLOGY CLINIC | Age: 60
End: 2020-06-09

## 2020-06-09 NOTE — TELEPHONE ENCOUNTER
JANEY: PLEASE CALL PT.--YOU FAXED PT. FMLA PAPERWORK BUT NOT THE SHORT TERM DISABILITY PAPERWORK. ..  PLEASE CALL ASAP

## 2020-06-10 ENCOUNTER — HOSPITAL ENCOUNTER (OUTPATIENT)
Dept: NON INVASIVE DIAGNOSTICS | Age: 60
Discharge: HOME | End: 2020-06-10
Payer: COMMERCIAL

## 2020-06-10 LAB
LV EF: 55 %
LVEF MODALITY: NORMAL

## 2020-06-10 PROCEDURE — 93306 TTE W/DOPPLER COMPLETE: CPT

## 2020-06-15 RX ORDER — SPIRONOLACTONE 50 MG/1
TABLET, FILM COATED ORAL
Qty: 90 TABLET | Refills: 1 | Status: SHIPPED | OUTPATIENT
Start: 2020-06-15 | End: 2020-12-02

## 2020-06-16 RX ORDER — METFORMIN HYDROCHLORIDE 500 MG/1
TABLET, EXTENDED RELEASE ORAL
Qty: 120 TABLET | Refills: 0 | Status: SHIPPED | OUTPATIENT
Start: 2020-06-16 | End: 2020-08-06 | Stop reason: SDUPTHER

## 2020-07-06 ENCOUNTER — OFFICE VISIT (OUTPATIENT)
Dept: PULMONOLOGY | Age: 60
End: 2020-07-06
Payer: COMMERCIAL

## 2020-07-06 VITALS
DIASTOLIC BLOOD PRESSURE: 80 MMHG | SYSTOLIC BLOOD PRESSURE: 164 MMHG | RESPIRATION RATE: 16 BRPM | OXYGEN SATURATION: 98 % | BODY MASS INDEX: 39.17 KG/M2 | HEART RATE: 91 BPM | TEMPERATURE: 98.4 F | HEIGHT: 75 IN | WEIGHT: 315 LBS

## 2020-07-06 PROCEDURE — 1036F TOBACCO NON-USER: CPT | Performed by: INTERNAL MEDICINE

## 2020-07-06 PROCEDURE — 3017F COLORECTAL CA SCREEN DOC REV: CPT | Performed by: INTERNAL MEDICINE

## 2020-07-06 PROCEDURE — G8427 DOCREV CUR MEDS BY ELIG CLIN: HCPCS | Performed by: INTERNAL MEDICINE

## 2020-07-06 PROCEDURE — G8417 CALC BMI ABV UP PARAM F/U: HCPCS | Performed by: INTERNAL MEDICINE

## 2020-07-06 PROCEDURE — 99214 OFFICE O/P EST MOD 30 MIN: CPT | Performed by: INTERNAL MEDICINE

## 2020-07-06 ASSESSMENT — ENCOUNTER SYMPTOMS
RHINORRHEA: 0
ABDOMINAL PAIN: 0
CHEST TIGHTNESS: 0
SINUS PRESSURE: 0
VOMITING: 0
WHEEZING: 1
NAUSEA: 0
SHORTNESS OF BREATH: 1
DIARRHEA: 0
SORE THROAT: 0
COUGH: 1

## 2020-07-06 NOTE — PROGRESS NOTES
PACEMAKER PLACEMENT      DE ARTHROSCOPY SHOULDER SURGICAL BICEPS TENODESIS Right 10/26/2018    RIGHT  SHOULDER ARTHROSCOPY SUBACROMIAL DECOMPRESSION performed by Mayra Emery MD at [address] History   Problem Relation Age of Onset    Cancer Mother         LUNG    Diabetes Father      Social History     Socioeconomic History    Marital status: Single     Spouse name: Not on file    Number of children: Not on file    Years of education: Not on file    Highest education level: Not on file   Occupational History    Not on file   Social Needs    Financial resource strain: Not on file    Food insecurity     Worry: Not on file     Inability: Not on file    Transportation needs     Medical: Not on file     Non-medical: Not on file   Tobacco Use    Smoking status: Former Smoker     Packs/day: 1.00     Years: 15.00     Pack years: 15.00     Last attempt to quit: 1998     Years since quittin.5    Smokeless tobacco: Never Used    Tobacco comment: QUIT    Substance and Sexual Activity    Alcohol use: Yes     Comment: OCCASIONAL - not for past 2 yrs    Drug use: No    Sexual activity: Yes     Partners: Female   Lifestyle    Physical activity     Days per week: Not on file     Minutes per session: Not on file    Stress: Not on file   Relationships    Social connections     Talks on phone: Not on file     Gets together: Not on file     Attends Yarsanism service: Not on file     Active member of club or organization: Not on file     Attends meetings of clubs or organizations: Not on file     Relationship status: Not on file    Intimate partner violence     Fear of current or ex partner: Not on file     Emotionally abused: Not on file     Physically abused: Not on file     Forced sexual activity: Not on file   Other Topics Concern    Not on file   Social History Narrative    Not on file         Review of Systems   Constitutional: Positive for fatigue.  Negative for chills, diaphoresis and fever. HENT: Negative for congestion, postnasal drip, rhinorrhea, sinus pressure, sneezing and sore throat. Eyes: Negative for visual disturbance. Respiratory: Positive for cough, shortness of breath and wheezing. Negative for chest tightness. Cardiovascular: Positive for leg swelling. Negative for chest pain and palpitations. Gastrointestinal: Negative for abdominal pain, diarrhea, nausea and vomiting. Genitourinary: Negative for difficulty urinating and hematuria. Musculoskeletal: Negative for arthralgias, joint swelling and myalgias. Skin: Negative for rash. Allergic/Immunologic: Negative for environmental allergies. Neurological: Positive for weakness. Negative for dizziness, tremors and headaches. Psychiatric/Behavioral: Positive for sleep disturbance. Negative for behavioral problems. :     Vitals:    07/06/20 1415 07/06/20 1424   BP: (!) 166/82 (!) 164/80   Site: Right Upper Arm Left Upper Arm   Position: Sitting Sitting   Cuff Size: Large Adult Large Adult   Pulse: 91    Resp: 16    Temp: 98.4 °F (36.9 °C)    TempSrc: Tympanic    SpO2: 98%    Weight: (!) 371 lb 6.4 oz (168.5 kg)    Height: 6' 3\" (1.905 m)          Physical Exam  Constitutional:       Appearance: He is well-developed. HENT:      Head: Normocephalic and atraumatic. Eyes:      Pupils: Pupils are equal, round, and reactive to light. Neck:      Musculoskeletal: Normal range of motion and neck supple. Thyroid: No thyromegaly. Vascular: No JVD. Trachea: No tracheal deviation. Cardiovascular:      Rate and Rhythm: Normal rate and regular rhythm. Heart sounds: No murmur. No gallop. Pulmonary:      Effort: Pulmonary effort is normal.      Breath sounds: Normal breath sounds. No wheezing or rales. Chest:      Chest wall: No tenderness. Abdominal:      General: There is no distension. Musculoskeletal: Normal range of motion. General: Swelling present.    Skin:     General: Skin is warm and dry. Findings: No rash. Neurological:      Mental Status: He is alert and oriented to person, place, and time. Deep Tendon Reflexes: Reflexes are normal and symmetric. Assessment:      Diagnosis Orders   1. Moderate persistent asthma without complication     2. OLIVER (obstructive sleep apnea)     3. Gastroesophageal reflux disease, esophagitis presence not specified       Patient with active asthmatic symptoms, significant exertional limitation, high risk of exacerbations with exposure to irritants, chemicals, or other triggers of asthma. He has significant symptoms of sleep disordered breathing as well will try to get him evaluated as soon as possible with a sleep study and I will see him for follow-up after that      Plan:     No orders of the defined types were placed in this encounter. No orders of the defined types were placed in this encounter. Patient should stay off work until his next evaluation      Return in about 2 weeks (around 7/20/2020) for after sleep study.       Ankur Love MD

## 2020-07-16 RX ORDER — METOPROLOL SUCCINATE 100 MG/1
TABLET, EXTENDED RELEASE ORAL
Qty: 90 TABLET | Refills: 0 | Status: SHIPPED | OUTPATIENT
Start: 2020-07-16 | End: 2020-09-28

## 2020-07-28 RX ORDER — INSULIN LISPRO 100 [IU]/ML
INJECTION, SOLUTION INTRAVENOUS; SUBCUTANEOUS
Qty: 15 ML | Refills: 0 | Status: SHIPPED | OUTPATIENT
Start: 2020-07-28 | End: 2020-08-06 | Stop reason: SDUPTHER

## 2020-07-31 ENCOUNTER — OFFICE VISIT (OUTPATIENT)
Dept: FAMILY MEDICINE CLINIC | Age: 60
End: 2020-07-31
Payer: COMMERCIAL

## 2020-07-31 VITALS
DIASTOLIC BLOOD PRESSURE: 80 MMHG | RESPIRATION RATE: 14 BRPM | OXYGEN SATURATION: 95 % | BODY MASS INDEX: 39.17 KG/M2 | SYSTOLIC BLOOD PRESSURE: 150 MMHG | TEMPERATURE: 97.9 F | HEART RATE: 87 BPM | HEIGHT: 75 IN | WEIGHT: 315 LBS

## 2020-07-31 PROCEDURE — 99396 PREV VISIT EST AGE 40-64: CPT | Performed by: FAMILY MEDICINE

## 2020-07-31 ASSESSMENT — PATIENT HEALTH QUESTIONNAIRE - PHQ9
1. LITTLE INTEREST OR PLEASURE IN DOING THINGS: 0
2. FEELING DOWN, DEPRESSED OR HOPELESS: 0
SUM OF ALL RESPONSES TO PHQ QUESTIONS 1-9: 0
SUM OF ALL RESPONSES TO PHQ QUESTIONS 1-9: 0
SUM OF ALL RESPONSES TO PHQ9 QUESTIONS 1 & 2: 0

## 2020-07-31 NOTE — PROGRESS NOTES
Subjective:      Patient ID: Zheng Jimenez is a 64 y.o. male    HPI  Here for physical for possible discount on health insurance. No missed doses of medications. Review of Systems   Constitutional: Negative for chills and fever. Cardiovascular: Negative for chest pain. Gastrointestinal: Negative for abdominal pain, diarrhea and vomiting. Skin: Negative for rash. Neurological: Negative for weakness.      Reviewed allergy, medical, social, surgical, family and med list changes and updated   Files     Social History     Socioeconomic History    Marital status: Single     Spouse name: None    Number of children: None    Years of education: None    Highest education level: None   Occupational History    None   Social Needs    Financial resource strain: None    Food insecurity     Worry: None     Inability: None    Transportation needs     Medical: None     Non-medical: None   Tobacco Use    Smoking status: Former Smoker     Packs/day: 1.00     Years: 15.00     Pack years: 15.00     Last attempt to quit: 1998     Years since quittin.5    Smokeless tobacco: Never Used    Tobacco comment: QUIT    Substance and Sexual Activity    Alcohol use: Yes     Comment: OCCASIONAL - not for past 2 yrs    Drug use: No    Sexual activity: Yes     Partners: Female   Lifestyle    Physical activity     Days per week: None     Minutes per session: None    Stress: None   Relationships    Social connections     Talks on phone: None     Gets together: None     Attends Jain service: None     Active member of club or organization: None     Attends meetings of clubs or organizations: None     Relationship status: None    Intimate partner violence     Fear of current or ex partner: None     Emotionally abused: None     Physically abused: None     Forced sexual activity: None   Other Topics Concern    None   Social History Narrative    None     Current Outpatient Medications   Medication Sig Dispense Refill    insulin lispro, 1 Unit Dial, (HUMALOG KWIKPEN) 100 UNIT/ML SOPN inject 24 units into the skin at each meal 15 mL 0    metoprolol succinate (TOPROL XL) 100 MG extended release tablet TAKE ONE TABLET BY MOUTH EVERY DAY 90 tablet 0    metFORMIN (GLUCOPHAGE-XR) 500 MG extended release tablet TAKE FOUR (4) TABLETS BY MOUTH EVERY  tablet 0    spironolactone (ALDACTONE) 50 MG tablet TAKE ONE TABLET BY MOUTH EVERY DAY 90 tablet 1    SYMBICORT 160-4.5 MCG/ACT AERO INHALE 2 PUFFS BY MOUTH INTO THE LUNGS 2 TIMES DAILY  10.2 g 0    LANTUS SOLOSTAR 100 UNIT/ML injection pen Inject 120 units at bedtime please give 7 boxes for a 90 day 35 pen 1    hydroCHLOROthiazide (HYDRODIURIL) 25 MG tablet TAKE ONE TABLET BY MOUTH EVERY DAY IN THE MORNING 90 tablet 0    Aspirin Buf,CaCarb-MgCarb-MgO, 81 MG TABS Take by mouth      omeprazole (PRILOSEC) 20 MG delayed release capsule Take 1 capsule by mouth Daily 30 capsule 3    losartan (COZAAR) 100 MG tablet TAKE ONE TABLET BY MOUTH EVERY DAY 90 tablet 0    fluticasone (FLONASE) 50 MCG/ACT nasal spray USE 2 SPRAYS NASALLY DAILY  16 g 0    Insulin Pen Needle (NOVOFINE) 32G X 6 MM MISC USE 4 TIMES DAILY 200 each 3    VENTOLIN  (90 Base) MCG/ACT inhaler INHALE TWO PUFFS BY MOUTH EVERY 6 HOURS AS NEEDED for wheezing or shortness of breath 18 g 0    atorvastatin (LIPITOR) 80 MG tablet take one tablet by mouth nightly 90 tablet 3    insulin lispro, 1 Unit Dial, (HUMALOG KWIKPEN) 100 UNIT/ML SOPN INJECT 24 UNITS INTO THE SKIN THREE TIMES DAILY BEFORE MEALS 15 mL 2    isosorbide mononitrate (IMDUR) 30 MG extended release tablet TAKE ONE TABLET BY MOUTH EVERY DAY 90 tablet 3    XARELTO 20 MG TABS tablet TAKE ONE TABLET BY MOUTH DAILY WITH BREAKFAST 90 tablet 3    losartan-hydrochlorothiazide (HYZAAR) 100-25 MG per tablet TAKE 1 TABLET BY MOUTH  DAILY 90 tablet 1    glucose blood VI test strips (ONE TOUCH ULTRA TEST) strip Checks 3 times daily. IDDM--ICD-10 E11.9 300 strip 1    aspirin 81 MG tablet Take 81 mg by mouth daily. No current facility-administered medications for this visit. Family History   Problem Relation Age of Onset    Cancer Mother         LUNG    Diabetes Father      Past Medical History:   Diagnosis Date    Angina pectoris ([facility] [address] 10/12/2016    Atrial fibrillation ([facility] [address]     Benign neoplasm of colon 01/19/2015    DR Afia Best    Benign neoplasm of rectum and anal canal 01/19/2015    DR Afia Best    CAD (coronary artery disease)     Diverticulosis of colon (without mention of hemorrhage) 01/19/2015    DR Afia Best    Dizziness 9/21/2016    Heart palpitations 10/12/2016    History of non-ST elevation myocardial infarction (NSTEMI) 12/30/2017    Hypertension     Obesity     Type 2 diabetes mellitus without complication (HCC)     Type II or unspecified type diabetes mellitus without mention of complication, not stated as uncontrolled      Objective:   BP (!) 150/80   Pulse 87   Temp 97.9 °F (36.6 °C)   Resp 14   Ht 6' 3\" (1.905 m)   Wt (!) 350 lb (158.8 kg)   SpO2 95%   BMI 43.75 kg/m²     Physical Exam      PHYSICAL EXAMINATION:  Vital signs as recorded. GENERAL:  Alert, oriented male, well nourished, well developed with no acute distress with normal affect. HEENT:   Pupils equal and reactive to light and accommodation. Nares negative. Pharynx without erythema or exudate. TMs are clear. Sclerae and conjunctiva are also clear. No masses visible. NECK:  supple without masses, no adenopathy or bruits noted. Thyroid without any enlargements or nodularity. HEART:  RRR without murmurs, rubs or gallops. LUNGS:  clear to auscultation with normal breath sounds. No acute distress noted. Negative for rhonchi or wheezes. ABDOMEN:  soft times 4, nontender. Bowel sounds positive times 4. No guarding or rebound or bruits. No hepatosplenomegaly. No masses.      EXTREMITIES:  Trace-1+ pitting edema of both legs  No joint deformity or swelling noted either. SKIN: so suspicious skin lesions or masses noted. NEURO: No focal deficits noted at all. PULSES:  Radial 2+ and equal while D.P 1+ and equal.          Assessment:       Diagnosis Orders   1. Health maintenance examination     2. Essential hypertension     3. Mixed hyperlipidemia     4. Special screening for malignant neoplasm of prostate     5. SVT (supraventricular tachycardia) ([facility])     6. Paroxysmal atrial fibrillation ([facility] [address]     7. Coronary artery disease involving native coronary artery of native heart with angina pectoris ([facility] [address]     8.  Type 2 diabetes mellitus with other circulatory complication, with long-term current use of insulin (HCC)           Plan:      Continue current meds except increase toprol xl to 150 mg daily  Fasting blood work and f/u in 2 weeks

## 2020-08-01 ASSESSMENT — ENCOUNTER SYMPTOMS
ABDOMINAL PAIN: 0
DIARRHEA: 0
VOMITING: 0

## 2020-08-03 DIAGNOSIS — Z79.4 TYPE 2 DIABETES MELLITUS WITH OTHER CIRCULATORY COMPLICATION, WITH LONG-TERM CURRENT USE OF INSULIN (HCC): ICD-10-CM

## 2020-08-03 DIAGNOSIS — I10 ESSENTIAL HYPERTENSION: ICD-10-CM

## 2020-08-03 DIAGNOSIS — Z12.5 SPECIAL SCREENING FOR MALIGNANT NEOPLASM OF PROSTATE: ICD-10-CM

## 2020-08-03 DIAGNOSIS — E11.59 TYPE 2 DIABETES MELLITUS WITH OTHER CIRCULATORY COMPLICATION, WITH LONG-TERM CURRENT USE OF INSULIN (HCC): ICD-10-CM

## 2020-08-03 DIAGNOSIS — E78.2 MIXED HYPERLIPIDEMIA: ICD-10-CM

## 2020-08-03 LAB
ALBUMIN SERPL-MCNC: 3.8 G/DL (ref 3.5–4.6)
ALP BLD-CCNC: 77 U/L (ref 35–104)
ALT SERPL-CCNC: 19 U/L (ref 0–41)
ANION GAP SERPL CALCULATED.3IONS-SCNC: 14 MEQ/L (ref 9–15)
ANISOCYTOSIS: ABNORMAL
AST SERPL-CCNC: 15 U/L (ref 0–40)
BASOPHILS ABSOLUTE: 0.1 K/UL (ref 0–0.2)
BASOPHILS RELATIVE PERCENT: 1.2 %
BILIRUB SERPL-MCNC: 0.4 MG/DL (ref 0.2–0.7)
BUN BLDV-MCNC: 13 MG/DL (ref 6–20)
CALCIUM SERPL-MCNC: 8.9 MG/DL (ref 8.5–9.9)
CHLORIDE BLD-SCNC: 96 MEQ/L (ref 95–107)
CHOLESTEROL, TOTAL: 115 MG/DL (ref 0–199)
CO2: 26 MEQ/L (ref 20–31)
CREAT SERPL-MCNC: 0.74 MG/DL (ref 0.7–1.2)
EOSINOPHILS ABSOLUTE: 0.3 K/UL (ref 0–0.7)
EOSINOPHILS RELATIVE PERCENT: 3.9 %
GFR AFRICAN AMERICAN: >60
GFR NON-AFRICAN AMERICAN: >60
GLOBULIN: 2.9 G/DL (ref 2.3–3.5)
GLUCOSE BLD-MCNC: 255 MG/DL (ref 70–99)
HBA1C MFR BLD: 12.5 % (ref 4.8–5.9)
HCT VFR BLD CALC: 41.5 % (ref 42–52)
HDLC SERPL-MCNC: 35 MG/DL (ref 40–59)
HEMOGLOBIN: 13.6 G/DL (ref 14–18)
LDL CHOLESTEROL CALCULATED: 43 MG/DL (ref 0–129)
LYMPHOCYTES ABSOLUTE: 1.8 K/UL (ref 1–4.8)
LYMPHOCYTES RELATIVE PERCENT: 22.2 %
MCH RBC QN AUTO: 24.4 PG (ref 27–31.3)
MCHC RBC AUTO-ENTMCNC: 32.8 % (ref 33–37)
MCV RBC AUTO: 74.3 FL (ref 80–100)
MICROCYTES: ABNORMAL
MONOCYTES ABSOLUTE: 0.6 K/UL (ref 0.2–0.8)
MONOCYTES RELATIVE PERCENT: 7 %
NEUTROPHILS ABSOLUTE: 5.2 K/UL (ref 1.4–6.5)
NEUTROPHILS RELATIVE PERCENT: 65.7 %
PDW BLD-RTO: 15.1 % (ref 11.5–14.5)
PLATELET # BLD: 224 K/UL (ref 130–400)
POTASSIUM SERPL-SCNC: 4.4 MEQ/L (ref 3.4–4.9)
PROSTATE SPECIFIC ANTIGEN: 0.67 NG/ML (ref 0–3.89)
RBC # BLD: 5.59 M/UL (ref 4.7–6.1)
SODIUM BLD-SCNC: 136 MEQ/L (ref 135–144)
TOTAL PROTEIN: 6.7 G/DL (ref 6.3–8)
TRIGL SERPL-MCNC: 186 MG/DL (ref 0–150)
WBC # BLD: 7.9 K/UL (ref 4.8–10.8)

## 2020-08-06 ENCOUNTER — OFFICE VISIT (OUTPATIENT)
Dept: ENDOCRINOLOGY | Age: 60
End: 2020-08-06
Payer: COMMERCIAL

## 2020-08-06 VITALS
WEIGHT: 315 LBS | OXYGEN SATURATION: 93 % | DIASTOLIC BLOOD PRESSURE: 89 MMHG | SYSTOLIC BLOOD PRESSURE: 149 MMHG | HEART RATE: 94 BPM | BODY MASS INDEX: 45.62 KG/M2

## 2020-08-06 PROCEDURE — 99213 OFFICE O/P EST LOW 20 MIN: CPT | Performed by: INTERNAL MEDICINE

## 2020-08-06 PROCEDURE — 3017F COLORECTAL CA SCREEN DOC REV: CPT | Performed by: INTERNAL MEDICINE

## 2020-08-06 PROCEDURE — 1036F TOBACCO NON-USER: CPT | Performed by: INTERNAL MEDICINE

## 2020-08-06 PROCEDURE — G8427 DOCREV CUR MEDS BY ELIG CLIN: HCPCS | Performed by: INTERNAL MEDICINE

## 2020-08-06 PROCEDURE — G8417 CALC BMI ABV UP PARAM F/U: HCPCS | Performed by: INTERNAL MEDICINE

## 2020-08-06 PROCEDURE — 2022F DILAT RTA XM EVC RTNOPTHY: CPT | Performed by: INTERNAL MEDICINE

## 2020-08-06 PROCEDURE — 3046F HEMOGLOBIN A1C LEVEL >9.0%: CPT | Performed by: INTERNAL MEDICINE

## 2020-08-06 RX ORDER — INSULIN LISPRO 100 [IU]/ML
INJECTION, SOLUTION INTRAVENOUS; SUBCUTANEOUS
Qty: 15 ML | Refills: 5 | Status: SHIPPED | OUTPATIENT
Start: 2020-08-06

## 2020-08-06 RX ORDER — METFORMIN HYDROCHLORIDE 500 MG/1
TABLET, EXTENDED RELEASE ORAL
Qty: 120 TABLET | Refills: 5 | Status: SHIPPED | OUTPATIENT
Start: 2020-08-06 | End: 2021-01-28

## 2020-08-06 RX ORDER — INSULIN GLARGINE 100 [IU]/ML
INJECTION, SOLUTION SUBCUTANEOUS
Qty: 35 PEN | Refills: 1 | Status: SHIPPED | OUTPATIENT
Start: 2020-08-06 | End: 2021-04-01

## 2020-08-06 NOTE — PROGRESS NOTES
Subjective:      Patient ID: Miroslava Tellez is a 64 y.o. male. 5-month follow-up on type 2 diabetes lab review blood sugars have been higher lately last hemoglobin P3Z was 15.5  Complications of diabetes include coronary artery disease  Diabetes   He presents for his follow-up diabetic visit. He has type 2 diabetes mellitus. His disease course has been worsening. Current diabetic treatment includes insulin injections (Lantus Humalog plus metformin). He is currently taking insulin pre-breakfast, pre-lunch, pre-dinner and at bedtime. His overall blood glucose range is >200 mg/dl. An ACE inhibitor/angiotensin II receptor blocker is being taken. Results for Pérez Andres (MRN [id number]) as of 8/6/2020 13:45   Ref.  Range 5/16/2019 16:58 8/15/2019 17:24 12/5/2019 17:43 3/5/2020 17:54 8/3/2020 12:48   Hemoglobin A1C Latest Ref Range: 4.8 - 5.9 % 11.9 9.7 10.5 11.0 12.5 (H)     Patient Active Problem List   Diagnosis    Hypertension    DM [facility]diabetes mellitus) ([facility] [address]    [facility] 40.0-44.9, [facility]    History of tobacco abuse    S/P cardiac pacemaker procedure    Abnormal nuclear stress test    Phlebitis    CAD (coronary artery disease)    Dizziness    Heart palpitations    History of non-ST elevation myocardial infarction (NSTEMI)    A-fib (HCC)    [facility]    Chronic obstructive pulmonary disease ([facility])     Allergies   Allergen Reactions    Actos [Pioglitazone]     Byetta 10 Mcg Pen [Exenatide]      Nausea        Current Outpatient Medications:     LANTUS SOLOSTAR 100 UNIT/ML injection pen, Inject 60 units am and 120  units at bedtime, Disp: 35 pen, Rfl: 1    insulin lispro, 1 Unit Dial, (HUMALOG KWIKPEN) 100 UNIT/ML SOPN, inject 40 units into the skin at each meal, Disp: 15 mL, Rfl: 5    metFORMIN (GLUCOPHAGE-XR) 500 MG extended release tablet, 2 po bid, Disp: 120 tablet, Rfl: 5    metoprolol succinate (TOPROL XL) 100 MG extended release tablet, TAKE ONE TABLET BY Objective:   Physical Exam  Constitutional:       Appearance: Normal appearance. He is obese. HENT:      Head: Normocephalic and atraumatic. Neck:      Musculoskeletal: Normal range of motion and neck supple. Cardiovascular:      Rate and Rhythm: Normal rate. Neurological:      Mental Status: He is alert. Assessment:       Diagnosis Orders   1.  Type 2 diabetes mellitus with other specified complication, with long-term current use of insulin (HCC)             Plan:       Increase insulin dose compliance stressed with diet A1c goal of 7 or lower  Orders Placed This Encounter   Medications    LANTUS SOLOSTAR 100 UNIT/ML injection pen     Sig: Inject 60 units am and 120  units at bedtime     Dispense:  35 pen     Refill:  1    insulin lispro, 1 Unit Dial, (HUMALOG KWIKPEN) 100 UNIT/ML SOPN     Sig: inject 40 units into the skin at each meal     Dispense:  15 mL     Refill:  5    metFORMIN (GLUCOPHAGE-XR) 500 MG extended release tablet     Si po bid     Dispense:  120 tablet     Refill:  5     d        Anabell Sun MD

## 2020-08-10 RX ORDER — RIVAROXABAN 20 MG/1
TABLET, FILM COATED ORAL
Qty: 30 TABLET | Refills: 6 | Status: SHIPPED | OUTPATIENT
Start: 2020-08-10

## 2020-08-10 NOTE — TELEPHONE ENCOUNTER
requesting medication refill.  Please approve or deny this request.    Rx requested:  Requested Prescriptions     Pending Prescriptions Disp Refills    XARELTO 20 MG TABS tablet [Pharmacy Med Name: Xarelto Oral Tablet 20 MG] 30 tablet 0     Sig: TAKE ONE TABLET BY MOUTH daily with breakfast         Last Office Visit:   6/4/2020      Next Visit Date:  Future Appointments   Date Time Provider Janes Isaacs   8/14/2020 11:30 AM Claire Alfonso  [place], [place]   9/8/2020  1:00 PM Miguelina Sanchez MD [facility]

## 2020-08-11 RX ORDER — LOSARTAN POTASSIUM 100 MG/1
TABLET ORAL
Qty: 90 TABLET | Refills: 0 | Status: SHIPPED | OUTPATIENT
Start: 2020-08-11 | End: 2020-11-05

## 2020-08-20 RX ORDER — HYDROCHLOROTHIAZIDE 25 MG/1
TABLET ORAL
Qty: 90 TABLET | Refills: 0 | Status: SHIPPED | OUTPATIENT
Start: 2020-08-20 | End: 2020-09-04 | Stop reason: SDUPTHER

## 2020-08-20 NOTE — TELEPHONE ENCOUNTER
Requesting medication refill.  Please approve or deny this request.    Rx requested:  Requested Prescriptions     Pending Prescriptions Disp Refills    hydroCHLOROthiazide (HYDRODIURIL) 25 MG tablet [Pharmacy Med Name: hydroCHLOROthiazide Oral Tablet 25 MG] 90 tablet 0     Sig: TAKE ONE TABLET BY MOUTH EVERY DAY IN THE MORNING       Last Office Visit:   7/31/2020    Last Filled:      Last Labs:      Next Visit Date:  Future Appointments   Date Time Provider Janes Etienneisti   9/8/2020  1:00 PM Julieth Begum MD [address] [place] [address]

## 2020-08-25 ENCOUNTER — HOSPITAL ENCOUNTER (OUTPATIENT)
Dept: SLEEP CENTER | Age: 60
Discharge: HOME | End: 2020-08-27

## 2020-08-25 PROCEDURE — 95806 SLEEP STUDY UNATT&RESP EFFT: CPT

## 2020-09-04 RX ORDER — HYDROCHLOROTHIAZIDE 25 MG/1
25 TABLET ORAL DAILY
Qty: 90 TABLET | Refills: 0 | Status: SHIPPED | OUTPATIENT
Start: 2020-09-04 | End: 2020-12-04

## 2020-09-08 ENCOUNTER — TELEPHONE (OUTPATIENT)
Dept: FAMILY MEDICINE CLINIC | Age: 60
End: 2020-09-08

## 2020-09-08 NOTE — TELEPHONE ENCOUNTER
Patient called because he has been billed for $1200 for a no show sleep study. This is a mistake on [facility] end, not the sleep lab. Shirin talked to Minerva CORADO about this and she was looking into it. Patient called again on 9/1 and [facility] sent a follow up email to Maria Eugenia Xavier. I checked with [facility] and she has not heard back from Maria Eugenia Xavier. I have advised patient that this issue is being looked at by management.

## 2020-09-09 ENCOUNTER — TELEPHONE (OUTPATIENT)
Dept: FAMILY MEDICINE CLINIC | Age: 60
End: 2020-09-09

## 2020-09-22 RX ORDER — ISOSORBIDE MONONITRATE 30 MG/1
TABLET, EXTENDED RELEASE ORAL
Qty: 90 TABLET | Refills: 1 | Status: SHIPPED | OUTPATIENT
Start: 2020-09-22 | End: 2021-03-29

## 2020-09-28 RX ORDER — METOPROLOL SUCCINATE 100 MG/1
TABLET, EXTENDED RELEASE ORAL
Qty: 90 TABLET | Refills: 0 | Status: SHIPPED | OUTPATIENT
Start: 2020-09-28 | End: 2020-10-05

## 2020-10-06 RX ORDER — METOPROLOL SUCCINATE 100 MG/1
TABLET, EXTENDED RELEASE ORAL
Qty: 90 TABLET | Refills: 1 | Status: SHIPPED | OUTPATIENT
Start: 2020-10-06 | End: 2021-07-02

## 2020-10-06 RX ORDER — ATORVASTATIN CALCIUM 80 MG/1
TABLET, FILM COATED ORAL
Qty: 90 TABLET | Refills: 2 | Status: SHIPPED | OUTPATIENT
Start: 2020-10-06

## 2020-10-06 RX ORDER — OMEPRAZOLE 20 MG/1
20 CAPSULE, DELAYED RELEASE ORAL DAILY
Qty: 30 CAPSULE | Refills: 3 | Status: SHIPPED | OUTPATIENT
Start: 2020-10-06

## 2020-12-02 RX ORDER — SPIRONOLACTONE 50 MG/1
TABLET, FILM COATED ORAL
Qty: 90 TABLET | Refills: 2 | Status: SHIPPED | OUTPATIENT
Start: 2020-12-02 | End: 2021-08-27

## 2020-12-02 NOTE — TELEPHONE ENCOUNTER
requesting medication refill. Please approve or deny this request.    Rx requested:  Requested Prescriptions     Pending Prescriptions Disp Refills    spironolactone (ALDACTONE) 50 MG tablet [Pharmacy Med Name: Spironolactone Oral Tablet 50 MG] 90 tablet 0     Sig: TAKE ONE TABLET BY MOUTH EVERY DAY         Last Office Visit:   6/4/2020      Next Visit Date:  No future appointments.

## 2020-12-30 ENCOUNTER — TELEPHONE (OUTPATIENT)
Dept: FAMILY MEDICINE CLINIC | Age: 60
End: 2020-12-30

## 2020-12-30 NOTE — TELEPHONE ENCOUNTER
HTN outreach call placed to patient in attempt to offer in-office free nurse visit to update vital signs in the patient's medical record. LMOM. no

## 2021-01-28 RX ORDER — METFORMIN HYDROCHLORIDE 500 MG/1
TABLET, EXTENDED RELEASE ORAL
Qty: 120 TABLET | Refills: 0 | Status: SHIPPED | OUTPATIENT
Start: 2021-01-28 | End: 2021-04-01

## 2021-04-01 RX ORDER — METFORMIN HYDROCHLORIDE 500 MG/1
TABLET, EXTENDED RELEASE ORAL
Qty: 120 TABLET | Refills: 0 | Status: SHIPPED | OUTPATIENT
Start: 2021-04-01 | End: 2021-06-01

## 2021-04-01 RX ORDER — INSULIN GLARGINE 100 [IU]/ML
INJECTION, SOLUTION SUBCUTANEOUS
Qty: 105 ML | Refills: 0 | Status: SHIPPED | OUTPATIENT
Start: 2021-04-01 | End: 2021-07-15 | Stop reason: SDUPTHER

## 2021-06-01 RX ORDER — METFORMIN HYDROCHLORIDE 500 MG/1
TABLET, EXTENDED RELEASE ORAL
Qty: 120 TABLET | Refills: 0 | Status: SHIPPED | OUTPATIENT
Start: 2021-06-01 | End: 2021-07-01

## 2021-07-01 DIAGNOSIS — I10 ESSENTIAL HYPERTENSION: ICD-10-CM

## 2021-07-01 RX ORDER — METFORMIN HYDROCHLORIDE 500 MG/1
TABLET, EXTENDED RELEASE ORAL
Qty: 120 TABLET | Refills: 0 | Status: SHIPPED | OUTPATIENT
Start: 2021-07-01

## 2021-07-01 RX ORDER — LOSARTAN POTASSIUM 100 MG/1
TABLET ORAL
Qty: 90 TABLET | Refills: 0 | OUTPATIENT
Start: 2021-07-01

## 2021-07-01 NOTE — TELEPHONE ENCOUNTER
requesting medication refill. Please approve or deny this request.    Rx requested:  Requested Prescriptions     Pending Prescriptions Disp Refills    metoprolol succinate (TOPROL XL) 100 MG extended release tablet [Pharmacy Med Name: Metoprolol Succinate ER Oral Tablet Extended Release 24 Hour 100 MG] 90 tablet 0     Sig: TAKE ONE TABLET BY MOUTH EVERY DAY    isosorbide mononitrate (IMDUR) 30 MG extended release tablet [Pharmacy Med Name: Isosorbide Mononitrate ER Oral Tablet Extended Release 24 Hour 30 MG] 90 tablet 0     Sig: TAKE ONE TABLET BY MOUTH EVERY DAY         Last Office Visit:   6/4/2020      Next Visit Date:  No future appointments.

## 2021-07-02 RX ORDER — OMEPRAZOLE 20 MG/1
20 CAPSULE, DELAYED RELEASE ORAL DAILY
Qty: 30 CAPSULE | Refills: 3 | Status: CANCELLED | OUTPATIENT
Start: 2021-07-02

## 2021-07-02 RX ORDER — METOPROLOL SUCCINATE 100 MG/1
TABLET, EXTENDED RELEASE ORAL
Qty: 30 TABLET | Refills: 0 | Status: SHIPPED | OUTPATIENT
Start: 2021-07-02 | End: 2021-08-04

## 2021-07-02 RX ORDER — ISOSORBIDE MONONITRATE 30 MG/1
TABLET, EXTENDED RELEASE ORAL
Qty: 30 TABLET | Refills: 0 | Status: SHIPPED | OUTPATIENT
Start: 2021-07-02 | End: 2021-08-04

## 2021-07-02 NOTE — TELEPHONE ENCOUNTER
Rx request   Requested Prescriptions     Pending Prescriptions Disp Refills    omeprazole (PRILOSEC) 20 MG delayed release capsule 30 capsule 3     Sig: Take 1 capsule by mouth Daily     LOV 7/31/2020  Next Visit Date:  No future appointments.

## 2021-07-15 RX ORDER — INSULIN GLARGINE 100 [IU]/ML
INJECTION, SOLUTION SUBCUTANEOUS
Qty: 105 ML | Refills: 0 | Status: SHIPPED | OUTPATIENT
Start: 2021-07-15

## 2021-07-15 NOTE — TELEPHONE ENCOUNTER
Pharmacy requesting medication refill. Please approve or deny this request.    Rx requested:  Requested Prescriptions     Pending Prescriptions Disp Refills    LANTUS SOLOSTAR 100 UNIT/ML injection pen 105 mL 0     Sig: INJECT 120 UNITS INTO THE SKIN AT BEDTIME         Last Office Visit:   8/6/2020      Next Visit Date:  No future appointments.

## 2021-08-03 DIAGNOSIS — I10 ESSENTIAL HYPERTENSION: ICD-10-CM

## 2021-08-04 RX ORDER — ISOSORBIDE MONONITRATE 30 MG/1
TABLET, EXTENDED RELEASE ORAL
Qty: 30 TABLET | Refills: 0 | Status: SHIPPED | OUTPATIENT
Start: 2021-08-04 | End: 2021-09-08

## 2021-08-04 RX ORDER — METFORMIN HYDROCHLORIDE 500 MG/1
TABLET, EXTENDED RELEASE ORAL
Qty: 120 TABLET | Refills: 0 | OUTPATIENT
Start: 2021-08-04

## 2021-08-04 RX ORDER — METOPROLOL SUCCINATE 100 MG/1
TABLET, EXTENDED RELEASE ORAL
Qty: 30 TABLET | Refills: 0 | Status: SHIPPED | OUTPATIENT
Start: 2021-08-04 | End: 2021-09-08

## 2021-08-15 DIAGNOSIS — I10 ESSENTIAL HYPERTENSION: ICD-10-CM

## 2021-08-16 RX ORDER — METFORMIN HYDROCHLORIDE 500 MG/1
TABLET, EXTENDED RELEASE ORAL
Qty: 120 TABLET | Refills: 0 | OUTPATIENT
Start: 2021-08-16

## 2021-08-16 RX ORDER — METOPROLOL SUCCINATE 100 MG/1
TABLET, EXTENDED RELEASE ORAL
Qty: 30 TABLET | Refills: 0 | OUTPATIENT
Start: 2021-08-16

## 2021-08-16 RX ORDER — ISOSORBIDE MONONITRATE 30 MG/1
TABLET, EXTENDED RELEASE ORAL
Qty: 30 TABLET | Refills: 0 | OUTPATIENT
Start: 2021-08-16

## 2021-08-16 NOTE — TELEPHONE ENCOUNTER
requesting medication refill. Please approve or deny this request.    Rx requested:  Requested Prescriptions     Pending Prescriptions Disp Refills    metoprolol succinate (TOPROL XL) 100 MG extended release tablet [Pharmacy Med Name: Metoprolol Succinate ER Oral Tablet Extended Release 24 Hour 100 MG] 30 tablet 0     Sig: TAKE ONE TABLET BY MOUTH EVERY DAY. NEEDS TO BE SEEN IN OFFICE FOR FURTHER REFILLS    isosorbide mononitrate (IMDUR) 30 MG extended release tablet [Pharmacy Med Name: Isosorbide Mononitrate ER Oral Tablet Extended Release 24 Hour 30 MG] 30 tablet 0     Sig: TAKE ONE TABLET BY MOUTH EVERY DAY. NEEDS TO BE SEEN IN OFFICE FOR FURTHER REFILLS         Last Office Visit:   6/4/2020      Next Visit Date:  No future appointments.

## 2021-08-27 DIAGNOSIS — I10 ESSENTIAL HYPERTENSION: ICD-10-CM

## 2021-08-29 RX ORDER — SPIRONOLACTONE 50 MG/1
TABLET, FILM COATED ORAL
Qty: 90 TABLET | Refills: 0 | Status: SHIPPED | OUTPATIENT
Start: 2021-08-29

## 2021-09-07 DIAGNOSIS — I10 ESSENTIAL HYPERTENSION: ICD-10-CM

## 2021-09-07 RX ORDER — METFORMIN HYDROCHLORIDE 500 MG/1
TABLET, EXTENDED RELEASE ORAL
Qty: 120 TABLET | Refills: 0 | OUTPATIENT
Start: 2021-09-07

## 2021-09-07 NOTE — TELEPHONE ENCOUNTER
Requesting medication refill. Please approve or deny this request.    Rx requested:  Requested Prescriptions     Pending Prescriptions Disp Refills    metoprolol succinate (TOPROL XL) 100 MG extended release tablet [Pharmacy Med Name: Metoprolol Succinate ER Oral Tablet Extended Release 24 Hour 100 MG] 30 tablet 0     Sig: TAKE 1 TABLET BY MOUTH EVERY DAY. NEED TO BE SEEN IN OFFICE FOR FURTHER REFILLS    isosorbide mononitrate (IMDUR) 30 MG extended release tablet [Pharmacy Med Name: Isosorbide Mononitrate ER Oral Tablet Extended Release 24 Hour 30 MG] 30 tablet 0     Sig: TAKE 1 TABLET BY MOUTH EVERY DAY. NEEDS TO BE SEEN IN OFFICE FOR FURTHER REFILLS         Last Office Visit:   6/4/2020      Next Visit Date:  No future appointments. Last refill 8/29/2021. Please approve or deny.

## 2021-09-08 RX ORDER — ISOSORBIDE MONONITRATE 30 MG/1
TABLET, EXTENDED RELEASE ORAL
Qty: 30 TABLET | Refills: 0 | Status: SHIPPED | OUTPATIENT
Start: 2021-09-08

## 2021-09-08 RX ORDER — METOPROLOL SUCCINATE 100 MG/1
TABLET, EXTENDED RELEASE ORAL
Qty: 30 TABLET | Refills: 0 | Status: SHIPPED | OUTPATIENT
Start: 2021-09-08

## 2021-10-08 DIAGNOSIS — I10 ESSENTIAL HYPERTENSION: ICD-10-CM

## 2021-10-08 RX ORDER — METOPROLOL SUCCINATE 100 MG/1
TABLET, EXTENDED RELEASE ORAL
Qty: 30 TABLET | Refills: 0 | OUTPATIENT
Start: 2021-10-08

## 2021-10-08 RX ORDER — ISOSORBIDE MONONITRATE 30 MG/1
TABLET, EXTENDED RELEASE ORAL
Qty: 30 TABLET | Refills: 0 | OUTPATIENT
Start: 2021-10-08

## 2021-10-08 NOTE — TELEPHONE ENCOUNTER
Requesting medication refill. Please approve or deny this request.    Rx requested:  Requested Prescriptions     Pending Prescriptions Disp Refills    metoprolol succinate (TOPROL XL) 100 MG extended release tablet [Pharmacy Med Name: Metoprolol Succinate ER Oral Tablet Extended Release 24 Hour 100 MG] 30 tablet 0     Sig: TAKE ONE TABLET BY MOUTH ONCE DAILY. NEED TO BE SEEN IN OFFICE FOR FURTHER REFILLS    isosorbide mononitrate (IMDUR) 30 MG extended release tablet [Pharmacy Med Name: Isosorbide Mononitrate ER Oral Tablet Extended Release 24 Hour 30 MG] 30 tablet 0     Sig: TAKE ONE TABLET BY MOUTH EVER DAY. NEED TO BE SEEN IN OFFICE FOR FURTHER REFILLS         Last Office Visit:   6/4/20      Next Visit Date:  No future appointments. Last refill  9/8/21. Please approve or deny.

## 2021-10-11 RX ORDER — METFORMIN HYDROCHLORIDE 500 MG/1
TABLET, EXTENDED RELEASE ORAL
Qty: 120 TABLET | Refills: 0 | OUTPATIENT
Start: 2021-10-11

## 2022-01-02 DIAGNOSIS — I10 ESSENTIAL HYPERTENSION: ICD-10-CM

## 2022-01-03 RX ORDER — ISOSORBIDE MONONITRATE 30 MG/1
TABLET, EXTENDED RELEASE ORAL
Qty: 30 TABLET | Refills: 0 | OUTPATIENT
Start: 2022-01-03

## 2022-01-03 RX ORDER — METOPROLOL SUCCINATE 100 MG/1
TABLET, EXTENDED RELEASE ORAL
Qty: 30 TABLET | Refills: 0 | OUTPATIENT
Start: 2022-01-03

## 2022-01-03 RX ORDER — SPIRONOLACTONE 50 MG/1
TABLET, FILM COATED ORAL
Qty: 90 TABLET | Refills: 0 | OUTPATIENT
Start: 2022-01-03

## 2022-01-03 NOTE — TELEPHONE ENCOUNTER
Requesting medication refill. Please approve or deny this request.    Rx requested:  Requested Prescriptions     Refused Prescriptions Disp Refills    metoprolol succinate (TOPROL XL) 100 MG extended release tablet [Pharmacy Med Name: Metoprolol Succinate ER Oral Tablet Extended Release 24 Hour 100 MG] 30 tablet 0     Sig: TAKE ONE TABLET BY MOUTH ONCE DAILY. NEED TO BE SEEN IN OFFICE FOR FURTHER REFILLS    spironolactone (ALDACTONE) 50 MG tablet [Pharmacy Med Name: Spironolactone Oral Tablet 50 MG] 90 tablet 0     Sig: TAKE ONE TABLET BY MOUTH EVERY DAY    isosorbide mononitrate (IMDUR) 30 MG extended release tablet [Pharmacy Med Name: Isosorbide Mononitrate ER Oral Tablet Extended Release 24 Hour 30 MG] 30 tablet 0     Sig: TAKE ONE TABLET BY MOUTH EVER DAY. NEED TO BE SEEN IN OFFICE FOR FURTHER REFILLS         Last Office Visit:   Visit date not found      Next Visit Date:  No future appointments. Last refill 9/8/21. Please approve or deny.

## 2023-07-20 PROBLEM — I48.91 A-FIB (MULTI): Status: ACTIVE | Noted: 2023-07-20

## 2023-07-20 PROBLEM — Z95.0 CARDIAC PACEMAKER IN SITU: Status: ACTIVE | Noted: 2023-07-20

## 2023-07-20 RX ORDER — INSULIN GLARGINE 300 U/ML
INJECTION, SOLUTION SUBCUTANEOUS
COMMUNITY
Start: 2017-07-17 | End: 2023-08-01 | Stop reason: ALTCHOICE

## 2023-07-20 RX ORDER — LOSARTAN POTASSIUM AND HYDROCHLOROTHIAZIDE 25; 100 MG/1; MG/1
1 TABLET ORAL DAILY
COMMUNITY
Start: 2017-07-25 | End: 2023-08-01 | Stop reason: ALTCHOICE

## 2023-07-20 RX ORDER — EXENATIDE 250 UG/ML
0.4 INJECTION SUBCUTANEOUS
COMMUNITY
Start: 2017-03-30 | End: 2023-08-01 | Stop reason: ALTCHOICE

## 2023-07-20 RX ORDER — GLIPIZIDE 10 MG/1
10 TABLET, FILM COATED, EXTENDED RELEASE ORAL DAILY
COMMUNITY
Start: 2017-07-25 | End: 2023-08-01 | Stop reason: ALTCHOICE

## 2023-07-20 RX ORDER — FLUTICASONE PROPIONATE 50 MCG
2 SPRAY, SUSPENSION (ML) NASAL DAILY
COMMUNITY
Start: 2017-12-19 | End: 2023-08-01 | Stop reason: ALTCHOICE

## 2023-07-20 RX ORDER — METOPROLOL SUCCINATE 100 MG/1
1 TABLET, EXTENDED RELEASE ORAL DAILY
COMMUNITY
Start: 2016-09-21 | End: 2023-08-01 | Stop reason: ALTCHOICE

## 2023-07-20 RX ORDER — ISOSORBIDE MONONITRATE 30 MG/1
1 TABLET, EXTENDED RELEASE ORAL DAILY
COMMUNITY
Start: 2017-12-31 | End: 2023-08-01 | Stop reason: ALTCHOICE

## 2023-07-20 RX ORDER — ATORVASTATIN CALCIUM 80 MG/1
1 TABLET, FILM COATED ORAL NIGHTLY
COMMUNITY
Start: 2017-12-31 | End: 2023-08-01 | Stop reason: ALTCHOICE

## 2023-07-20 RX ORDER — PRAVASTATIN SODIUM 10 MG/1
1 TABLET ORAL EVERY EVENING
COMMUNITY
Start: 2017-07-25 | End: 2023-08-01 | Stop reason: ALTCHOICE

## 2023-07-20 RX ORDER — CODEINE PHOSPHATE AND GUAIFENESIN 10; 100 MG/5ML; MG/5ML
SOLUTION ORAL
COMMUNITY
Start: 2017-12-19 | End: 2023-08-01 | Stop reason: ALTCHOICE

## 2023-07-20 RX ORDER — SPIRONOLACTONE 50 MG/1
1 TABLET, FILM COATED ORAL DAILY
COMMUNITY
Start: 2017-01-19 | End: 2023-08-01 | Stop reason: ALTCHOICE

## 2023-07-20 RX ORDER — METFORMIN HYDROCHLORIDE 500 MG/1
4 TABLET, EXTENDED RELEASE ORAL DAILY
COMMUNITY
Start: 2017-04-14 | End: 2023-08-01 | Stop reason: ALTCHOICE

## 2023-07-20 RX ORDER — NITROGLYCERIN 0.4 MG/1
TABLET SUBLINGUAL
COMMUNITY
Start: 2017-12-26 | End: 2023-08-01 | Stop reason: ALTCHOICE

## 2023-07-20 RX ORDER — SOTALOL HYDROCHLORIDE 120 MG/1
1 TABLET ORAL 2 TIMES DAILY
COMMUNITY
Start: 2018-03-23 | End: 2023-08-01 | Stop reason: ALTCHOICE

## 2023-08-01 ENCOUNTER — OFFICE VISIT (OUTPATIENT)
Dept: PRIMARY CARE | Facility: CLINIC | Age: 63
End: 2023-08-01
Payer: MEDICAID

## 2023-08-01 VITALS
SYSTOLIC BLOOD PRESSURE: 88 MMHG | HEIGHT: 72 IN | TEMPERATURE: 97.7 F | HEART RATE: 122 BPM | BODY MASS INDEX: 25.73 KG/M2 | OXYGEN SATURATION: 98 % | WEIGHT: 190 LBS | DIASTOLIC BLOOD PRESSURE: 61 MMHG | RESPIRATION RATE: 18 BRPM

## 2023-08-01 DIAGNOSIS — I10 PRIMARY HYPERTENSION: ICD-10-CM

## 2023-08-01 DIAGNOSIS — E11.42 TYPE 2 DIABETES MELLITUS WITH DIABETIC POLYNEUROPATHY, WITHOUT LONG-TERM CURRENT USE OF INSULIN (MULTI): ICD-10-CM

## 2023-08-01 DIAGNOSIS — F41.9 ANXIETY: ICD-10-CM

## 2023-08-01 DIAGNOSIS — R63.4 WEIGHT LOSS, UNINTENTIONAL: ICD-10-CM

## 2023-08-01 DIAGNOSIS — Z12.11 ENCOUNTER FOR SCREENING FOR MALIGNANT NEOPLASM OF COLON: ICD-10-CM

## 2023-08-01 DIAGNOSIS — Z12.5 SPECIAL SCREENING EXAMINATION FOR NEOPLASM OF PROSTATE: ICD-10-CM

## 2023-08-01 DIAGNOSIS — I48.91 ATRIAL FIBRILLATION, UNSPECIFIED TYPE (MULTI): ICD-10-CM

## 2023-08-01 DIAGNOSIS — J44.9 CHRONIC OBSTRUCTIVE PULMONARY DISEASE, UNSPECIFIED COPD TYPE (MULTI): ICD-10-CM

## 2023-08-01 DIAGNOSIS — Z00.00 ROUTINE GENERAL MEDICAL EXAMINATION AT A HEALTH CARE FACILITY: Primary | ICD-10-CM

## 2023-08-01 DIAGNOSIS — G62.9 NEUROPATHY: ICD-10-CM

## 2023-08-01 DIAGNOSIS — F32.1 CURRENT MODERATE EPISODE OF MAJOR DEPRESSIVE DISORDER WITHOUT PRIOR EPISODE (MULTI): ICD-10-CM

## 2023-08-01 PROBLEM — I25.2 HISTORY OF NON-ST ELEVATION MYOCARDIAL INFARCTION (NSTEMI): Status: ACTIVE | Noted: 2017-12-30

## 2023-08-01 PROCEDURE — 3074F SYST BP LT 130 MM HG: CPT | Performed by: FAMILY MEDICINE

## 2023-08-01 PROCEDURE — 3078F DIAST BP <80 MM HG: CPT | Performed by: FAMILY MEDICINE

## 2023-08-01 PROCEDURE — 99386 PREV VISIT NEW AGE 40-64: CPT | Performed by: FAMILY MEDICINE

## 2023-08-01 RX ORDER — MIRTAZAPINE 7.5 MG/1
7.5 TABLET, FILM COATED ORAL NIGHTLY
Qty: 30 TABLET | Refills: 2 | Status: SHIPPED | OUTPATIENT
Start: 2023-08-01 | End: 2023-11-28

## 2023-08-01 RX ORDER — ASPIRIN 81 MG/1
81 TABLET ORAL DAILY
Qty: 90 TABLET | Refills: 3 | Status: SHIPPED | OUTPATIENT
Start: 2023-08-01 | End: 2024-07-31

## 2023-08-01 ASSESSMENT — PATIENT HEALTH QUESTIONNAIRE - PHQ9
SUM OF ALL RESPONSES TO PHQ9 QUESTIONS 1 AND 2: 5
1. LITTLE INTEREST OR PLEASURE IN DOING THINGS: MORE THAN HALF THE DAYS
10. IF YOU CHECKED OFF ANY PROBLEMS, HOW DIFFICULT HAVE THESE PROBLEMS MADE IT FOR YOU TO DO YOUR WORK, TAKE CARE OF THINGS AT HOME, OR GET ALONG WITH OTHER PEOPLE: VERY DIFFICULT
7. TROUBLE CONCENTRATING ON THINGS, SUCH AS READING THE NEWSPAPER OR WATCHING TELEVISION: NOT AT ALL
2. FEELING DOWN, DEPRESSED OR HOPELESS: NEARLY EVERY DAY
3. TROUBLE FALLING OR STAYING ASLEEP OR SLEEPING TOO MUCH: NEARLY EVERY DAY
8. MOVING OR SPEAKING SO SLOWLY THAT OTHER PEOPLE COULD HAVE NOTICED. OR THE OPPOSITE, BEING SO FIGETY OR RESTLESS THAT YOU HAVE BEEN MOVING AROUND A LOT MORE THAN USUAL: SEVERAL DAYS
SUM OF ALL RESPONSES TO PHQ QUESTIONS 1-9: 18
6. FEELING BAD ABOUT YOURSELF - OR THAT YOU ARE A FAILURE OR HAVE LET YOURSELF OR YOUR FAMILY DOWN: NEARLY EVERY DAY
4. FEELING TIRED OR HAVING LITTLE ENERGY: MORE THAN HALF THE DAYS
5. POOR APPETITE OR OVEREATING: NEARLY EVERY DAY
9. THOUGHTS THAT YOU WOULD BE BETTER OFF DEAD, OR OF HURTING YOURSELF: SEVERAL DAYS

## 2023-08-01 ASSESSMENT — ANXIETY QUESTIONNAIRES
5. BEING SO RESTLESS THAT IT IS HARD TO SIT STILL: SEVERAL DAYS
GAD7 TOTAL SCORE: 10
3. WORRYING TOO MUCH ABOUT DIFFERENT THINGS: MORE THAN HALF THE DAYS
2. NOT BEING ABLE TO STOP OR CONTROL WORRYING: SEVERAL DAYS
7. FEELING AFRAID AS IF SOMETHING AWFUL MIGHT HAPPEN: SEVERAL DAYS
1. FEELING NERVOUS, ANXIOUS, OR ON EDGE: MORE THAN HALF THE DAYS
IF YOU CHECKED OFF ANY PROBLEMS ON THIS QUESTIONNAIRE, HOW DIFFICULT HAVE THESE PROBLEMS MADE IT FOR YOU TO DO YOUR WORK, TAKE CARE OF THINGS AT HOME, OR GET ALONG WITH OTHER PEOPLE: VERY DIFFICULT
4. TROUBLE RELAXING: SEVERAL DAYS
6. BECOMING EASILY ANNOYED OR IRRITABLE: MORE THAN HALF THE DAYS

## 2023-08-01 ASSESSMENT — ENCOUNTER SYMPTOMS
DIAPHORESIS: 0
BACK PAIN: 0
CHEST TIGHTNESS: 0
ARTHRALGIAS: 0
OCCASIONAL FEELINGS OF UNSTEADINESS: 0
RHINORRHEA: 0
UNEXPECTED WEIGHT CHANGE: 1
DIFFICULTY URINATING: 0
PALPITATIONS: 0
GASTROINTESTINAL NEGATIVE: 1
SORE THROAT: 0
BLURRED VISION: 0
FLANK PAIN: 0
APPETITE CHANGE: 1
DYSURIA: 0
CHILLS: 1
WEIGHT LOSS: 1
DEPRESSION: 1
DIZZINESS: 0
SINUS PAIN: 0
HEMATURIA: 0
EYES NEGATIVE: 1
POLYDIPSIA: 1
LOSS OF SENSATION IN FEET: 1
CHOKING: 0
VISUAL CHANGE: 0
SHORTNESS OF BREATH: 0
MYALGIAS: 0
FATIGUE: 0
COUGH: 0

## 2023-08-01 NOTE — PROGRESS NOTES
Subjective   Patient ID: Gunner Dodge is a 62 y.o. male who presents for Establish Care (Gunner Dodge 62 y.o. male is here today to establish care and for his annual physical exam. /Previous Dr. Cabral, Last seen 3 years ago. Cardiologist last seen 3 years ago/Last Colonoscopy- 2015//PATIENT HAS NOT BEEN ON ANY MEDICATIONS IN 3 YEARS /Diabetes/CAD/A-fib/COPD/), Weight Loss (Pt. States he lost 200lb in the last 3 years without trying ), and Depression.    HPI     Review of Systems    Objective   BP 88/61 (BP Location: Right arm, Patient Position: Sitting, BP Cuff Size: Large adult)   Pulse (!) 122   Temp 36.5 °C (97.7 °F) (Rectal)   Resp 18   Ht 1.829 m (6')   Wt 86.2 kg (190 lb)   SpO2 98%   BMI 25.77 kg/m²     Physical Exam    Assessment/Plan

## 2023-08-01 NOTE — PROGRESS NOTES
Subjective   Patient ID: Gunner Dodge is a 62 y.o. male who presents for Establish Care (Gunner Dodge 62 y.o. male is here today to establish care and for his annual physical exam. /Previous Dr. Cabral, Last seen 3 years ago. Cardiologist last seen 3 years ago/Last Colonoscopy- 2015//PATIENT HAS NOT BEEN ON ANY MEDICATIONS IN 3 YEARS /Diabetes/CAD/A-fib/COPD/), Weight Loss (Pt. States he lost 200lb in the last 3 years without trying ), Depression (X couple years ), Balance Problem (Pt. States he feel unsteady when he walks ), and Annual Exam.    Well Adult Physical   Patient here for a comprehensive physical exam.The patient reports wt loss, DM, lipids. No therapy for at least 3 yrs    Do you take any herbs or supplements that were not prescribed by a doctor? no   Are you taking calcium supplements? no   Are you taking aspirin daily? no     History:  Date last PSA: 2020        Depression  Visit Type: initial  Episode onset: 2014 when pacemaker was placed.  Progression since onset: unchanged  Patient presents with the following symptoms: weight loss.  Patient is not experiencing: choking sensation, palpitations and shortness of breath.      Diabetes  He presents for his follow-up diabetic visit. He has type 2 diabetes mellitus. The initial diagnosis of diabetes was made 13 years ago. Pertinent negatives for hypoglycemia include no dizziness. Associated symptoms include polydipsia and weight loss. Pertinent negatives for diabetes include no blurred vision, no chest pain, no fatigue, no foot ulcerations and no visual change. (Loss of sensation in both feet.)   Hyperlipidemia  This is a chronic problem. The current episode started more than 1 year ago. The problem is uncontrolled. Pertinent negatives include no chest pain, myalgias or shortness of breath.      Uncontrolled Weight Loss  Down from 379 lbs in 2020 to currently 190 lbs at visit today. States that has been eating less over past few years b/c lost  appetite. Believes that weight loss is partly due to reduced appetite.       Review of Systems   Constitutional:  Positive for appetite change, chills, unexpected weight change and weight loss. Negative for diaphoresis and fatigue.   HENT:  Negative for congestion, ear discharge, ear pain, rhinorrhea, sinus pain and sore throat.    Eyes: Negative.  Negative for blurred vision.   Respiratory:  Negative for cough, choking, chest tightness and shortness of breath.    Cardiovascular:  Negative for chest pain, palpitations and leg swelling.   Gastrointestinal: Negative.    Endocrine: Positive for polydipsia.   Genitourinary:  Negative for difficulty urinating, dysuria, enuresis, flank pain and hematuria.   Musculoskeletal:  Negative for arthralgias, back pain, gait problem and myalgias.   Skin: Negative.    Neurological:  Negative for dizziness.        Bilateral neuropathy in legs and feet.   Psychiatric/Behavioral:  Positive for depression.        Objective   BP 88/61 (BP Location: Right arm, Patient Position: Sitting, BP Cuff Size: Large adult)   Pulse (!) 122   Temp 36.5 °C (97.7 °F) (Rectal)   Resp 18   Ht 1.829 m (6')   Wt 86.2 kg (190 lb)   SpO2 98%   BMI 25.77 kg/m²     Physical Exam  Constitutional:       Appearance: Normal appearance. He is normal weight.   HENT:      Head: Normocephalic and atraumatic.      Nose: No congestion.   Eyes:      Extraocular Movements: Extraocular movements intact.   Cardiovascular:      Rate and Rhythm: Regular rhythm. Tachycardia present.      Heart sounds: No murmur heard.     No friction rub. No gallop.   Pulmonary:      Effort: Pulmonary effort is normal. No respiratory distress.      Breath sounds: Normal breath sounds. No stridor. No wheezing or rhonchi.   Abdominal:      General: Abdomen is flat. There is no distension.      Palpations: There is no mass.      Tenderness: There is no abdominal tenderness.   Musculoskeletal:         General: Normal range of motion.       Cervical back: Normal range of motion and neck supple. No rigidity or tenderness.      Right lower leg: No edema.      Left lower leg: No edema.   Lymphadenopathy:      Cervical: No cervical adenopathy.   Skin:     General: Skin is warm.      Coloration: Skin is not jaundiced or pale.      Findings: No bruising or erythema.   Neurological:      General: No focal deficit present.      Mental Status: He is alert and oriented to person, place, and time. Mental status is at baseline.      Comments: Neuropathy bilateral feet and hands.   Psychiatric:         Mood and Affect: Mood normal.         Behavior: Behavior normal.         Assessment/Plan   Diagnoses and all orders for this visit:  Routine general medical examination at a health care facility  -     Colonoscopy; Future  -     Prostate Spec.Ag,Screen; Future  -     Comprehensive metabolic panel; Future  -     Tsh With Reflex To Free T4 If Abnormal; Future  -     CBC and Auto Differential; Future  -     Lipid panel; Future  -     Hemoglobin A1c; Future  -     Magnesium; Future  -     Vitamin D 25-Hydroxy,Total; Future  -     Albumin, urine, random; Future  -     Follow Up In [facility] - PCP - Established; Future  Encounter for screening for malignant neoplasm of colon  -     Colonoscopy; Future  -     Follow Up In [facility] - PCP - Established; Future  Primary hypertension  -     Colonoscopy; Future  -     Comprehensive metabolic panel; Future  -     Tsh With Reflex To Free T4 If Abnormal; Future  -     CBC and Auto Differential; Future  -     Lipid panel; Future  -     Magnesium; Future  -     Vitamin D 25-Hydroxy,Total; Future  -     Albumin, urine, random; Future  -     Follow Up In [facility] - PCP - Established; Future  Type 2 diabetes mellitus with diabetic polyneuropathy, without long-term current use of insulin (CMS/[facility])  -     Colonoscopy; Future  -     Comprehensive metabolic panel; Future  -     Tsh With Reflex To Free  T4 If Abnormal; Future  -     CBC and Auto Differential; Future  -     Lipid panel; Future  -     Hemoglobin A1c; Future  -     Magnesium; Future  -     Vitamin D 25-Hydroxy,Total; Future  -     Albumin, urine, random; Future  -     Follow Up In [facility] - PCP - Established; Future  -     aspirin 81 mg EC tablet; Take 1 tablet (81 mg) by mouth once daily.  Special screening examination for neoplasm of prostate  -     Prostate Spec.Ag,Screen; Future  -     Follow Up In [facility] - PCP - Established; Future  Current moderate episode of major depressive disorder without prior episode (CMS/HCC)  -     mirtazapine (Remeron) 7.5 mg tablet; Take 1 tablet (7.5 mg) by mouth once daily at bedtime.  -     Follow Up In [facility] - PCP - Established; Future  Anxiety  -     mirtazapine (Remeron) 7.5 mg tablet; Take 1 tablet (7.5 mg) by mouth once daily at bedtime.  -     Follow Up In [facility] - PCP - Established; Future  Atrial fibrillation, unspecified type (CMS/HCC)  -     Protime-INR; Future  Chronic obstructive pulmonary disease, unspecified COPD type (CMS/HCC)  Weight loss, unintentional  -     Prealbumin; Future  -     Protime-INR; Future  Neuropathy  -     Vitamin B12; Future  -     Folate; Future  -     Vitamin B6; Future           Pt is a 63 y/o male presenting to [facility] care after not having seen a physician in 3 years. States that he has not taken any medications for past 3 years. He is a diabetic with most recent A1c of 12.5% (2020), likely much higher now. Patient was identified as a fall risk. Risk prevention instructions provided.    Plan going forward is to order basic labs, colonoscopy, and PSA. Pt should be started on SSRI for his chronic, untreated depression and anxiety.    Patient was identified as a fall risk. Risk prevention instructions provided.

## 2023-08-01 NOTE — PATIENT INSTRUCTIONS

## 2023-08-04 DIAGNOSIS — I48.0 PAROXYSMAL ATRIAL FIBRILLATION (HCC): ICD-10-CM

## 2023-08-04 DIAGNOSIS — I25.2 HISTORY OF NON-ST ELEVATION MYOCARDIAL INFARCTION (NSTEMI): ICD-10-CM

## 2023-08-04 DIAGNOSIS — I25.119 CORONARY ARTERY DISEASE INVOLVING NATIVE CORONARY ARTERY OF NATIVE HEART WITH ANGINA PECTORIS (HCC): ICD-10-CM

## 2023-08-04 DIAGNOSIS — Z95.0 S/P CARDIAC PACEMAKER PROCEDURE: ICD-10-CM

## 2023-08-04 DIAGNOSIS — R00.2 HEART PALPITATIONS: ICD-10-CM

## 2023-08-04 DIAGNOSIS — I47.1 SVT (SUPRAVENTRICULAR TACHYCARDIA) (HCC): Primary | ICD-10-CM

## 2023-08-13 ENCOUNTER — HOSPITAL ENCOUNTER (EMERGENCY)
Age: 63
Discharge: HOME | End: 2023-08-14
Payer: MEDICAID

## 2023-08-13 DIAGNOSIS — Z79.4 TYPE 2 DIABETES MELLITUS WITH HYPERGLYCEMIA, WITH LONG-TERM CURRENT USE OF INSULIN (HCC): ICD-10-CM

## 2023-08-13 DIAGNOSIS — K59.00 CONSTIPATION, UNSPECIFIED CONSTIPATION TYPE: Primary | ICD-10-CM

## 2023-08-13 DIAGNOSIS — E11.65 TYPE 2 DIABETES MELLITUS WITH HYPERGLYCEMIA, WITH LONG-TERM CURRENT USE OF INSULIN (HCC): ICD-10-CM

## 2023-08-13 PROCEDURE — 83605 ASSAY OF LACTIC ACID: CPT

## 2023-08-13 PROCEDURE — 99285 EMERGENCY DEPT VISIT HI MDM: CPT

## 2023-08-13 PROCEDURE — 81001 URINALYSIS AUTO W/SCOPE: CPT

## 2023-08-13 PROCEDURE — 36415 COLL VENOUS BLD VENIPUNCTURE: CPT

## 2023-08-13 PROCEDURE — 93005 ELECTROCARDIOGRAM TRACING: CPT | Performed by: PHYSICIAN ASSISTANT

## 2023-08-13 PROCEDURE — 83690 ASSAY OF LIPASE: CPT

## 2023-08-13 PROCEDURE — 96374 THER/PROPH/DIAG INJ IV PUSH: CPT

## 2023-08-13 PROCEDURE — 80053 COMPREHEN METABOLIC PANEL: CPT

## 2023-08-13 PROCEDURE — 96361 HYDRATE IV INFUSION ADD-ON: CPT

## 2023-08-13 PROCEDURE — 85025 COMPLETE CBC W/AUTO DIFF WBC: CPT

## 2023-08-13 PROCEDURE — 84484 ASSAY OF TROPONIN QUANT: CPT

## 2023-08-13 PROCEDURE — 2580000003 HC RX 258: Performed by: PHYSICIAN ASSISTANT

## 2023-08-13 PROCEDURE — 85610 PROTHROMBIN TIME: CPT

## 2023-08-13 RX ORDER — 0.9 % SODIUM CHLORIDE 0.9 %
1000 INTRAVENOUS SOLUTION INTRAVENOUS ONCE
Status: COMPLETED | OUTPATIENT
Start: 2023-08-13 | End: 2023-08-14

## 2023-08-13 RX ADMIN — SODIUM CHLORIDE 1000 ML: 9 INJECTION, SOLUTION INTRAVENOUS at 23:50

## 2023-08-13 ASSESSMENT — LIFESTYLE VARIABLES: HOW OFTEN DO YOU HAVE A DRINK CONTAINING ALCOHOL: NEVER

## 2023-08-13 ASSESSMENT — PAIN SCALES - GENERAL: PAINLEVEL_OUTOF10: 3

## 2023-08-13 ASSESSMENT — ENCOUNTER SYMPTOMS
SHORTNESS OF BREATH: 0
ALLERGIC/IMMUNOLOGIC NEGATIVE: 1
EYE PAIN: 0
APNEA: 0
COLOR CHANGE: 0
ABDOMINAL PAIN: 1
CONSTIPATION: 1
BLOOD IN STOOL: 0
RECTAL PAIN: 1
TROUBLE SWALLOWING: 0

## 2023-08-13 ASSESSMENT — PAIN - FUNCTIONAL ASSESSMENT: PAIN_FUNCTIONAL_ASSESSMENT: 0-10

## 2023-08-13 ASSESSMENT — PAIN DESCRIPTION - ORIENTATION: ORIENTATION: MID

## 2023-08-13 ASSESSMENT — PAIN DESCRIPTION - LOCATION: LOCATION: ABDOMEN

## 2023-08-13 ASSESSMENT — PAIN DESCRIPTION - DESCRIPTORS: DESCRIPTORS: CRAMPING

## 2023-08-13 ASSESSMENT — PAIN DESCRIPTION - PAIN TYPE: TYPE: ACUTE PAIN

## 2023-08-14 ENCOUNTER — APPOINTMENT (OUTPATIENT)
Dept: CT IMAGING | Age: 63
End: 2023-08-14
Payer: MEDICAID

## 2023-08-14 VITALS
RESPIRATION RATE: 14 BRPM | HEART RATE: 100 BPM | OXYGEN SATURATION: 100 % | HEIGHT: 74 IN | DIASTOLIC BLOOD PRESSURE: 80 MMHG | TEMPERATURE: 98 F | WEIGHT: 190 LBS | BODY MASS INDEX: 24.38 KG/M2 | SYSTOLIC BLOOD PRESSURE: 136 MMHG

## 2023-08-14 LAB
ALBUMIN SERPL-MCNC: 4.4 G/DL (ref 3.5–4.6)
ALP SERPL-CCNC: 85 U/L (ref 35–104)
ALT SERPL-CCNC: 10 U/L (ref 0–41)
ANION GAP SERPL CALCULATED.3IONS-SCNC: 18 MEQ/L (ref 9–15)
AST SERPL-CCNC: 10 U/L (ref 0–40)
BACTERIA URNS QL MICRO: NEGATIVE /HPF
BASOPHILS # BLD: 0 K/UL (ref 0–0.2)
BASOPHILS NFR BLD: 0.3 %
BILIRUB SERPL-MCNC: 0.9 MG/DL (ref 0.2–0.7)
BILIRUB UR QL STRIP: NEGATIVE
BUN SERPL-MCNC: 20 MG/DL (ref 8–23)
CALCIUM SERPL-MCNC: 9.7 MG/DL (ref 8.5–9.9)
CHLORIDE SERPL-SCNC: 100 MEQ/L (ref 95–107)
CHP ED QC CHECK: NORMAL
CLARITY UR: CLEAR
CO2 SERPL-SCNC: 20 MEQ/L (ref 20–31)
COLOR UR: YELLOW
CREAT SERPL-MCNC: 0.68 MG/DL (ref 0.7–1.2)
EOSINOPHIL # BLD: 0 K/UL (ref 0–0.7)
EOSINOPHIL NFR BLD: 0.3 %
EPI CELLS #/AREA URNS AUTO: NORMAL /HPF (ref 0–5)
ERYTHROCYTE [DISTWIDTH] IN BLOOD BY AUTOMATED COUNT: 13.8 % (ref 11.5–14.5)
GLOBULIN SER CALC-MCNC: 2.4 G/DL (ref 2.3–3.5)
GLUCOSE BLD-MCNC: 279 MG/DL
GLUCOSE BLD-MCNC: 279 MG/DL (ref 70–99)
GLUCOSE SERPL-MCNC: 413 MG/DL (ref 70–99)
GLUCOSE UR STRIP-MCNC: >=1000 MG/DL
HCT VFR BLD AUTO: 45.8 % (ref 42–52)
HGB BLD-MCNC: 15.6 G/DL (ref 14–18)
HGB UR QL STRIP: NEGATIVE
HYALINE CASTS #/AREA URNS AUTO: NORMAL /HPF (ref 0–5)
INR PPP: 1.1
KETONES UR STRIP-MCNC: 15 MG/DL
LACTATE BLDV-SCNC: 2.6 MMOL/L (ref 0.5–2.2)
LEUKOCYTE ESTERASE UR QL STRIP: NEGATIVE
LIPASE SERPL-CCNC: 11 U/L (ref 12–95)
LYMPHOCYTES # BLD: 1.3 K/UL (ref 1–4.8)
LYMPHOCYTES NFR BLD: 10.8 %
MCH RBC QN AUTO: 27.9 PG (ref 27–31.3)
MCHC RBC AUTO-ENTMCNC: 34 % (ref 33–37)
MCV RBC AUTO: 82.2 FL (ref 79–92.2)
MONOCYTES # BLD: 0.9 K/UL (ref 0.2–0.8)
MONOCYTES NFR BLD: 7.8 %
NEUTROPHILS # BLD: 9.5 K/UL (ref 1.4–6.5)
NEUTS SEG NFR BLD: 80.8 %
NITRITE UR QL STRIP: NEGATIVE
PERFORMED ON: ABNORMAL
PH UR STRIP: 5 [PH] (ref 5–9)
PLATELET # BLD AUTO: 180 K/UL (ref 130–400)
POTASSIUM SERPL-SCNC: 4.2 MEQ/L (ref 3.4–4.9)
PROT SERPL-MCNC: 6.8 G/DL (ref 6.3–8)
PROT UR STRIP-MCNC: 30 MG/DL
PROTHROMBIN TIME: 14.1 SEC (ref 12.3–14.9)
RBC # BLD AUTO: 5.57 M/UL (ref 4.7–6.1)
RBC #/AREA URNS AUTO: NORMAL /HPF (ref 0–5)
SODIUM SERPL-SCNC: 138 MEQ/L (ref 135–144)
SP GR UR STRIP: 1.05 (ref 1–1.03)
TROPONIN T SERPL-MCNC: <0.01 NG/ML (ref 0–0.01)
URINE REFLEX TO CULTURE: ABNORMAL
UROBILINOGEN UR STRIP-ACNC: 0.2 E.U./DL
WBC # BLD AUTO: 11.8 K/UL (ref 4.8–10.8)
WBC #/AREA URNS AUTO: NORMAL /HPF (ref 0–5)

## 2023-08-14 PROCEDURE — 74177 CT ABD & PELVIS W/CONTRAST: CPT

## 2023-08-14 PROCEDURE — 6370000000 HC RX 637 (ALT 250 FOR IP): Performed by: PHYSICIAN ASSISTANT

## 2023-08-14 PROCEDURE — 2580000003 HC RX 258

## 2023-08-14 PROCEDURE — 6360000004 HC RX CONTRAST MEDICATION: Performed by: PHYSICIAN ASSISTANT

## 2023-08-14 RX ORDER — 0.9 % SODIUM CHLORIDE 0.9 %
1000 INTRAVENOUS SOLUTION INTRAVENOUS ONCE
Status: COMPLETED | OUTPATIENT
Start: 2023-08-14 | End: 2023-08-14

## 2023-08-14 RX ORDER — DOCUSATE SODIUM 100 MG/1
100 CAPSULE, LIQUID FILLED ORAL 2 TIMES DAILY
Qty: 60 CAPSULE | Refills: 0 | Status: SHIPPED | OUTPATIENT
Start: 2023-08-14 | End: 2023-09-13

## 2023-08-14 RX ORDER — POLYETHYLENE GLYCOL 3350 17 G/17G
17 POWDER, FOR SOLUTION ORAL DAILY
Qty: 510 G | Refills: 0 | Status: SHIPPED | OUTPATIENT
Start: 2023-08-14 | End: 2023-09-13

## 2023-08-14 RX ADMIN — IOPAMIDOL 75 ML: 612 INJECTION, SOLUTION INTRAVENOUS at 01:49

## 2023-08-14 RX ADMIN — SODIUM CHLORIDE 1000 ML: 9 INJECTION, SOLUTION INTRAVENOUS at 04:34

## 2023-08-14 RX ADMIN — INSULIN HUMAN 10 UNITS: 100 INJECTION, SOLUTION PARENTERAL at 02:27

## 2023-08-14 NOTE — DISCHARGE INSTRUCTIONS
Take medications as directed. Ensure that you are increasing your daily dietary fiber intake, as well as drinking plenty of water. Follow-up with PCP. Return to ED if any new, worsening symptoms.

## 2023-08-14 NOTE — ED NOTES
Pt instructed on urine for 3rd time. Pt stated he is unable to obtain.      Sd Valenzuela RN  08/14/23 [id number]

## 2023-08-14 NOTE — ED NOTES
Pt up to bathroom 2 times with med size BM. Pt instructed on urine and stated he is unable to give specimen at this time.      Francesca Muñoz RN  08/14/23 [id number]

## 2023-08-14 NOTE — ED PROVIDER NOTES
[facility] ED  EMERGENCY DEPARTMENT ENCOUNTER      Pt Name: Beverly Georges  MRN: [id number]  [address] [facility] [place] 1960  Date of evaluation: 8/13/2023  Provider: KEL Armstrong    CHIEF COMPLAINT       Chief Complaint   Patient presents with    Constipation     Pt constipated for 1 week, dizzy when pushing to have BM         HISTORY OF PRESENT ILLNESS   (Location/Symptom, Timing/Onset, Context/Setting, Quality, Duration, Modifying Factors, Severity)  Note limiting factors. Beverly Georges is a 63 y.o. male who presents to the emergency department pain, rectal pain and no bowel movement greater than a week. Patient also states that the dizziness when attempting to move his bowels. Patient states his last bowel movement was normal with a week ago. He states he is been getting very small amounts of liquid stool only but not having any full stools. Patient denies any chest pain or shortness of breath. Patient denies any change in urination    HPI    Nursing Notes were reviewed. REVIEW OF SYSTEMS    (2-9 systems for level 4, 10 or more for level 5)     Review of Systems   Constitutional:  Negative for diaphoresis and fever. HENT:  Negative for hearing loss and trouble swallowing. Eyes:  Negative for pain. Respiratory:  Negative for apnea and shortness of breath. Cardiovascular:  Negative for chest pain. Gastrointestinal:  Positive for abdominal pain, constipation and rectal pain. Negative for blood in stool. Endocrine: Negative. Genitourinary:  Negative for hematuria. Musculoskeletal:  Negative for neck pain and neck stiffness. Skin:  Negative for color change. Allergic/Immunologic: Negative. Neurological:  Positive for dizziness. Negative for numbness. Hematological: Negative. Psychiatric/Behavioral: Negative. All other systems reviewed and are negative. Except as noted above the remainder of the review of systems was reviewed and negative.        PAST MEDICAL

## 2023-08-16 LAB
EKG ATRIAL RATE: 110 BPM
EKG P AXIS: 80 DEGREES
EKG P-R INTERVAL: 158 MS
EKG Q-T INTERVAL: 342 MS
EKG QRS DURATION: 84 MS
EKG QTC CALCULATION (BAZETT): 462 MS
EKG R AXIS: 64 DEGREES
EKG T AXIS: 93 DEGREES
EKG VENTRICULAR RATE: 110 BPM

## 2023-08-16 PROCEDURE — 93010 ELECTROCARDIOGRAM REPORT: CPT | Performed by: INTERNAL MEDICINE

## 2023-08-28 PROBLEM — F32.1 MAJOR DEPRESSIVE DISORDER, SINGLE EPISODE, MODERATE (MULTI): Status: ACTIVE | Noted: 2023-08-28

## 2023-08-28 PROBLEM — F41.9 ANXIETY: Status: ACTIVE | Noted: 2023-08-28

## 2023-08-28 PROBLEM — G62.9 NEUROPATHY: Status: ACTIVE | Noted: 2023-08-28

## 2023-08-28 RX ORDER — DOCUSATE SODIUM 100 MG/1
1 CAPSULE, LIQUID FILLED ORAL 2 TIMES DAILY
COMMUNITY
Start: 2023-08-14 | End: 2023-09-13

## 2023-09-06 ENCOUNTER — OFFICE VISIT (OUTPATIENT)
Dept: PRIMARY CARE | Facility: CLINIC | Age: 63
End: 2023-09-06
Payer: MEDICAID

## 2023-09-06 VITALS
HEIGHT: 72 IN | DIASTOLIC BLOOD PRESSURE: 68 MMHG | TEMPERATURE: 97.7 F | BODY MASS INDEX: 25.33 KG/M2 | OXYGEN SATURATION: 96 % | WEIGHT: 187 LBS | RESPIRATION RATE: 18 BRPM | SYSTOLIC BLOOD PRESSURE: 93 MMHG | HEART RATE: 113 BPM

## 2023-09-06 DIAGNOSIS — E55.9 VITAMIN D DEFICIENCY: ICD-10-CM

## 2023-09-06 DIAGNOSIS — I10 PRIMARY HYPERTENSION: ICD-10-CM

## 2023-09-06 DIAGNOSIS — E11.42 TYPE 2 DIABETES MELLITUS WITH DIABETIC POLYNEUROPATHY, WITH LONG-TERM CURRENT USE OF INSULIN (MULTI): Primary | ICD-10-CM

## 2023-09-06 DIAGNOSIS — F32.1 CURRENT MODERATE EPISODE OF MAJOR DEPRESSIVE DISORDER WITHOUT PRIOR EPISODE (MULTI): ICD-10-CM

## 2023-09-06 DIAGNOSIS — Z79.4 TYPE 2 DIABETES MELLITUS WITH DIABETIC POLYNEUROPATHY, WITH LONG-TERM CURRENT USE OF INSULIN (MULTI): Primary | ICD-10-CM

## 2023-09-06 DIAGNOSIS — F41.9 ANXIETY: ICD-10-CM

## 2023-09-06 PROCEDURE — 99214 OFFICE O/P EST MOD 30 MIN: CPT | Performed by: FAMILY MEDICINE

## 2023-09-06 PROCEDURE — 3074F SYST BP LT 130 MM HG: CPT | Performed by: FAMILY MEDICINE

## 2023-09-06 PROCEDURE — 3078F DIAST BP <80 MM HG: CPT | Performed by: FAMILY MEDICINE

## 2023-09-06 RX ORDER — ACETAMINOPHEN 500 MG
50 TABLET ORAL DAILY
Qty: 90 CAPSULE | Refills: 3 | Status: SHIPPED | OUTPATIENT
Start: 2023-09-06 | End: 2024-09-05

## 2023-09-06 RX ORDER — POLYETHYLENE GLYCOL 3350 17 G/17G
17 POWDER, FOR SOLUTION ORAL DAILY
COMMUNITY
Start: 2023-08-14 | End: 2024-03-06 | Stop reason: SDUPTHER

## 2023-09-06 RX ORDER — ERGOCALCIFEROL 1.25 MG/1
50000 CAPSULE ORAL
Qty: 12 CAPSULE | Refills: 0 | Status: SHIPPED | OUTPATIENT
Start: 2023-09-06 | End: 2023-12-06 | Stop reason: ALTCHOICE

## 2023-09-06 RX ORDER — INSULIN DEGLUDEC 200 U/ML
10 INJECTION, SOLUTION SUBCUTANEOUS NIGHTLY
Qty: 1 EACH | Refills: 11 | Status: SHIPPED | OUTPATIENT
Start: 2023-09-06 | End: 2023-11-20 | Stop reason: CLARIF

## 2023-09-06 RX ORDER — BLOOD-GLUCOSE SENSOR
EACH MISCELLANEOUS
Qty: 3 EACH | Refills: 11 | Status: SHIPPED | OUTPATIENT
Start: 2023-09-06 | End: 2023-12-19 | Stop reason: WASHOUT

## 2023-09-06 RX ORDER — PEN NEEDLE, DIABETIC 30 GX3/16"
NEEDLE, DISPOSABLE MISCELLANEOUS
Qty: 100 EACH | Refills: 11 | Status: SHIPPED | OUTPATIENT
Start: 2023-09-06 | End: 2024-09-05

## 2023-09-06 ASSESSMENT — ENCOUNTER SYMPTOMS
JOINT SWELLING: 0
PALPITATIONS: 0
WEAKNESS: 0
CHEST TIGHTNESS: 0
NECK STIFFNESS: 0
DIZZINESS: 0
SINUS PRESSURE: 0
DEPRESSION: 1
PHOTOPHOBIA: 0
VOMITING: 0
COUGH: 0
FACIAL ASYMMETRY: 0
DYSURIA: 0
HALLUCINATIONS: 0
FEVER: 0
EYE REDNESS: 0
HEMATURIA: 0
ADENOPATHY: 0
ARTHRALGIAS: 0
SINUS PAIN: 0
NERVOUS/ANXIOUS: 0
SEIZURES: 0
SLEEP DISTURBANCE: 0
SHORTNESS OF BREATH: 0
EYE ITCHING: 0
BACK PAIN: 0
WEIGHT LOSS: 1
HEADACHES: 0
EYE PAIN: 0
CHOKING: 0
LIGHT-HEADEDNESS: 0
FLANK PAIN: 0
ABDOMINAL PAIN: 0
NECK PAIN: 0
FATIGUE: 0
DIARRHEA: 0
ABDOMINAL DISTENTION: 0
SORE THROAT: 0
SPEECH DIFFICULTY: 0
WHEEZING: 0
UNEXPECTED WEIGHT CHANGE: 0
MYALGIAS: 0
POLYDIPSIA: 0
WOUND: 0
APPETITE CHANGE: 0
TROUBLE SWALLOWING: 0
DYSPHORIC MOOD: 0
ACTIVITY CHANGE: 0
DIAPHORESIS: 0
CONFUSION: 0
AGITATION: 0
EYE DISCHARGE: 0
RHINORRHEA: 0
NUMBNESS: 0
VOICE CHANGE: 0
CONSTIPATION: 0
BLOOD IN STOOL: 0
FREQUENCY: 0
TREMORS: 0
NAUSEA: 0
CHILLS: 0
BRUISES/BLEEDS EASILY: 0

## 2023-09-06 NOTE — PROGRESS NOTES
Subjective   Patient ID: Gunner Dodge is a 62 y.o. male who presents for 1 month follow up ( to review labs. ) and ER Follow-up ([facility] 8/13/2023/DX: Constipation and Type 2 diabetes mellitus with hyperglycemia, with long-term current use of insulin//).          Diabetes  He presents for his follow-up diabetic visit. He has type 2 diabetes mellitus. The initial diagnosis of diabetes was made 13 years ago. His disease course has been stable. Pertinent negatives for hypoglycemia include no confusion, dizziness, headaches, nervousness/anxiousness, seizures, speech difficulty or tremors. Associated symptoms include foot paresthesias and weight loss. Pertinent negatives for diabetes include no chest pain, no fatigue, no polydipsia, no polyuria and no weakness. (Loss of sensation in both feet.) Symptoms are stable. Diabetic complications include heart disease and peripheral neuropathy. Risk factors for coronary artery disease include diabetes mellitus. When asked about current treatments, none were reported. He is following a generally healthy diet. When asked about meal planning, he reported none. He has not had a previous visit with a dietitian. He never participates in exercise. An ACE inhibitor/angiotensin II receptor blocker is not being taken. He does not see a podiatrist.Eye exam is not current.   Hyperlipidemia  This is a chronic problem. The current episode started more than 1 year ago. The problem is uncontrolled. Exacerbating diseases include diabetes. Pertinent negatives include no chest pain, myalgias or shortness of breath. He is currently on no antihyperlipidemic treatment. Compliance problems include adherence to exercise and adherence to diet.             Review of Systems   Constitutional:  Positive for weight loss. Negative for activity change, appetite change, chills, diaphoresis, fatigue, fever and unexpected weight change.   HENT:  Negative for congestion, ear discharge, ear pain, hearing loss,  nosebleeds, postnasal drip, rhinorrhea, sinus pressure, sinus pain, sneezing, sore throat, tinnitus, trouble swallowing and voice change.    Eyes:  Negative for photophobia, pain, discharge, redness, itching and visual disturbance.   Respiratory:  Negative for cough, choking, chest tightness, shortness of breath and wheezing.    Cardiovascular:  Negative for chest pain, palpitations and leg swelling.   Gastrointestinal:  Negative for abdominal distention, abdominal pain, blood in stool, constipation, diarrhea, nausea and vomiting.   Endocrine: Negative for cold intolerance, heat intolerance, polydipsia and polyuria.   Genitourinary:  Negative for dysuria, flank pain, frequency, hematuria and urgency.   Musculoskeletal:  Negative for arthralgias, back pain, joint swelling, myalgias, neck pain and neck stiffness.   Skin:  Negative for rash and wound.   Allergic/Immunologic: Negative for immunocompromised state.   Neurological:  Negative for dizziness, tremors, seizures, syncope, facial asymmetry, speech difficulty, weakness, light-headedness, numbness and headaches.        Bilateral neuropathy in legs and feet.   Hematological:  Negative for adenopathy. Does not bruise/bleed easily.   Psychiatric/Behavioral:  Negative for agitation, behavioral problems, confusion, dysphoric mood, hallucinations, self-injury, sleep disturbance and suicidal ideas. The patient is not nervous/anxious.        Objective   BP 93/68 (BP Location: Right arm, Patient Position: Sitting, BP Cuff Size: Large adult)   Pulse (!) 113   Temp 36.5 °C (97.7 °F) (Temporal)   Resp 18   Ht 1.829 m (6')   Wt 84.8 kg (187 lb)   SpO2 96%   BMI 25.36 kg/m²     Physical Exam  Constitutional:       Appearance: Normal appearance. He is normal weight.   HENT:      Head: Normocephalic and atraumatic.      Nose: No congestion.   Eyes:      Extraocular Movements: Extraocular movements intact.   Cardiovascular:      Rate and Rhythm: Regular rhythm.      Heart  "sounds: No murmur heard.     No friction rub. No gallop.   Pulmonary:      Effort: Pulmonary effort is normal. No respiratory distress.      Breath sounds: Normal breath sounds. No stridor. No wheezing or rhonchi.   Abdominal:      General: Abdomen is flat. There is no distension.      Palpations: There is no mass.      Tenderness: There is no abdominal tenderness.   Musculoskeletal:         General: Normal range of motion.      Cervical back: Normal range of motion and neck supple. No rigidity or tenderness.      Right lower leg: No edema.      Left lower leg: No edema.   Lymphadenopathy:      Cervical: No cervical adenopathy.   Skin:     General: Skin is warm.      Coloration: Skin is not jaundiced or pale.      Findings: No bruising or erythema.   Neurological:      General: No focal deficit present.      Mental Status: He is alert and oriented to person, place, and time. Mental status is at baseline.      Sensory: Sensory deficit present.      Comments: numbness bilateral feet and hands.   Psychiatric:         Mood and Affect: Mood normal.         Behavior: Behavior normal.         Assessment/Plan   Diagnoses and all orders for this visit:  Type 2 diabetes mellitus with diabetic polyneuropathy, with long-term current use of insulin (CMS/[facility])  -     insulin degludec (Tresiba FlexTouch) 200 unit/mL (3 mL) injection; Inject 10 Units under the skin once daily at bedtime. Take as directed per insulin instructions.  -     blood-glucose sensor (Dexcom G7 Sensor) device; 1 sensor applied every 10 days  -     Insulin, random; Future  -     Albumin , Urine Random; Future  -     CBC and Auto Differential; Future  -     Comprehensive Metabolic Panel; Future  -     Hemoglobin A1C; Future  -     Lipid Panel; Future  -     Magnesium; Future  -     TSH with reflex to Free T4 if abnormal; Future  -     Referral to Ophthalmology; Future  -     pen needle, diabetic 31 gauge x 5/16\" needle; Use to inject 1 times daily as " directed.  Primary hypertension  -     Follow Up In [facility] - PCP - Established  -     insulin degludec (Tresiba FlexTouch) 200 unit/mL (3 mL) injection; Inject 10 Units under the skin once daily at bedtime. Take as directed per insulin instructions.  -     Albumin , Urine Random; Future  -     CBC and Auto Differential; Future  -     Comprehensive Metabolic Panel; Future  -     Lipid Panel; Future  -     Magnesium; Future  -     TSH with reflex to Free T4 if abnormal; Future  Current moderate episode of major depressive disorder without prior episode (CMS/HCC)  -     Follow Up In [facility] - PCP - Established  Anxiety  -     Follow Up In [facility] - PCP - Established  Vitamin D deficiency  -     Vitamin D 25-Hydroxy,Total (for eval of Vitamin D levels); Future  -     cholecalciferol (Vitamin D3) 50 mcg (2,000 unit) capsule; Take 1 capsule (50 mcg) by mouth once daily.  -     ergocalciferol (Vitamin D-2) 1.25 MG (07152 UT) capsule; Take 1 capsule (50,000 Units) by mouth 1 (one) time per week.               Patient was identified as a fall risk. Risk prevention instructions provided.

## 2023-09-06 NOTE — PATIENT INSTRUCTIONS

## 2023-09-14 ENCOUNTER — TELEPHONE (OUTPATIENT)
Dept: PRIMARY CARE | Facility: CLINIC | Age: 63
End: 2023-09-14
Payer: MEDICAID

## 2023-10-31 ENCOUNTER — TELEPHONE (OUTPATIENT)
Dept: PRIMARY CARE | Facility: CLINIC | Age: 63
End: 2023-10-31
Payer: MEDICAID

## 2023-10-31 DIAGNOSIS — E11.42 TYPE 2 DIABETES MELLITUS WITH DIABETIC POLYNEUROPATHY, WITH LONG-TERM CURRENT USE OF INSULIN (MULTI): ICD-10-CM

## 2023-10-31 DIAGNOSIS — Z79.4 TYPE 2 DIABETES MELLITUS WITH DIABETIC POLYNEUROPATHY, WITH LONG-TERM CURRENT USE OF INSULIN (MULTI): ICD-10-CM

## 2023-10-31 NOTE — TELEPHONE ENCOUNTER
Patient called and stated his insurance won't cover Tresiba but it will cover Toujeo, Lantus or Cevemir.    Pharmacy is [facility] [place]

## 2023-11-20 ENCOUNTER — TELEPHONE (OUTPATIENT)
Dept: PRIMARY CARE | Facility: CLINIC | Age: 63
End: 2023-11-20
Payer: MEDICAID

## 2023-11-20 DIAGNOSIS — Z79.4 TYPE 2 DIABETES MELLITUS WITH DIABETIC POLYNEUROPATHY, WITH LONG-TERM CURRENT USE OF INSULIN (MULTI): ICD-10-CM

## 2023-11-20 DIAGNOSIS — E11.42 TYPE 2 DIABETES MELLITUS WITH DIABETIC POLYNEUROPATHY, WITH LONG-TERM CURRENT USE OF INSULIN (MULTI): ICD-10-CM

## 2023-11-20 RX ORDER — INSULIN GLARGINE 100 [IU]/ML
10 INJECTION, SOLUTION SUBCUTANEOUS NIGHTLY
Qty: 15 ML | Refills: 0 | Status: SHIPPED | OUTPATIENT
Start: 2023-11-20 | End: 2023-11-21 | Stop reason: SDUPTHER

## 2023-11-20 NOTE — TELEPHONE ENCOUNTER
Dr Avelar pt needs refill on test strips, his dexcom isn't working  One touch test strips, pt tests once daily  [facility]  pt can be reached at [phone]

## 2023-11-21 DIAGNOSIS — Z79.4 TYPE 2 DIABETES MELLITUS WITH DIABETIC POLYNEUROPATHY, WITH LONG-TERM CURRENT USE OF INSULIN (MULTI): ICD-10-CM

## 2023-11-21 DIAGNOSIS — E11.42 TYPE 2 DIABETES MELLITUS WITH DIABETIC POLYNEUROPATHY, WITH LONG-TERM CURRENT USE OF INSULIN (MULTI): ICD-10-CM

## 2023-11-21 RX ORDER — INSULIN GLARGINE 100 [IU]/ML
10 INJECTION, SOLUTION SUBCUTANEOUS NIGHTLY
Qty: 15 ML | Refills: 0 | Status: SHIPPED | OUTPATIENT
Start: 2023-11-21 | End: 2023-12-19 | Stop reason: SDUPTHER

## 2023-11-27 DIAGNOSIS — F32.1 CURRENT MODERATE EPISODE OF MAJOR DEPRESSIVE DISORDER WITHOUT PRIOR EPISODE (MULTI): ICD-10-CM

## 2023-11-27 DIAGNOSIS — F41.9 ANXIETY: ICD-10-CM

## 2023-11-28 RX ORDER — MIRTAZAPINE 7.5 MG/1
7.5 TABLET, FILM COATED ORAL NIGHTLY
Qty: 30 TABLET | Refills: 0 | Status: SHIPPED | OUTPATIENT
Start: 2023-11-28 | End: 2023-12-06 | Stop reason: SDUPTHER

## 2023-12-06 ENCOUNTER — OFFICE VISIT (OUTPATIENT)
Dept: PRIMARY CARE | Facility: CLINIC | Age: 63
End: 2023-12-06
Payer: MEDICAID

## 2023-12-06 VITALS
RESPIRATION RATE: 18 BRPM | TEMPERATURE: 97.5 F | HEIGHT: 72 IN | HEART RATE: 88 BPM | OXYGEN SATURATION: 98 % | DIASTOLIC BLOOD PRESSURE: 85 MMHG | SYSTOLIC BLOOD PRESSURE: 134 MMHG | BODY MASS INDEX: 26.28 KG/M2 | WEIGHT: 194 LBS

## 2023-12-06 DIAGNOSIS — F32.1 CURRENT MODERATE EPISODE OF MAJOR DEPRESSIVE DISORDER WITHOUT PRIOR EPISODE (MULTI): ICD-10-CM

## 2023-12-06 DIAGNOSIS — Z23 ENCOUNTER FOR IMMUNIZATION: ICD-10-CM

## 2023-12-06 DIAGNOSIS — F41.9 ANXIETY: ICD-10-CM

## 2023-12-06 DIAGNOSIS — E11.42 TYPE 2 DIABETES MELLITUS WITH DIABETIC POLYNEUROPATHY, WITH LONG-TERM CURRENT USE OF INSULIN (MULTI): Primary | ICD-10-CM

## 2023-12-06 DIAGNOSIS — Z79.4 TYPE 2 DIABETES MELLITUS WITH DIABETIC POLYNEUROPATHY, WITH LONG-TERM CURRENT USE OF INSULIN (MULTI): Primary | ICD-10-CM

## 2023-12-06 PROBLEM — I25.2 HISTORY OF NON-ST ELEVATION MYOCARDIAL INFARCTION (NSTEMI): Status: RESOLVED | Noted: 2017-12-30 | Resolved: 2023-12-06

## 2023-12-06 PROCEDURE — 3079F DIAST BP 80-89 MM HG: CPT | Performed by: FAMILY MEDICINE

## 2023-12-06 PROCEDURE — 99214 OFFICE O/P EST MOD 30 MIN: CPT | Performed by: FAMILY MEDICINE

## 2023-12-06 PROCEDURE — 90480 ADMN SARSCOV2 VAC 1/ONLY CMP: CPT | Performed by: FAMILY MEDICINE

## 2023-12-06 PROCEDURE — 90471 IMMUNIZATION ADMIN: CPT | Performed by: FAMILY MEDICINE

## 2023-12-06 PROCEDURE — 4010F ACE/ARB THERAPY RXD/TAKEN: CPT | Performed by: FAMILY MEDICINE

## 2023-12-06 PROCEDURE — 90686 IIV4 VACC NO PRSV 0.5 ML IM: CPT | Performed by: FAMILY MEDICINE

## 2023-12-06 PROCEDURE — 90677 PCV20 VACCINE IM: CPT | Performed by: FAMILY MEDICINE

## 2023-12-06 PROCEDURE — 91322 SARSCOV2 VAC 50 MCG/0.5ML IM: CPT | Performed by: FAMILY MEDICINE

## 2023-12-06 PROCEDURE — 90472 IMMUNIZATION ADMIN EACH ADD: CPT | Performed by: FAMILY MEDICINE

## 2023-12-06 PROCEDURE — 3075F SYST BP GE 130 - 139MM HG: CPT | Performed by: FAMILY MEDICINE

## 2023-12-06 RX ORDER — METFORMIN HYDROCHLORIDE 500 MG/1
500 TABLET ORAL
Qty: 30 TABLET | Refills: 11 | Status: SHIPPED | OUTPATIENT
Start: 2023-12-06 | End: 2024-01-26 | Stop reason: DRUGHIGH

## 2023-12-06 RX ORDER — MIRTAZAPINE 7.5 MG/1
7.5 TABLET, FILM COATED ORAL NIGHTLY
Qty: 30 TABLET | Refills: 3 | Status: SHIPPED | OUTPATIENT
Start: 2023-12-06 | End: 2024-04-22 | Stop reason: SDUPTHER

## 2023-12-06 RX ORDER — ROSUVASTATIN CALCIUM 5 MG/1
2.5 TABLET, COATED ORAL DAILY
Qty: 15 TABLET | Refills: 11 | Status: SHIPPED | OUTPATIENT
Start: 2023-12-06 | End: 2023-12-19 | Stop reason: SDUPTHER

## 2023-12-06 RX ORDER — LISINOPRIL 2.5 MG/1
1.25 TABLET ORAL DAILY
Qty: 15 TABLET | Refills: 11 | Status: SHIPPED | OUTPATIENT
Start: 2023-12-06 | End: 2024-12-05

## 2023-12-06 ASSESSMENT — ENCOUNTER SYMPTOMS
EYE REDNESS: 0
ADENOPATHY: 0
ACTIVITY CHANGE: 0
VOICE CHANGE: 0
BRUISES/BLEEDS EASILY: 0
NUMBNESS: 0
UNEXPECTED WEIGHT CHANGE: 0
FATIGUE: 0
EYE DISCHARGE: 0
CHILLS: 0
AGITATION: 0
SLEEP DISTURBANCE: 0
CONFUSION: 0
LIGHT-HEADEDNESS: 0
DIAPHORESIS: 0
HALLUCINATIONS: 0
ABDOMINAL PAIN: 0
WOUND: 0
COUGH: 0
NERVOUS/ANXIOUS: 0
EYE PAIN: 0
DYSPHORIC MOOD: 0
CHEST TIGHTNESS: 0
TROUBLE SWALLOWING: 0
PALPITATIONS: 0
SEIZURES: 0
VOMITING: 0
FLANK PAIN: 0
HEADACHES: 0
APPETITE CHANGE: 0
NAUSEA: 0
SINUS PAIN: 0
BLOOD IN STOOL: 0
RHINORRHEA: 0
CONSTIPATION: 0
FREQUENCY: 0
EYE ITCHING: 0
WEIGHT LOSS: 0
SORE THROAT: 0
SHORTNESS OF BREATH: 0
TREMORS: 0
FEVER: 0
DIARRHEA: 0
HEMATURIA: 0
POLYDIPSIA: 0
SINUS PRESSURE: 0
ARTHRALGIAS: 0
DIZZINESS: 0
DYSURIA: 0
PHOTOPHOBIA: 0
WHEEZING: 0
JOINT SWELLING: 0
WEAKNESS: 0
NECK PAIN: 0
FACIAL ASYMMETRY: 0
SPEECH DIFFICULTY: 0
MYALGIAS: 0
ABDOMINAL DISTENTION: 0
BACK PAIN: 0
NECK STIFFNESS: 0
CHOKING: 0

## 2023-12-06 NOTE — PROGRESS NOTES
Subjective   Patient ID: Gunner Dodge is a 63 y.o. male who presents for 3 monthy check up (And review labs ).          Diabetes  He presents for his follow-up diabetic visit. He has type 2 diabetes mellitus. The initial diagnosis of diabetes was made 13 years ago. His disease course has been stable. Pertinent negatives for hypoglycemia include no confusion, dizziness, headaches, nervousness/anxiousness, seizures, speech difficulty or tremors. Associated symptoms include foot paresthesias. Pertinent negatives for diabetes include no chest pain, no fatigue, no polydipsia, no polyuria, no weakness and no weight loss. (Loss of sensation in both feet.) Symptoms are stable. Diabetic complications include heart disease and peripheral neuropathy. Risk factors for coronary artery disease include diabetes mellitus. Current diabetic treatment includes insulin injections. He is compliant with treatment most of the time. His weight is fluctuating minimally. He is following a generally healthy diet. When asked about meal planning, he reported none. He has not had a previous visit with a dietitian. He never participates in exercise. There is no change in his home blood glucose trend. An ACE inhibitor/angiotensin II receptor blocker is not being taken. He does not see a podiatrist.Eye exam is not current.   Hyperlipidemia  This is a chronic problem. The current episode started more than 1 year ago. The problem is controlled. Recent lipid tests were reviewed and are normal. Exacerbating diseases include diabetes. There are no known factors aggravating his hyperlipidemia. Pertinent negatives include no chest pain, myalgias or shortness of breath. He is currently on no antihyperlipidemic treatment. Compliance problems include adherence to exercise and adherence to diet.             Review of Systems   Constitutional:  Negative for activity change, appetite change, chills, diaphoresis, fatigue, fever, unexpected weight change and  weight loss.   HENT:  Negative for congestion, ear discharge, ear pain, hearing loss, nosebleeds, postnasal drip, rhinorrhea, sinus pressure, sinus pain, sneezing, sore throat, tinnitus, trouble swallowing and voice change.    Eyes:  Negative for photophobia, pain, discharge, redness, itching and visual disturbance.   Respiratory:  Negative for cough, choking, chest tightness, shortness of breath and wheezing.    Cardiovascular:  Negative for chest pain, palpitations and leg swelling.   Gastrointestinal:  Negative for abdominal distention, abdominal pain, blood in stool, constipation, diarrhea, nausea and vomiting.   Endocrine: Negative for cold intolerance, heat intolerance, polydipsia and polyuria.   Genitourinary:  Negative for dysuria, flank pain, frequency, hematuria and urgency.   Musculoskeletal:  Negative for arthralgias, back pain, joint swelling, myalgias, neck pain and neck stiffness.   Skin:  Negative for rash and wound.   Allergic/Immunologic: Negative for immunocompromised state.   Neurological:  Negative for dizziness, tremors, seizures, syncope, facial asymmetry, speech difficulty, weakness, light-headedness, numbness and headaches.        Bilateral neuropathy in legs and feet.   Hematological:  Negative for adenopathy. Does not bruise/bleed easily.   Psychiatric/Behavioral:  Negative for agitation, behavioral problems, confusion, dysphoric mood, hallucinations, self-injury, sleep disturbance and suicidal ideas. The patient is not nervous/anxious.        Objective   /85 (BP Location: Right arm, Patient Position: Sitting, BP Cuff Size: Large adult)   Pulse 88   Temp 36.4 °C (97.5 °F) (Temporal)   Resp 18   Ht 1.829 m (6')   Wt 88 kg (194 lb)   SpO2 98%   BMI 26.31 kg/m²     Physical Exam  Constitutional:       Appearance: Normal appearance. He is normal weight.   HENT:      Head: Normocephalic and atraumatic.      Nose: No congestion.   Eyes:      Extraocular Movements: Extraocular  movements intact.   Cardiovascular:      Rate and Rhythm: Regular rhythm.      Heart sounds: No murmur heard.     No friction rub. No gallop.   Pulmonary:      Effort: Pulmonary effort is normal. No respiratory distress.      Breath sounds: Normal breath sounds. No stridor. No wheezing or rhonchi.   Abdominal:      General: Abdomen is flat. There is no distension.      Palpations: There is no mass.      Tenderness: There is no abdominal tenderness.   Musculoskeletal:         General: Normal range of motion.      Cervical back: Normal range of motion and neck supple. No rigidity or tenderness.      Right lower leg: No edema.      Left lower leg: No edema.   Lymphadenopathy:      Cervical: No cervical adenopathy.   Skin:     General: Skin is warm.      Coloration: Skin is not jaundiced or pale.      Findings: No bruising or erythema.   Neurological:      General: No focal deficit present.      Mental Status: He is alert and oriented to person, place, and time. Mental status is at baseline.      Sensory: Sensory deficit present.      Comments: numbness bilateral feet and hands.   Psychiatric:         Mood and Affect: Mood normal.         Behavior: Behavior normal.         Assessment/Plan   Diagnoses and all orders for this visit:  Type 2 diabetes mellitus with diabetic polyneuropathy, with long-term current use of insulin (CMS/[facility])  -     metFORMIN (Glucophage) 500 mg tablet; Take 1 tablet (500 mg) by mouth once daily with a meal.  -     Follow Up In [facility] - Pharmacy; Future  -     lisinopril 2.5 mg tablet; Take 0.5 tablets (1.25 mg) by mouth once daily.  -     rosuvastatin (Crestor) 5 mg tablet; Take 0.5 tablets (2.5 mg) by mouth once daily.  -     Albumin , Urine Random; Future  -     CBC and Auto Differential; Future  -     Comprehensive Metabolic Panel; Future  -     Hemoglobin A1C; Future  -     Lipid Panel; Future  -     Magnesium; Future  -     TSH with reflex to Free T4 if abnormal; Future  -      Follow Up In [facility] - PCP - Established; Future  -     Referral to Ophthalmology; Future  Current moderate episode of major depressive disorder without prior episode (CMS/HCC)  -     mirtazapine (Remeron) 7.5 mg tablet; Take 1 tablet (7.5 mg) by mouth once daily at bedtime.  -     Follow Up In [facility] - PCP - Established; Future  Anxiety  -     mirtazapine (Remeron) 7.5 mg tablet; Take 1 tablet (7.5 mg) by mouth once daily at bedtime.  -     Follow Up In [facility] - PCP - Established; Future  Encounter for immunization  -     Flu vaccine (IIV4) age 6 months and greater, preservative free  -     Moderna COVID-19 vaccine, 4563-2346, monovalent, age 12 years and older, (50mcg/0.5mL)  -     Pneumococcal conjugate vaccine, 20-valent (PREVNAR 20)  -     diphth,pertus,acell,,tetanus (BoostRIX) 2.5-8-5 Lf-mcg-Lf/0.5mL injection; Inject 0.5 mL into the muscle 1 time for 1 dose.  -     zoster vaccine-recombinant adjuvanted (Shingrix) 50 mcg/0.5 mL vaccine; Inject 0.5 mL into the muscle 1 time for 1 dose.  -     Follow Up In [facility] - PCP - Established; Future           Patient was identified as a fall risk. Risk prevention instructions provided.

## 2023-12-18 NOTE — PROGRESS NOTES
Patient ID: Gunner Dodge is a 63 y.o. male who presents for No chief complaint on file..    Referring Provider: Mau Avelar DO   Pt is here for first appointment. Last visit with PCP: 12.6.23    Verbal consent to manage patient's drug therapy was obtained from patient. They were informed they may decline to participate or withdraw from participation in pharmacy services at any time. Patient is agreeable to detailed voice messages if unable to be reached.     Does patient see endocrine? No, pt denies  Who are the patient's medications managed by? PCP    States he recently started seeing providers again. Last visits were in 2020.      Subjective   Treatment Adherence:   Patient did take medications today.   Number of missed doses in last 7 days: none.  Why? N/A     Preferred pharmacy:  in [place]  Can patient afford prescribed medications: Yes, no issues reported     Current exercise: no formal exercise; very painful neuropathy so hard to walk, stopped driving about a year ago    Current diet: tries to avoid sugary drinks; does have SNAP card from medicare for shopping  Breakfast: bagel with butter with tomato juice or milk (at about 2pm)  Lunch: skips  Dinner: chicken breast, mashed potatoes; typically meat + potatoes   Snacks: sugar free jello, grapes  Drinks: water and coffee (black), occasionally diet pepsi      HPI  DIABETES MELLITUS TYPE 2:  Diagnosed with diabetes:  10-12 years per pt. Known diabetic complications: polyneuropathy  Last optometry exam: early 2020  Most recent visit in Podiatry: none-- patient denies sores or cuts on feet today      Current diabetic medications include:  Metformin 500mg daily  Lantus 12 units at bedtime      Patient tests SMBGS once daily  Home blood glucose records (mg/dL)  FBG: low 200s      Any episodes of hypoglycemia? No, pt denies .  Did patient treat episode of hypoglycemia appropriately? N/A  Does pt have proteinuria? No If appropriate, is patient on  "ACEi/ARB? N/A      Secondary Prevention  Statin? Yes - LDL 81 on 12.4.23   ACE-I/ARB? Yes  Aspirin? Yes    Pertinent PMH Review:  PMH of Pancreatitis: No  PMH of Retinopathy: No  PMH of Urinary Tract Infections: No  PMH of MTC: No  CAD, Afib with pacemaker          Review of Systems    Objective     There were no vitals taken for this visit.     Labs - see media tab for results  No results found for: \"BILITOT\", \"CALCIUM\", \"CO2\", \"CL\", \"CREATININE\", \"GLUCOSE\", \"ALKPHOS\", \"K\", \"PROT\", \"NA\", \"AST\", \"ALT\", \"BUN\", \"ANIONGAP\", \"MG\", \"PHOS\", \"GGT\", \"LDH\", \"ALBUMIN\", \"AMYLASE\", \"LIPASE\", \"GFRF\", \"GFRMALE\"  No results found for: \"TRIG\", \"CHOL\", \"LDLCALC\", \"HDL\"  Lab Results   Component Value Date    HGBA1C 12.5 (H) 08/03/2020 12.4.2023:  A1c = 11.1%  LDL = 81mg/dL  eGFR = 105    Current Outpatient Medications   Medication Instructions    aspirin 81 mg, oral, Daily    blood-glucose sensor (Dexcom G7 Sensor) device 1 sensor applied every 10 days    cholecalciferol (VITAMIN D3) 50 mcg, oral, Daily    Lantus Solostar U-100 Insulin 10 Units, subcutaneous, Nightly, Take as directed per insulin instructions.    lisinopril 1.25 mg, oral, Daily    metFORMIN (GLUCOPHAGE) 500 mg, oral, Daily with breakfast    mirtazapine (REMERON) 7.5 mg, oral, Nightly    pen needle, diabetic 31 gauge x 5/16\" needle Use to inject 1 times daily as directed.    polyethylene glycol (GLYCOLAX, MIRALAX) 17 g, oral, Daily    rosuvastatin (CRESTOR) 2.5 mg, oral, Daily        Assessment/Plan   Problem List Items Addressed This Visit             ICD-10-CM    Type 2 diabetes mellitus with diabetic polyneuropathy, with long-term current use of insulin (CMS/[facility]) E11.42, Z79.4     Patient identified self-management goal:  get A1c to goal  Patient's goal A1c is < 7%.  Is pt at goal? No, 11.1% on 12.4.23  Patient's SMBGs are above goal.   Rationale for plan: Patient uncontrolled and recently restarted DM therapy. Increase lantus by ~20%. Will also plan to titrate " metformin back up to max dose.    Rosuvastatin was increased to 5mg so patient is on moderate intensity and to limit need to cut tablets in half (difficult with neuropathy and small tablets).    Medication Changes:  CONTINUE:  Metformin 500mg daily    INCREASE  Lantus to 14 units QHS      Monitoring and Education:  Diabetes Education  Rule of 15: eating ~15 g of carbs when BG less than 80 (half cup juice, 3-4 glucose tabs).  Recognize symptoms of high and low blood sugar.   Eat a realistic healthy diet consisting of fruits, vegetables, fiber, protein food choices on a regular basis and be aware of portion/serving sizes. Reduce carbohydrate consumption and always consume with protein and fat. Avoid foods high in saturated/trans fat, high salt content, and sweets and beverages with added sugars.  Limit alcohol consumption; alcohol may affect your blood sugar and cause hypoglycemia.   Stay active and incorporate ~30 mins of exercise into your daily routine to manage your weight and increase the body's acceptance of insulin.    PATIENT GOALS   Fasting B - 130 mg/dL 2-HR Postprandial BG:   Less than 180 mg/dL A1c:   Less than 7.0 %            Relevant Medications    rosuvastatin (Crestor) 5 mg tablet    Lantus Solostar U-100 Insulin 100 unit/mL (3 mL) pen    Other Relevant Orders    Follow Up In [facility] - Pharmacy       Labs ordered:  none - see media tab for results        Referrals:  none     Follow-up:  at 3pm via telephone with Goldie     Time spent with pt: Total length of time 50 (minutes) of the encounter and more than 50% was spent counseling the patient.      Goldie Bain PharmD, KASIA  Clinical Pharmacist  Pharmacy Services  [phone]    Continue all meds under the continuation of care with the referring provider and clinical pharmacy team.    Verbal consent to manage patient's drug therapy was obtained from the patient. They were informed they may decline to participate  or withdraw from participation in pharmacy services at any time.

## 2023-12-19 ENCOUNTER — TELEMEDICINE (OUTPATIENT)
Dept: PHARMACY | Facility: HOSPITAL | Age: 63
End: 2023-12-19
Payer: MEDICAID

## 2023-12-19 DIAGNOSIS — E11.42 TYPE 2 DIABETES MELLITUS WITH DIABETIC POLYNEUROPATHY, WITH LONG-TERM CURRENT USE OF INSULIN (MULTI): ICD-10-CM

## 2023-12-19 DIAGNOSIS — Z79.4 TYPE 2 DIABETES MELLITUS WITH DIABETIC POLYNEUROPATHY, WITH LONG-TERM CURRENT USE OF INSULIN (MULTI): ICD-10-CM

## 2023-12-19 RX ORDER — INSULIN GLARGINE 100 [IU]/ML
14 INJECTION, SOLUTION SUBCUTANEOUS NIGHTLY
Qty: 15 ML | Refills: 2 | Status: SHIPPED | OUTPATIENT
Start: 2023-12-19 | End: 2024-01-26 | Stop reason: SDUPTHER

## 2023-12-19 RX ORDER — ROSUVASTATIN CALCIUM 5 MG/1
5 TABLET, COATED ORAL DAILY
Qty: 30 TABLET | Refills: 11 | Status: SHIPPED | OUTPATIENT
Start: 2023-12-19 | End: 2024-06-05 | Stop reason: SDUPTHER

## 2023-12-19 NOTE — ASSESSMENT & PLAN NOTE
Patient identified self-management goal:  get A1c to goal  Patient's goal A1c is < 7%.  Is pt at goal? No, 11.1% on 23  Patient's SMBGs are above goal.   Rationale for plan: Patient uncontrolled and recently restarted DM therapy. Increase lantus by ~20%. Will also plan to titrate metformin back up to max dose.    Rosuvastatin was increased to 5mg so patient is on moderate intensity and to limit need to cut tablets in half (difficult with neuropathy and small tablets).    Medication Changes:  CONTINUE:  Metformin 500mg daily    INCREASE  Lantus to 14 units QHS      Monitoring and Education:  Diabetes Education  Rule of 15: eating ~15 g of carbs when BG less than 80 (half cup juice, 3-4 glucose tabs).  Recognize symptoms of high and low blood sugar.   Eat a realistic healthy diet consisting of fruits, vegetables, fiber, protein food choices on a regular basis and be aware of portion/serving sizes. Reduce carbohydrate consumption and always consume with protein and fat. Avoid foods high in saturated/trans fat, high salt content, and sweets and beverages with added sugars.  Limit alcohol consumption; alcohol may affect your blood sugar and cause hypoglycemia.   Stay active and incorporate ~30 mins of exercise into your daily routine to manage your weight and increase the body's acceptance of insulin.    PATIENT GOALS   Fasting B - 130 mg/dL 2-HR Postprandial BG:   Less than 180 mg/dL A1c:   Less than 7.0 %

## 2024-01-26 ENCOUNTER — TELEMEDICINE (OUTPATIENT)
Dept: PHARMACY | Facility: HOSPITAL | Age: 64
End: 2024-01-26
Payer: MEDICAID

## 2024-01-26 DIAGNOSIS — E11.42 TYPE 2 DIABETES MELLITUS WITH DIABETIC POLYNEUROPATHY, WITH LONG-TERM CURRENT USE OF INSULIN (MULTI): ICD-10-CM

## 2024-01-26 DIAGNOSIS — Z79.4 TYPE 2 DIABETES MELLITUS WITH DIABETIC POLYNEUROPATHY, WITH LONG-TERM CURRENT USE OF INSULIN (MULTI): ICD-10-CM

## 2024-01-26 RX ORDER — METFORMIN HYDROCHLORIDE 500 MG/1
2000 TABLET, EXTENDED RELEASE ORAL
Qty: 120 TABLET | Refills: 11 | Status: SHIPPED | OUTPATIENT
Start: 2024-01-26 | End: 2025-01-25

## 2024-01-26 RX ORDER — INSULIN GLARGINE 100 [IU]/ML
14 INJECTION, SOLUTION SUBCUTANEOUS NIGHTLY
Qty: 15 ML | Refills: 2 | Status: SHIPPED | OUTPATIENT
Start: 2024-01-26

## 2024-01-26 NOTE — PROGRESS NOTES
Patient ID: Gunner Dodge is a 63 y.o. male who presents for Diabetes.    Referring Provider: Mau Avelar DO   Pt is here for first appointment. Last visit with PCP: 12.6.23    Verbal consent to manage patient's drug therapy was obtained from patient. They were informed they may decline to participate or withdraw from participation in pharmacy services at any time. Patient is agreeable to detailed voice messages if unable to be reached.     Does patient see endocrine? No, pt denies  Who are the patient's medications managed by? PCP    States he recently started seeing providers again. Last visits were in 2020.      Subjective   Treatment Adherence:   Patient did take medications today.   Number of missed doses in last 7 days: none.  Why? N/A     Preferred pharmacy:  in [place]  Can patient afford prescribed medications: Yes, no issues reported     Current exercise: no formal exercise; very painful neuropathy so hard to walk, stopped driving about a year ago    Current diet: tries to avoid sugary drinks; does have SNAP card from medicare for shopping  Breakfast: bagel with butter with tomato juice or milk (at about 2pm)  Lunch: skips  Dinner: chicken breast, mashed potatoes; typically meat + potatoes   Snacks: sugar free jello, grapes  Drinks: water and coffee (black), occasionally diet pepsi      HPI  DIABETES MELLITUS TYPE 2:  Diagnosed with diabetes:  10-12 years per pt. Known diabetic complications: polyneuropathy  Last optometry exam: early 2020  Most recent visit in Podiatry: none-- patient denies sores or cuts on feet today      Current diabetic medications include:  Metformin 500mg daily  Lantus 14 units at bedtime      Patient tests SMBGS once daily  Home blood glucose records (mg/dL)  FBG: 160-170s  FBG: low 200s      Any episodes of hypoglycemia? No, pt denies .  Did patient treat episode of hypoglycemia appropriately? N/A  Does pt have proteinuria? No If appropriate, is patient on ACEi/ARB?  "N/A      Secondary Prevention  Statin? Yes - LDL 81 on 12.4.23   ACE-I/ARB? Yes  Aspirin? Yes    Pertinent PMH Review:  PMH of Pancreatitis: No  PMH of Retinopathy: No  PMH of Urinary Tract Infections: No  PMH of MTC: No  CAD, Afib with pacemaker          Review of Systems    Objective     There were no vitals taken for this visit.     Labs - see media tab for results  No results found for: \"BILITOT\", \"CALCIUM\", \"CO2\", \"CL\", \"CREATININE\", \"GLUCOSE\", \"ALKPHOS\", \"K\", \"PROT\", \"NA\", \"AST\", \"ALT\", \"BUN\", \"ANIONGAP\", \"MG\", \"PHOS\", \"GGT\", \"LDH\", \"ALBUMIN\", \"AMYLASE\", \"LIPASE\", \"GFRF\", \"GFRMALE\"  No results found for: \"TRIG\", \"CHOL\", \"LDLCALC\", \"HDL\"  Lab Results   Component Value Date    HGBA1C 12.5 (H) 08/03/2020 12.4.2023:  A1c = 11.1%  LDL = 81mg/dL  eGFR = 105    Current Outpatient Medications   Medication Instructions    aspirin 81 mg, oral, Daily    cholecalciferol (VITAMIN D3) 50 mcg, oral, Daily    Lantus Solostar U-100 Insulin 14 Units, subcutaneous, Nightly, Take as directed per insulin instructions.    lisinopril 1.25 mg, oral, Daily    metFORMIN (GLUCOPHAGE) 500 mg, oral, Daily with breakfast    mirtazapine (REMERON) 7.5 mg, oral, Nightly    pen needle, diabetic 31 gauge x 5/16\" needle Use to inject 1 times daily as directed.    polyethylene glycol (GLYCOLAX, MIRALAX) 17 g, oral, Daily    rosuvastatin (CRESTOR) 5 mg, oral, Daily        Assessment/Plan   Problem List Items Addressed This Visit             ICD-10-CM    Type 2 diabetes mellitus with diabetic polyneuropathy, with long-term current use of insulin (CMS/[facility]) E11.42, Z79.4     Patient identified self-management goal:  get A1c to goal  Patient's goal A1c is < 7%.  Is pt at goal? No, 11.1% on 12.4.23  Patient's SMBGs are above goal but improved  Rationale for plan: Titrate metformin back up to max dose. Will assess for additional insulin needs at next appt pending next SMBGs.    Medication Changes:  CONTINUE:  Lantus to 14 units " QHS    INCREASE  Metformin to 500mg BID x 3 days then 500mg QAM and 1G QPM x 3 days then 1G BID      Monitoring and Education:  Diabetes Education  Rule of 15: eating ~15 g of carbs when BG less than 80 (half cup juice, 3-4 glucose tabs).  Recognize symptoms of high and low blood sugar.   Eat a realistic healthy diet consisting of fruits, vegetables, fiber, protein food choices on a regular basis and be aware of portion/serving sizes. Reduce carbohydrate consumption and always consume with protein and fat. Avoid foods high in saturated/trans fat, high salt content, and sweets and beverages with added sugars.  Limit alcohol consumption; alcohol may affect your blood sugar and cause hypoglycemia.   Stay active and incorporate ~30 mins of exercise into your daily routine to manage your weight and increase the body's acceptance of insulin.    PATIENT GOALS   Fasting B - 130 mg/dL 2-HR Postprandial BG:   Less than 180 mg/dL A1c:   Less than 7.0 %             Labs ordered:  none - see media tab for results     Referrals:  none     Follow-up:  at 1430 via telephone with Goldie     Time spent with pt: Total length of time 20 (minutes) of the encounter and more than 50% was spent counseling the patient.      Goldie Bain PharmD, KASIA  Clinical Pharmacist  Pharmacy Services  [phone]    Continue all meds under the continuation of care with the referring provider and clinical pharmacy team.    Verbal consent to manage patient's drug therapy was obtained from the patient. They were informed they may decline to participate or withdraw from participation in pharmacy services at any time.

## 2024-01-26 NOTE — ASSESSMENT & PLAN NOTE
Patient identified self-management goal:  get A1c to goal  Patient's goal A1c is < 7%.  Is pt at goal? No, 11.1% on 23  Patient's SMBGs are above goal but improved  Rationale for plan: Titrate metformin back up to max dose. Will assess for additional insulin needs at next appt pending next SMBGs.    Medication Changes:  CONTINUE:  Lantus to 14 units QHS    INCREASE  Metformin to 500mg BID x 3 days then 500mg QAM and 1G QPM x 3 days then 1G BID      Monitoring and Education:  Diabetes Education  Rule of 15: eating ~15 g of carbs when BG less than 80 (half cup juice, 3-4 glucose tabs).  Recognize symptoms of high and low blood sugar.   Eat a realistic healthy diet consisting of fruits, vegetables, fiber, protein food choices on a regular basis and be aware of portion/serving sizes. Reduce carbohydrate consumption and always consume with protein and fat. Avoid foods high in saturated/trans fat, high salt content, and sweets and beverages with added sugars.  Limit alcohol consumption; alcohol may affect your blood sugar and cause hypoglycemia.   Stay active and incorporate ~30 mins of exercise into your daily routine to manage your weight and increase the body's acceptance of insulin.    PATIENT GOALS   Fasting B - 130 mg/dL 2-HR Postprandial BG:   Less than 180 mg/dL A1c:   Less than 7.0 %

## 2024-02-16 ENCOUNTER — TELEMEDICINE (OUTPATIENT)
Dept: PHARMACY | Facility: HOSPITAL | Age: 64
End: 2024-02-16
Payer: MEDICAID

## 2024-02-16 DIAGNOSIS — E11.42 TYPE 2 DIABETES MELLITUS WITH DIABETIC POLYNEUROPATHY, WITH LONG-TERM CURRENT USE OF INSULIN (MULTI): ICD-10-CM

## 2024-02-16 DIAGNOSIS — Z79.4 TYPE 2 DIABETES MELLITUS WITH DIABETIC POLYNEUROPATHY, WITH LONG-TERM CURRENT USE OF INSULIN (MULTI): ICD-10-CM

## 2024-02-16 NOTE — ASSESSMENT & PLAN NOTE
Patient identified self-management goal:  get A1c to goal  Patient's goal A1c is < 7%.  Is pt at goal? No, 11.1% on 23  Patient's SMBGs are above goal but improved  Rationale for plan: Tolerating highest dose of metformin. Sugars are improved. Will allow about 6 weeks for highest dose of metformin and next set of labs before adjusting regimen.     Would like to use either SGLT2 or GLP1 for cardiorenal protection.     Medication Changes:  CONTINUE:  Lantus to 14 units QHS  Metformin 1G BID      Monitoring and Education:  Diabetes Education  Rule of 15: eating ~15 g of carbs when BG less than 80 (half cup juice, 3-4 glucose tabs).  Recognize symptoms of high and low blood sugar.   Eat a realistic healthy diet consisting of fruits, vegetables, fiber, protein food choices on a regular basis and be aware of portion/serving sizes. Reduce carbohydrate consumption and always consume with protein and fat. Avoid foods high in saturated/trans fat, high salt content, and sweets and beverages with added sugars.  Limit alcohol consumption; alcohol may affect your blood sugar and cause hypoglycemia.   Stay active and incorporate ~30 mins of exercise into your daily routine to manage your weight and increase the body's acceptance of insulin.    PATIENT GOALS   Fasting B - 130 mg/dL 2-HR Postprandial BG:   Less than 180 mg/dL A1c:   Less than 7.0 %

## 2024-02-16 NOTE — PROGRESS NOTES
Patient ID: Gunner Dodge is a 63 y.o. male who presents for Diabetes.    Referring Provider: Mau Avelar DO   Pt is here for follow up appointment. Last visit with PCP: 23    Verbal consent to manage patient's drug therapy was obtained from patient. They were informed they may decline to participate or withdraw from participation in pharmacy services at any time. Patient is agreeable to detailed voice messages if unable to be reached.     Does patient see endocrine? No, pt denies  Who are the patient's medications managed by? PCP      INTERVAL HISTORY:  Got social security worked out - good place financially now  Recently started following with providers again; previously followed in 2020.      Subjective   Treatment Adherence:   Patient did take medications today.   Number of missed doses in last 7 days: none.  Why? N/A     Preferred pharmacy:  in [place]  Can patient afford prescribed medications: Yes, no issues reported     Current exercise: no formal exercise; very painful neuropathy so hard to walk, stopped driving about a year ago    Current diet: tries to avoid sugary drinks; does have SNAP card from medicare for shopping  Breakfast: bagel with butter with tomato juice or milk (at about 2pm)  Lunch: skips  Dinner: chicken breast, mashed potatoes; typically meat + potatoes   Snacks: sugar free jello, grapes  Drinks: water and coffee (black), occasionally diet pepsi      HPI  DIABETES MELLITUS TYPE 2:  Diagnosed with diabetes:  10-12 years per pt. Known diabetic complications: polyneuropathy  Last optometry exam: early   Most recent visit in Podiatry: none-- patient denies sores or cuts on feet today      Current diabetic medications include:  Metformin 1G BID  Lantus 14 units at bedtime      Patient tests SMBGS once daily has forgotten to check last few days d/t worrying about pills  Home blood glucose records (mg/dL)  FBs  FBG: 160-170s  FBG: low 200s      Any episodes of  "hypoglycemia? No, pt denies .  Did patient treat episode of hypoglycemia appropriately? N/A  Does pt have proteinuria? No If appropriate, is patient on ACEi/ARB? N/A      Secondary Prevention  Statin? Yes - LDL 81 on 12.4.23   ACE-I/ARB? Yes  Aspirin? Yes    Pertinent PMH Review:  PMH of Pancreatitis: No  PMH of Retinopathy: No  PMH of Urinary Tract Infections: No  PMH of MTC: No  CAD, Afib with pacemaker        Review of Systems    Objective     There were no vitals taken for this visit.     Labs - see media tab for results  No results found for: \"BILITOT\", \"CALCIUM\", \"CO2\", \"CL\", \"CREATININE\", \"GLUCOSE\", \"ALKPHOS\", \"K\", \"PROT\", \"NA\", \"AST\", \"ALT\", \"BUN\", \"ANIONGAP\", \"MG\", \"PHOS\", \"GGT\", \"LDH\", \"ALBUMIN\", \"AMYLASE\", \"LIPASE\", \"GFRF\", \"GFRMALE\"  No results found for: \"TRIG\", \"CHOL\", \"LDLCALC\", \"HDL\"  Lab Results   Component Value Date    HGBA1C 12.5 (H) 08/03/2020 12.4.2023:  A1c = 11.1%  LDL = 81mg/dL  eGFR = 105    Current Outpatient Medications   Medication Instructions    aspirin 81 mg, oral, Daily    cholecalciferol (VITAMIN D3) 50 mcg, oral, Daily    Lantus Solostar U-100 Insulin 14 Units, subcutaneous, Nightly, Take as directed per insulin instructions.    lisinopril 1.25 mg, oral, Daily    metFORMIN XR (GLUCOPHAGE-XR) 2,000 mg, oral, Daily with breakfast, Do not crush, chew, or split.    mirtazapine (REMERON) 7.5 mg, oral, Nightly    pen needle, diabetic 31 gauge x 5/16\" needle Use to inject 1 times daily as directed.    polyethylene glycol (GLYCOLAX, MIRALAX) 17 g, oral, Daily    rosuvastatin (CRESTOR) 5 mg, oral, Daily        Assessment/Plan   Problem List Items Addressed This Visit             ICD-10-CM    Type 2 diabetes mellitus with diabetic polyneuropathy, with long-term current use of insulin (CMS/[facility]) E11.42, Z79.4     Patient identified self-management goal:  get A1c to goal  Patient's goal A1c is < 7%.  Is pt at goal? No, 11.1% on 12.4.23  Patient's SMBGs are above goal but improved  Rationale " for plan: Tolerating highest dose of metformin. Sugars are improved. Will allow about 6 weeks for highest dose of metformin and next set of labs before adjusting regimen.     Would like to use either SGLT2 or GLP1 for cardiorenal protection.     Medication Changes:  CONTINUE:  Lantus to 14 units QHS  Metformin 1G BID      Monitoring and Education:  Diabetes Education  Rule of 15: eating ~15 g of carbs when BG less than 80 (half cup juice, 3-4 glucose tabs).  Recognize symptoms of high and low blood sugar.   Eat a realistic healthy diet consisting of fruits, vegetables, fiber, protein food choices on a regular basis and be aware of portion/serving sizes. Reduce carbohydrate consumption and always consume with protein and fat. Avoid foods high in saturated/trans fat, high salt content, and sweets and beverages with added sugars.  Limit alcohol consumption; alcohol may affect your blood sugar and cause hypoglycemia.   Stay active and incorporate ~30 mins of exercise into your daily routine to manage your weight and increase the body's acceptance of insulin.    PATIENT GOALS   Fasting B - 130 mg/dL 2-HR Postprandial BG:   Less than 180 mg/dL A1c:   Less than 7.0 %             Labs ordered:  none - see media tab for results     Referrals:  none     Follow-up:  at 1430 via telephone with Goldie     Time spent with pt: Total length of time 20 (minutes) of the encounter and more than 50% was spent counseling the patient.      Goldie Bain, PharmD, KASIA  Clinical Pharmacist  Pharmacy Services  [phone]    Continue all meds under the continuation of care with the referring provider and clinical pharmacy team.    Verbal consent to manage patient's drug therapy was obtained from the patient. They were informed they may decline to participate or withdraw from participation in pharmacy services at any time.

## 2024-03-06 ENCOUNTER — OFFICE VISIT (OUTPATIENT)
Dept: PRIMARY CARE | Facility: CLINIC | Age: 64
End: 2024-03-06
Payer: MEDICAID

## 2024-03-06 VITALS
HEIGHT: 72 IN | SYSTOLIC BLOOD PRESSURE: 93 MMHG | WEIGHT: 192 LBS | HEART RATE: 107 BPM | RESPIRATION RATE: 18 BRPM | TEMPERATURE: 97.2 F | DIASTOLIC BLOOD PRESSURE: 65 MMHG | BODY MASS INDEX: 26.01 KG/M2 | OXYGEN SATURATION: 98 %

## 2024-03-06 DIAGNOSIS — Z11.4 SCREENING FOR HUMAN IMMUNODEFICIENCY VIRUS WITHOUT PRESENCE OF RISK FACTORS: ICD-10-CM

## 2024-03-06 DIAGNOSIS — G62.9 NEUROPATHY: ICD-10-CM

## 2024-03-06 DIAGNOSIS — Z11.59 ENCOUNTER FOR HEPATITIS C SCREENING TEST FOR LOW RISK PATIENT: ICD-10-CM

## 2024-03-06 DIAGNOSIS — E11.42 TYPE 2 DIABETES MELLITUS WITH DIABETIC POLYNEUROPATHY, WITH LONG-TERM CURRENT USE OF INSULIN (MULTI): Primary | ICD-10-CM

## 2024-03-06 DIAGNOSIS — K59.04 CHRONIC IDIOPATHIC CONSTIPATION: ICD-10-CM

## 2024-03-06 DIAGNOSIS — E78.2 MIXED HYPERLIPIDEMIA: ICD-10-CM

## 2024-03-06 DIAGNOSIS — I48.91 ATRIAL FIBRILLATION, UNSPECIFIED TYPE (MULTI): ICD-10-CM

## 2024-03-06 DIAGNOSIS — Z79.4 TYPE 2 DIABETES MELLITUS WITH DIABETIC POLYNEUROPATHY, WITH LONG-TERM CURRENT USE OF INSULIN (MULTI): Primary | ICD-10-CM

## 2024-03-06 DIAGNOSIS — I10 PRIMARY HYPERTENSION: ICD-10-CM

## 2024-03-06 PROBLEM — J45.20 MILD INTERMITTENT ASTHMA WITHOUT COMPLICATION (HHS-HCC): Status: ACTIVE | Noted: 2019-12-13

## 2024-03-06 PROCEDURE — 99214 OFFICE O/P EST MOD 30 MIN: CPT | Performed by: FAMILY MEDICINE

## 2024-03-06 PROCEDURE — 3074F SYST BP LT 130 MM HG: CPT | Performed by: FAMILY MEDICINE

## 2024-03-06 PROCEDURE — 4010F ACE/ARB THERAPY RXD/TAKEN: CPT | Performed by: FAMILY MEDICINE

## 2024-03-06 PROCEDURE — 3078F DIAST BP <80 MM HG: CPT | Performed by: FAMILY MEDICINE

## 2024-03-06 PROCEDURE — 4004F PT TOBACCO SCREEN RCVD TLK: CPT | Performed by: FAMILY MEDICINE

## 2024-03-06 RX ORDER — POLYETHYLENE GLYCOL 3350 17 G/17G
17 POWDER, FOR SOLUTION ORAL DAILY
Qty: 510 G | Refills: 11 | Status: SHIPPED | OUTPATIENT
Start: 2024-03-06 | End: 2025-03-06

## 2024-03-06 ASSESSMENT — ENCOUNTER SYMPTOMS
POLYDIPSIA: 0
WOUND: 0
CONFUSION: 0
ARTHRALGIAS: 0
NECK PAIN: 0
WHEEZING: 0
ABDOMINAL PAIN: 0
DYSURIA: 0
ACTIVITY CHANGE: 0
FEVER: 0
APPETITE CHANGE: 0
BACK PAIN: 0
TREMORS: 0
BRUISES/BLEEDS EASILY: 0
NAUSEA: 0
VOICE CHANGE: 0
DIZZINESS: 0
EYE DISCHARGE: 0
HALLUCINATIONS: 0
SPEECH DIFFICULTY: 0
EYE ITCHING: 0
NUMBNESS: 0
CHILLS: 0
HEMATURIA: 0
CHOKING: 0
FACIAL ASYMMETRY: 0
COUGH: 0
ABDOMINAL DISTENTION: 0
TROUBLE SWALLOWING: 0
SINUS PRESSURE: 0
PALPITATIONS: 0
FATIGUE: 0
CONSTIPATION: 1
UNEXPECTED WEIGHT CHANGE: 0
VOMITING: 0
DYSPHORIC MOOD: 0
SORE THROAT: 0
FREQUENCY: 0
JOINT SWELLING: 0
CHEST TIGHTNESS: 0
SEIZURES: 0
NECK STIFFNESS: 0
SHORTNESS OF BREATH: 0
MYALGIAS: 0
DIAPHORESIS: 0
EYE REDNESS: 0
SLEEP DISTURBANCE: 0
HEADACHES: 0
PHOTOPHOBIA: 0
RHINORRHEA: 0
NERVOUS/ANXIOUS: 0
HYPERTENSION: 1
EYE PAIN: 0
LIGHT-HEADEDNESS: 0
AGITATION: 0
WEAKNESS: 0
BLOOD IN STOOL: 0
ADENOPATHY: 0
DIARRHEA: 0
FLANK PAIN: 0

## 2024-03-06 NOTE — PROGRESS NOTES
Subjective   Patient ID: Gunner Dodge is a 63 y.o. male who presents for 3 MONTH FOLLOW UP (And review labs done at [facility] ).    HPI     Review of Systems    Objective   BP 93/65 (BP Location: Right arm, Patient Position: Sitting, BP Cuff Size: Large adult)   Pulse 107   Temp 36.2 °C (97.2 °F) (Temporal)   Resp 18   Ht 1.829 m (6')   Wt 87.1 kg (192 lb)   SpO2 98%   BMI 26.04 kg/m²     Physical Exam    Assessment/Plan   Diagnoses and all orders for this visit:  Type 2 diabetes mellitus with diabetic polyneuropathy, with long-term current use of insulin (CMS/HCC)  -     Follow Up In [facility] - PCP - Established  -     Albumin , Urine Random; Future  -     CBC and Auto Differential; Future  -     Comprehensive Metabolic Panel; Future  -     Hemoglobin A1C; Future  -     Lipid Panel; Future  -     Magnesium; Future  -     TSH with reflex to Free T4 if abnormal; Future  -     Follow Up In [facility] - PCP - Established; Future  Encounter for hepatitis C screening test for low risk patient  -     Hepatitis C antibody; Future  -     Follow Up In [facility] - PCP - Established; Future  Screening for human immunodeficiency virus without presence of risk factors  -     HIV 1/2 Antigen/Antibody Screen with Reflex to Confirmation; Future  -     Follow Up In [facility] - PCP - Established; Future  Chronic idiopathic constipation  -     polyethylene glycol (Glycolax, Miralax) 17 gram/dose powder; Take 17 g by mouth once daily.  -     Follow Up In [facility] - PCP - Established; Future  Primary hypertension  -     Albumin , Urine Random; Future  -     CBC and Auto Differential; Future  -     Comprehensive Metabolic Panel; Future  -     Lipid Panel; Future  -     Magnesium; Future  -     TSH with reflex to Free T4 if abnormal; Future  -     Follow Up In [facility] - PCP - Established; Future  Mixed hyperlipidemia  -     Comprehensive Metabolic Panel;  Future  -     Lipid Panel; Future  -     TSH with reflex to Free T4 if abnormal; Future  -     Follow Up In [facility] - PCP - Established; Future  Atrial fibrillation, unspecified type (CMS/HCC)  Neuropathy  -     Disability Placard

## 2024-03-06 NOTE — PROGRESS NOTES
Subjective   Patient ID: Gunner Dodge is a 63 y.o. male who presents for 3 MONTH FOLLOW UP (And review labs done at [facility] ).    Patient is here to review lab work. Pt reports chronic constipation not relieved with increased water consumption.     Diabetes  He presents for his follow-up diabetic visit. He has type 2 diabetes mellitus. His disease course has been stable. Pertinent negatives for hypoglycemia include no confusion, dizziness, headaches, nervousness/anxiousness, seizures, speech difficulty or tremors. Pertinent negatives for diabetes include no chest pain, no fatigue, no polydipsia, no polyuria and no weakness. There are no hypoglycemic complications. Symptoms are stable. There are no diabetic complications. Risk factors for coronary artery disease include diabetes mellitus, dyslipidemia, hypertension and male sex. Current diabetic treatment includes insulin injections. He is compliant with treatment all of the time. His weight is stable. He is following a generally healthy diet. Meal planning includes avoidance of concentrated sweets. He has not had a previous visit with a dietitian. He participates in exercise intermittently. His home blood glucose trend is decreasing steadily. An ACE inhibitor/angiotensin II receptor blocker is being taken. Eye exam is not current.   Hyperlipidemia  This is a chronic problem. The current episode started more than 1 year ago. The problem is controlled. Recent lipid tests were reviewed and are low. He has no history of chronic renal disease, diabetes, hypothyroidism, liver disease, obesity or nephrotic syndrome. Pertinent negatives include no chest pain, myalgias or shortness of breath. Current antihyperlipidemic treatment includes statins. The current treatment provides significant improvement of lipids. There are no compliance problems.  Risk factors for coronary artery disease include diabetes mellitus, dyslipidemia, hypertension and male sex.   Hypertension  This  is a chronic problem. The current episode started more than 1 year ago. The problem is unchanged. The problem is controlled. Pertinent negatives include no chest pain, headaches, neck pain, palpitations or shortness of breath. There are no associated agents to hypertension. Past treatments include ACE inhibitors. The current treatment provides significant improvement. There are no compliance problems.  There is no history of chronic renal disease, a hypertension causing med or a thyroid problem.        Review of Systems   Constitutional:  Negative for activity change, appetite change, chills, diaphoresis, fatigue, fever and unexpected weight change.   HENT:  Negative for congestion, ear pain, hearing loss, nosebleeds, postnasal drip, rhinorrhea, sinus pressure, sneezing, sore throat, tinnitus, trouble swallowing and voice change.    Eyes:  Negative for photophobia, pain, discharge, redness, itching and visual disturbance.   Respiratory:  Negative for cough, choking, chest tightness, shortness of breath and wheezing.    Cardiovascular:  Negative for chest pain, palpitations and leg swelling.   Gastrointestinal:  Positive for constipation. Negative for abdominal distention, abdominal pain, blood in stool, diarrhea, nausea and vomiting.   Endocrine: Negative for cold intolerance, heat intolerance, polydipsia and polyuria.   Genitourinary:  Negative for dysuria, flank pain, frequency, hematuria and urgency.   Musculoskeletal:  Negative for arthralgias, back pain, joint swelling, myalgias, neck pain and neck stiffness.   Skin:  Negative for rash and wound.   Allergic/Immunologic: Negative for immunocompromised state.   Neurological:  Negative for dizziness, tremors, seizures, syncope, facial asymmetry, speech difficulty, weakness, light-headedness, numbness and headaches.   Hematological:  Negative for adenopathy. Does not bruise/bleed easily.   Psychiatric/Behavioral:  Negative for agitation, behavioral problems,  confusion, dysphoric mood, hallucinations, self-injury, sleep disturbance and suicidal ideas. The patient is not nervous/anxious.        Objective   BP 93/65 (BP Location: Right arm, Patient Position: Sitting, BP Cuff Size: Large adult)   Pulse 107   Temp 36.2 °C (97.2 °F) (Temporal)   Resp 18   Ht 1.829 m (6')   Wt 87.1 kg (192 lb)   SpO2 98%   BMI 26.04 kg/m²     Physical Exam  Constitutional:       General: He is not in acute distress.     Appearance: He is not ill-appearing or diaphoretic.   HENT:      Head: Normocephalic and atraumatic.      Right Ear: External ear normal.      Left Ear: External ear normal.      Nose: Nose normal. No rhinorrhea.   Eyes:      General: Lids are normal. No scleral icterus.        Right eye: No discharge.         Left eye: No discharge.      Conjunctiva/sclera: Conjunctivae normal.   Cardiovascular:      Rate and Rhythm: Normal rate and regular rhythm.      Pulses: Normal pulses.      Heart sounds: Normal heart sounds. No murmur heard.  Pulmonary:      Effort: Pulmonary effort is normal. No respiratory distress.      Breath sounds: Normal breath sounds. No decreased breath sounds, wheezing, rhonchi or rales.   Abdominal:      General: Bowel sounds are normal. There is no distension.      Palpations: Abdomen is soft. There is no mass.      Tenderness: There is no abdominal tenderness. There is no guarding or rebound.   Musculoskeletal:         General: No swelling, tenderness or deformity.      Cervical back: No rigidity or tenderness.      Right lower leg: No edema.      Left lower leg: No edema.   Lymphadenopathy:      Cervical: No cervical adenopathy.      Upper Body:      Right upper body: No supraclavicular adenopathy.      Left upper body: No supraclavicular adenopathy.   Skin:     General: Skin is warm and dry.      Coloration: Skin is not jaundiced or pale.      Findings: No erythema, lesion or rash.   Neurological:      General: No focal deficit present.      Mental  Status: He is alert and oriented to person, place, and time.      Sensory: No sensory deficit.      Motor: No weakness or tremor.      Coordination: Coordination normal.      Gait: Gait abnormal.   Psychiatric:         Mood and Affect: Mood normal. Affect is not inappropriate.         Behavior: Behavior normal.         Assessment/Plan   Diagnoses and all orders for this visit:  Type 2 diabetes mellitus with diabetic polyneuropathy, with long-term current use of insulin (CMS/HCC)  -     Follow Up In [facility] - PCP - Established  -     Albumin , Urine Random; Future  -     CBC and Auto Differential; Future  -     Comprehensive Metabolic Panel; Future  -     Hemoglobin A1C; Future  -     Lipid Panel; Future  -     Magnesium; Future  -     TSH with reflex to Free T4 if abnormal; Future  -     Follow Up In [facility] - PCP - Established; Future  Encounter for hepatitis C screening test for low risk patient  -     Hepatitis C antibody; Future  -     Follow Up In [facility] - PCP - Established; Future  Screening for human immunodeficiency virus without presence of risk factors  -     HIV 1/2 Antigen/Antibody Screen with Reflex to Confirmation; Future  -     Follow Up In [facility] - PCP - Established; Future  Chronic idiopathic constipation  -     polyethylene glycol (Glycolax, Miralax) 17 gram/dose powder; Take 17 g by mouth once daily.  -     Follow Up In [facility] - PCP - Established; Future  Primary hypertension  -     Albumin , Urine Random; Future  -     CBC and Auto Differential; Future  -     Comprehensive Metabolic Panel; Future  -     Lipid Panel; Future  -     Magnesium; Future  -     TSH with reflex to Free T4 if abnormal; Future  -     Follow Up In [facility] - PCP - Established; Future  Mixed hyperlipidemia  -     Comprehensive Metabolic Panel; Future  -     Lipid Panel; Future  -     TSH with reflex to Free T4 if abnormal; Future  -      Follow Up In [facility] - PCP - Established; Future  Atrial fibrillation, unspecified type (CMS/HCC)  Neuropathy  -     Disability Jajaard  Patient was seen and evaluated with student. I was present for critical portion of history and physical exam. I reviewed note with student and agree with findings as written  TJHALLE DO

## 2024-03-25 ENCOUNTER — TELEMEDICINE (OUTPATIENT)
Dept: PHARMACY | Facility: HOSPITAL | Age: 64
End: 2024-03-25
Payer: MEDICAID

## 2024-03-25 DIAGNOSIS — E11.42 TYPE 2 DIABETES MELLITUS WITH DIABETIC POLYNEUROPATHY, WITH LONG-TERM CURRENT USE OF INSULIN (MULTI): ICD-10-CM

## 2024-03-25 DIAGNOSIS — Z79.4 TYPE 2 DIABETES MELLITUS WITH DIABETIC POLYNEUROPATHY, WITH LONG-TERM CURRENT USE OF INSULIN (MULTI): ICD-10-CM

## 2024-03-25 NOTE — PROGRESS NOTES
Patient ID: Gunner Dodge is a 63 y.o. male who presents for Diabetes.    Referring Provider: Mau Avelar DO   Pt is here for follow up appointment. Last visit with PCP: 23    Verbal consent to manage patient's drug therapy was obtained from patient. They were informed they may decline to participate or withdraw from participation in pharmacy services at any time. Patient is agreeable to detailed voice messages if unable to be reached.     Does patient see endocrine? No, pt denies  Who are the patient's medications managed by? PCP      INTERVAL HISTORY:  Labs repeated - A1c improved  Got social security worked out - good place financially now      Subjective   Treatment Adherence:   Patient did take medications today.   Number of missed doses in last 7 days: none.  Why? N/A     Preferred pharmacy:  in [place]  Can patient afford prescribed medications: Yes, no issues reported     Current exercise: no formal exercise; very painful neuropathy so hard to walk, stopped driving about a year ago    Current diet: tries to avoid sugary drinks; does have SNAP card from medicare for shopping  Breakfast: bagel with butter with tomato juice or milk (at about 2pm)  Lunch: skips  Dinner: chicken breast, mashed potatoes; typically meat + potatoes   Snacks: sugar free jello, grapes  Drinks: water and coffee (black), occasionally diet pepsi      HPI  DIABETES MELLITUS TYPE 2:  Diagnosed with diabetes:  10-12 years per pt. Known diabetic complications: polyneuropathy  Last optometry exam: early   Most recent visit in Podiatry: none-- patient denies sores or cuts on feet today      Current diabetic medications include:  Metformin 1G BID  Lantus 14 units at bedtime      Patient tests SMBGS once daily  Home blood glucose records (mg/dL)  FBs-140s  FBs  FBG: 160-170s  FBG: low 200s      Any episodes of hypoglycemia? No, pt denies .  Did patient treat episode of hypoglycemia appropriately? N/A  Does pt  "have proteinuria? No If appropriate, is patient on ACEi/ARB? N/A      Secondary Prevention  Statin? Yes - LDL 81 on 12.4.23   ACE-I/ARB? Yes  Aspirin? Yes    Pertinent PMH Review:  PMH of Pancreatitis: No  PMH of Retinopathy: No  PMH of Urinary Tract Infections: No  PMH of MTC: No  CAD, Afib with pacemaker        Review of Systems    Objective     There were no vitals taken for this visit.     Labs - see media tab for results  No results found for: \"BILITOT\", \"CALCIUM\", \"CO2\", \"CL\", \"CREATININE\", \"GLUCOSE\", \"ALKPHOS\", \"K\", \"PROT\", \"NA\", \"AST\", \"ALT\", \"BUN\", \"ANIONGAP\", \"MG\", \"PHOS\", \"GGT\", \"LDH\", \"ALBUMIN\", \"AMYLASE\", \"LIPASE\", \"GFRF\", \"GFRMALE\"  No results found for: \"TRIG\", \"CHOL\", \"LDLCALC\", \"HDL\"    12.4.2023:  A1c = 11.1%  LDL = 81mg/dL  eGFR = 105    2.28.2024  A1c = 8.8%  Creatinine = 0.67mg/dL  eGFR = 106  LDL = 33    Current Outpatient Medications   Medication Instructions    aspirin 81 mg, oral, Daily    cholecalciferol (VITAMIN D3) 50 mcg, oral, Daily    Lantus Solostar U-100 Insulin 14 Units, subcutaneous, Nightly, Take as directed per insulin instructions.    lisinopril 1.25 mg, oral, Daily    metFORMIN XR (GLUCOPHAGE-XR) 2,000 mg, oral, Daily with breakfast, Do not crush, chew, or split.    mirtazapine (REMERON) 7.5 mg, oral, Nightly    pen needle, diabetic 31 gauge x 5/16\" needle Use to inject 1 times daily as directed.    polyethylene glycol (GLYCOLAX, MIRALAX) 17 g, oral, Daily    rosuvastatin (CRESTOR) 5 mg, oral, Daily        Assessment/Plan   Problem List Items Addressed This Visit             ICD-10-CM    Type 2 diabetes mellitus with diabetic polyneuropathy, with long-term current use of insulin (CMS/[facility]) E11.42, Z79.4     Patient identified self-management goal:  get A1c to goal  Patient's goal A1c is < 7%.  Is pt at goal? No, 11.1% on 12.4.23  Patient's SMBGs are above goal but improved  Rationale for plan: Sugars are continuing to improved. Discussed starting Farxiga for additional BG " control and cardiorenal protection; patient declined at this time - would like to wait until next PCP appt.     Medication Changes:  CONTINUE:  Lantus to 14 units QHS  Metformin 1G BID      Monitoring and Education:  Diabetes Education  Rule of 15: eating ~15 g of carbs when BG less than 80 (half cup juice, 3-4 glucose tabs).  Recognize symptoms of high and low blood sugar.   Eat a realistic healthy diet consisting of fruits, vegetables, fiber, protein food choices on a regular basis and be aware of portion/serving sizes. Reduce carbohydrate consumption and always consume with protein and fat. Avoid foods high in saturated/trans fat, high salt content, and sweets and beverages with added sugars.  Limit alcohol consumption; alcohol may affect your blood sugar and cause hypoglycemia.   Stay active and incorporate ~30 mins of exercise into your daily routine to manage your weight and increase the body's acceptance of insulin.    PATIENT GOALS   Fasting B - 130 mg/dL 2-HR Postprandial BG:   Less than 180 mg/dL A1c:   Less than 7.0 %           Labs ordered:  none - see media tab for results     Referrals:  none     Follow-up: Gunner Dodge via telephone with Goldie     Time spent with pt: Total length of time 20 (minutes) of the encounter and more than 50% was spent counseling the patient.    Goldie Gonzalez, PharmD, KASIA  Clinical Pharmacist  [phone]  [email or web address]     Continue all meds under the continuation of care with the referring provider and clinical pharmacy team.    Verbal consent to manage patient's drug therapy was obtained from the patient. They were informed they may decline to participate or withdraw from participation in pharmacy services at any time.

## 2024-03-25 NOTE — ASSESSMENT & PLAN NOTE
Patient identified self-management goal:  get A1c to goal  Patient's goal A1c is < 7%.  Is pt at goal? No, 11.1% on 23  Patient's SMBGs are above goal but improved  Rationale for plan: Sugars are continuing to improved. Discussed starting Farxiga for additional BG control and cardiorenal protection; patient declined at this time - would like to wait until next PCP appt.     Medication Changes:  CONTINUE:  Lantus to 14 units QHS  Metformin 1G BID      Monitoring and Education:  Diabetes Education  Rule of 15: eating ~15 g of carbs when BG less than 80 (half cup juice, 3-4 glucose tabs).  Recognize symptoms of high and low blood sugar.   Eat a realistic healthy diet consisting of fruits, vegetables, fiber, protein food choices on a regular basis and be aware of portion/serving sizes. Reduce carbohydrate consumption and always consume with protein and fat. Avoid foods high in saturated/trans fat, high salt content, and sweets and beverages with added sugars.  Limit alcohol consumption; alcohol may affect your blood sugar and cause hypoglycemia.   Stay active and incorporate ~30 mins of exercise into your daily routine to manage your weight and increase the body's acceptance of insulin.    PATIENT GOALS   Fasting B - 130 mg/dL 2-HR Postprandial BG:   Less than 180 mg/dL A1c:   Less than 7.0 %

## 2024-04-22 ENCOUNTER — TELEMEDICINE (OUTPATIENT)
Dept: PHARMACY | Facility: HOSPITAL | Age: 64
End: 2024-04-22
Payer: MEDICAID

## 2024-04-22 DIAGNOSIS — F32.1 CURRENT MODERATE EPISODE OF MAJOR DEPRESSIVE DISORDER WITHOUT PRIOR EPISODE (MULTI): ICD-10-CM

## 2024-04-22 DIAGNOSIS — E11.42 TYPE 2 DIABETES MELLITUS WITH DIABETIC POLYNEUROPATHY, WITH LONG-TERM CURRENT USE OF INSULIN (MULTI): ICD-10-CM

## 2024-04-22 DIAGNOSIS — Z79.4 TYPE 2 DIABETES MELLITUS WITH DIABETIC POLYNEUROPATHY, WITH LONG-TERM CURRENT USE OF INSULIN (MULTI): ICD-10-CM

## 2024-04-22 DIAGNOSIS — F41.9 ANXIETY: ICD-10-CM

## 2024-04-22 RX ORDER — MIRTAZAPINE 7.5 MG/1
7.5 TABLET, FILM COATED ORAL NIGHTLY
Qty: 30 TABLET | Refills: 3 | Status: SHIPPED | OUTPATIENT
Start: 2024-04-22

## 2024-04-22 NOTE — ASSESSMENT & PLAN NOTE
Patient's goal A1c is < 7%.  Is pt at goal? No, 8.8% on 24  Patient's SMBGs are above goal but improved  Rationale for plan: Previously discussed starting Farxiga for additional BG control and cardiorenal protection - patient would like to wait until next PCP appt.     Instructed to have sugars at next appt and will see about decreasing insulin dose.    Medication Changes:  CONTINUE:  Lantus to 14 units QHS  Metformin 1G BID      Monitoring and Education:  Diabetes Education  Rule of 15: eating ~15 g of carbs when BG less than 80 (half cup juice, 3-4 glucose tabs).  Recognize symptoms of high and low blood sugar.   Eat a realistic healthy diet consisting of fruits, vegetables, fiber, protein food choices on a regular basis and be aware of portion/serving sizes. Reduce carbohydrate consumption and always consume with protein and fat. Avoid foods high in saturated/trans fat, high salt content, and sweets and beverages with added sugars.  Limit alcohol consumption; alcohol may affect your blood sugar and cause hypoglycemia.   Stay active and incorporate ~30 mins of exercise into your daily routine to manage your weight and increase the body's acceptance of insulin.    PATIENT GOALS   Fasting B - 130 mg/dL 2-HR Postprandial BG:   Less than 180 mg/dL A1c:   Less than 7.0 %

## 2024-04-22 NOTE — PROGRESS NOTES
Patient ID: Gunner Dodge is a 63 y.o. male who presents for Diabetes. Pt is here for follow up appointment.     Referring Provider: Mau Avelar DO   Last visit with PCP: 23  Next visit: 24    Verbal consent to manage patient's drug therapy was obtained from patient. They were informed they may decline to participate or withdraw from participation in pharmacy services at any time. Patient is agreeable to detailed voice messages if unable to be reached.       INTERVAL HISTORY:  Labs repeated - A1c improved  Got social security worked out - good place financially now      Subjective   Treatment Adherence:   Patient did take medications today.   Number of missed doses in last 7 days: none.  Why? N/A     Preferred pharmacy:  in [place]  Can patient afford prescribed medications: Yes, no issues reported     Current exercise: no formal exercise; very painful neuropathy so hard to walk, stopped driving about a year ago    Current diet: tries to avoid sugary drinks; does have SNAP card from medicare for shopping  Breakfast: bagel with butter with tomato juice or milk (at about 2pm)  Lunch: skips  Dinner: chicken breast, mashed potatoes; typically meat + potatoes   Snacks: sugar free jello, grapes  Drinks: water and coffee (black), occasionally diet pepsi      HPI  DIABETES MELLITUS TYPE 2:  Diagnosed with diabetes:  10-12 years per pt. Known diabetic complications: polyneuropathy  Last optometry exam: early   Most recent visit in Podiatry: none-- patient denies sores or cuts on feet today      Current diabetic medications include:  Metformin 1G BID  Lantus 14 units at bedtime      Patient tests SMBGS once daily  Home blood glucose records (mg/dL)  FBG: has not checked recently  FBs-140s  FBG: low 200s      Any episodes of hypoglycemia? No, pt denies .  Did patient treat episode of hypoglycemia appropriately? N/A  Does pt have proteinuria? No If appropriate, is patient on ACEi/ARB?  "N/A      Secondary Prevention  Statin? Yes - LDL 81 on 12.4.23   ACE-I/ARB? Yes  Aspirin? Yes    Pertinent PMH Review:  PMH of Pancreatitis: No  PMH of Retinopathy: No  PMH of Urinary Tract Infections: No  PMH of MTC: No  CAD, Afib with pacemaker        Review of Systems    Objective     There were no vitals taken for this visit.     Labs - see media tab for results  No results found for: \"BILITOT\", \"CALCIUM\", \"CO2\", \"CL\", \"CREATININE\", \"GLUCOSE\", \"ALKPHOS\", \"K\", \"PROT\", \"NA\", \"AST\", \"ALT\", \"BUN\", \"ANIONGAP\", \"MG\", \"PHOS\", \"GGT\", \"LDH\", \"ALBUMIN\", \"AMYLASE\", \"LIPASE\", \"GFRF\", \"GFRMALE\"  No results found for: \"TRIG\", \"CHOL\", \"LDLCALC\", \"HDL\"    12.4.2023:  A1c = 11.1%  LDL = 81mg/dL  eGFR = 105    2.28.2024  A1c = 8.8%  Creatinine = 0.67mg/dL  eGFR = 106  LDL = 33    Current Outpatient Medications   Medication Instructions    aspirin 81 mg, oral, Daily    cholecalciferol (VITAMIN D3) 50 mcg, oral, Daily    Lantus Solostar U-100 Insulin 14 Units, subcutaneous, Nightly, Take as directed per insulin instructions.    lisinopril 1.25 mg, oral, Daily    metFORMIN XR (GLUCOPHAGE-XR) 2,000 mg, oral, Daily with breakfast, Do not crush, chew, or split.    mirtazapine (REMERON) 7.5 mg, oral, Nightly    pen needle, diabetic 31 gauge x 5/16\" needle Use to inject 1 times daily as directed.    polyethylene glycol (GLYCOLAX, MIRALAX) 17 g, oral, Daily    rosuvastatin (CRESTOR) 5 mg, oral, Daily        Assessment/Plan   Problem List Items Addressed This Visit             ICD-10-CM    Type 2 diabetes mellitus with diabetic polyneuropathy, with long-term current use of insulin (Multi) E11.42, Z79.4     Patient's goal A1c is < 7%.  Is pt at goal? No, 8.8% on 2.28.24  Patient's SMBGs are above goal but improved  Rationale for plan: Previously discussed starting Farxiga for additional BG control and cardiorenal protection - patient would like to wait until next PCP appt.     Instructed to have sugars at next appt and will see about " decreasing insulin dose.    Medication Changes:  CONTINUE:  Lantus to 14 units QHS  Metformin 1G BID      Monitoring and Education:  Diabetes Education  Rule of 15: eating ~15 g of carbs when BG less than 80 (half cup juice, 3-4 glucose tabs).  Recognize symptoms of high and low blood sugar.   Eat a realistic healthy diet consisting of fruits, vegetables, fiber, protein food choices on a regular basis and be aware of portion/serving sizes. Reduce carbohydrate consumption and always consume with protein and fat. Avoid foods high in saturated/trans fat, high salt content, and sweets and beverages with added sugars.  Limit alcohol consumption; alcohol may affect your blood sugar and cause hypoglycemia.   Stay active and incorporate ~30 mins of exercise into your daily routine to manage your weight and increase the body's acceptance of insulin.    PATIENT GOALS   Fasting B - 130 mg/dL 2-HR Postprandial BG:   Less than 180 mg/dL A1c:   Less than 7.0 %               Labs ordered:  none - see media tab for results     Referrals:  none     Follow-up: May 20th at 1400 via telephone with Goldie     Time spent with pt: Total length of time 10 (minutes) of the encounter and more than 50% was spent counseling the patient.    Goldie Gonzalez, PharmD, KASIA  Clinical Pharmacist  [phone]  [email or web address]     Continue all meds under the continuation of care with the referring provider and clinical pharmacy team.    Verbal consent to manage patient's drug therapy was obtained from the patient. They were informed they may decline to participate or withdraw from participation in pharmacy services at any time.

## 2024-05-20 ENCOUNTER — TELEMEDICINE (OUTPATIENT)
Dept: PHARMACY | Facility: HOSPITAL | Age: 64
End: 2024-05-20
Payer: MEDICAID

## 2024-05-20 DIAGNOSIS — Z79.4 TYPE 2 DIABETES MELLITUS WITH DIABETIC POLYNEUROPATHY, WITH LONG-TERM CURRENT USE OF INSULIN (MULTI): ICD-10-CM

## 2024-05-20 DIAGNOSIS — E11.42 TYPE 2 DIABETES MELLITUS WITH DIABETIC POLYNEUROPATHY, WITH LONG-TERM CURRENT USE OF INSULIN (MULTI): ICD-10-CM

## 2024-05-20 NOTE — PROGRESS NOTES
Patient ID: Gunner Dodge is a 63 y.o. male who presents for Diabetes. Pt is here for follow up appointment.     Referring Provider: Mau Avelar DO   Last visit with PCP: 23  Next visit: 24    Verbal consent to manage patient's drug therapy was obtained from patient. They were informed they may decline to participate or withdraw from participation in pharmacy services at any time. Patient is agreeable to detailed voice messages if unable to be reached.       INTERVAL HISTORY:  Labs repeated - A1c improved  Got social security worked out - good place financially now      Subjective   Treatment Adherence:   Patient did take medications today.   Number of missed doses in last 7 days: none.  Why? N/A     Preferred pharmacy:  in [place]  Can patient afford prescribed medications: Yes, no issues reported     Current exercise: no formal exercise; very painful neuropathy so hard to walk, stopped driving about a year ago    Current diet: tries to avoid sugary drinks; does have SNAP card from medicare for shopping  Breakfast: bagel with butter with tomato juice or milk (at about 2pm)  Lunch: skips  Dinner: chicken breast, mashed potatoes; typically meat + potatoes   Snacks: sugar free jello, grapes  Drinks: water and coffee (black), occasionally diet pepsi      HPI  DIABETES MELLITUS TYPE 2:  Diagnosed with diabetes:  10-12 years per pt. Known diabetic complications: polyneuropathy  Last optometry exam: early   Most recent visit in Podiatry: none-- patient denies sores or cuts on feet today      Current diabetic medications include:  Metformin 1G BID  Lantus 14 units at bedtime    Home blood glucose records (mg/dL):  FB this morning (had chips last night), 120-140s usually  FBs-140s  FBG: low 200s      Any episodes of hypoglycemia? No, pt denies .  Did patient treat episode of hypoglycemia appropriately? N/A  Does pt have proteinuria? No If appropriate, is patient on ACEi/ARB?  "N/A      Secondary Prevention  Statin? Yes - LDL 81 on 12.4.23   ACE-I/ARB? Yes  Aspirin? Yes    Pertinent PMH Review:  PMH of Pancreatitis: No  PMH of Retinopathy: No  PMH of Urinary Tract Infections: No  PMH of MTC: No  CAD, Afib with pacemaker        Review of Systems    Objective     There were no vitals taken for this visit.     Labs - see media tab for results  No results found for: \"BILITOT\", \"CALCIUM\", \"CO2\", \"CL\", \"CREATININE\", \"GLUCOSE\", \"ALKPHOS\", \"K\", \"PROT\", \"NA\", \"AST\", \"ALT\", \"BUN\", \"ANIONGAP\", \"MG\", \"PHOS\", \"GGT\", \"LDH\", \"ALBUMIN\", \"AMYLASE\", \"LIPASE\", \"GFRF\", \"GFRMALE\"  No results found for: \"TRIG\", \"CHOL\", \"LDLCALC\", \"HDL\"    12.4.2023:  A1c = 11.1%  LDL = 81mg/dL  eGFR = 105    2.28.2024  A1c = 8.8%  Creatinine = 0.67mg/dL  eGFR = 106  LDL = 33    Current Outpatient Medications   Medication Instructions    aspirin 81 mg, oral, Daily    cholecalciferol (VITAMIN D3) 50 mcg, oral, Daily    Lantus Solostar U-100 Insulin 14 Units, subcutaneous, Nightly, Take as directed per insulin instructions.    lisinopril 1.25 mg, oral, Daily    metFORMIN XR (GLUCOPHAGE-XR) 2,000 mg, oral, Daily with breakfast, Do not crush, chew, or split.    mirtazapine (REMERON) 7.5 mg, oral, Nightly    pen needle, diabetic 31 gauge x 5/16\" needle Use to inject 1 times daily as directed.    polyethylene glycol (GLYCOLAX, MIRALAX) 17 g, oral, Daily    rosuvastatin (CRESTOR) 5 mg, oral, Daily        Assessment/Plan   Problem List Items Addressed This Visit             ICD-10-CM    Type 2 diabetes mellitus with diabetic polyneuropathy, with long-term current use of insulin (Multi) E11.42, Z79.4     Patient's goal A1c is < 7%.  Is pt at goal? No, 8.8% on 2.28.24  Patient's SMBGs are above goal but improved  Rationale for plan: Will increase Lantus slightly to improve FBG. Previously discussed starting Farxiga for additional BG control and cardiorenal protection - patient would like to wait until next PCP appt.     Medication " Changes:  INCREASE  Lantus to 16 units at bedtime    CONTINUE:  Metformin 1G BID      Monitoring and Education:  Diabetes Education  Rule of 15: eating ~15 g of carbs when BG less than 80 (half cup juice, 3-4 glucose tabs).  Recognize symptoms of high and low blood sugar.   Eat a realistic healthy diet consisting of fruits, vegetables, fiber, protein food choices on a regular basis and be aware of portion/serving sizes. Reduce carbohydrate consumption and always consume with protein and fat. Avoid foods high in saturated/trans fat, high salt content, and sweets and beverages with added sugars.  Limit alcohol consumption; alcohol may affect your blood sugar and cause hypoglycemia.   Stay active and incorporate ~30 mins of exercise into your daily routine to manage your weight and increase the body's acceptance of insulin.    PATIENT GOALS   Fasting B - 130 mg/dL 2-HR Postprandial BG:   Less than 180 mg/dL A1c:   Less than 7.0 %            Relevant Orders    Follow Up In [facility] - Pharmacy         Labs ordered:  none - see media tab for results     Referrals:  none     Follow-up: Katya 10 at 1400 via telephone     Time spent with pt: Total length of time 15 (minutes) of the encounter and more than 50% was spent counseling the patient.    Goldie Gonzalez, PharmD, KASIA  Clinical Pharmacist  [phone]  [email or web address]     Continue all meds under the continuation of care with the referring provider and clinical pharmacy team.    Verbal consent to manage patient's drug therapy was obtained from the patient. They were informed they may decline to participate or withdraw from participation in pharmacy services at any time.

## 2024-05-20 NOTE — ASSESSMENT & PLAN NOTE
Patient's goal A1c is < 7%.  Is pt at goal? No, 8.8% on 24  Patient's SMBGs are above goal but improved  Rationale for plan: Will increase Lantus slightly to improve FBG. Previously discussed starting Farxiga for additional BG control and cardiorenal protection - patient would like to wait until next PCP appt.     Medication Changes:  INCREASE  Lantus to 16 units at bedtime    CONTINUE:  Metformin 1G BID      Monitoring and Education:  Diabetes Education  Rule of 15: eating ~15 g of carbs when BG less than 80 (half cup juice, 3-4 glucose tabs).  Recognize symptoms of high and low blood sugar.   Eat a realistic healthy diet consisting of fruits, vegetables, fiber, protein food choices on a regular basis and be aware of portion/serving sizes. Reduce carbohydrate consumption and always consume with protein and fat. Avoid foods high in saturated/trans fat, high salt content, and sweets and beverages with added sugars.  Limit alcohol consumption; alcohol may affect your blood sugar and cause hypoglycemia.   Stay active and incorporate ~30 mins of exercise into your daily routine to manage your weight and increase the body's acceptance of insulin.    PATIENT GOALS   Fasting B - 130 mg/dL 2-HR Postprandial BG:   Less than 180 mg/dL A1c:   Less than 7.0 %

## 2024-06-05 ENCOUNTER — OFFICE VISIT (OUTPATIENT)
Dept: PRIMARY CARE | Facility: CLINIC | Age: 64
End: 2024-06-05
Payer: MEDICAID

## 2024-06-05 VITALS
SYSTOLIC BLOOD PRESSURE: 99 MMHG | TEMPERATURE: 97.3 F | DIASTOLIC BLOOD PRESSURE: 67 MMHG | BODY MASS INDEX: 27.77 KG/M2 | RESPIRATION RATE: 16 BRPM | WEIGHT: 205 LBS | HEART RATE: 109 BPM | OXYGEN SATURATION: 98 % | HEIGHT: 72 IN

## 2024-06-05 DIAGNOSIS — Z00.00 ROUTINE GENERAL MEDICAL EXAMINATION AT A HEALTH CARE FACILITY: Primary | ICD-10-CM

## 2024-06-05 DIAGNOSIS — I10 PRIMARY HYPERTENSION: ICD-10-CM

## 2024-06-05 DIAGNOSIS — K59.04 CHRONIC IDIOPATHIC CONSTIPATION: ICD-10-CM

## 2024-06-05 DIAGNOSIS — Z79.4 TYPE 2 DIABETES MELLITUS WITH DIABETIC POLYNEUROPATHY, WITH LONG-TERM CURRENT USE OF INSULIN (MULTI): ICD-10-CM

## 2024-06-05 DIAGNOSIS — E78.2 MIXED HYPERLIPIDEMIA: ICD-10-CM

## 2024-06-05 DIAGNOSIS — Z12.5 SPECIAL SCREENING EXAMINATION FOR NEOPLASM OF PROSTATE: ICD-10-CM

## 2024-06-05 DIAGNOSIS — Z23 ENCOUNTER FOR IMMUNIZATION: ICD-10-CM

## 2024-06-05 DIAGNOSIS — E11.42 TYPE 2 DIABETES MELLITUS WITH DIABETIC POLYNEUROPATHY, WITH LONG-TERM CURRENT USE OF INSULIN (MULTI): ICD-10-CM

## 2024-06-05 PROBLEM — J45.20 MILD INTERMITTENT ASTHMA WITHOUT COMPLICATION (HHS-HCC): Status: RESOLVED | Noted: 2019-12-13 | Resolved: 2024-06-05

## 2024-06-05 PROCEDURE — 3078F DIAST BP <80 MM HG: CPT | Performed by: FAMILY MEDICINE

## 2024-06-05 PROCEDURE — 4010F ACE/ARB THERAPY RXD/TAKEN: CPT | Performed by: FAMILY MEDICINE

## 2024-06-05 PROCEDURE — 3074F SYST BP LT 130 MM HG: CPT | Performed by: FAMILY MEDICINE

## 2024-06-05 PROCEDURE — 99396 PREV VISIT EST AGE 40-64: CPT | Performed by: FAMILY MEDICINE

## 2024-06-05 RX ORDER — ROSUVASTATIN CALCIUM 5 MG/1
5 TABLET, COATED ORAL EVERY OTHER DAY
Start: 2024-06-05

## 2024-06-05 ASSESSMENT — ENCOUNTER SYMPTOMS
NUMBNESS: 0
BLOOD IN STOOL: 0
POLYDIPSIA: 0
CHILLS: 0
HYPERTENSION: 1
FACIAL ASYMMETRY: 0
CONSTIPATION: 1
VOICE CHANGE: 0
COUGH: 0
VOMITING: 0
SORE THROAT: 0
NAUSEA: 0
EYE ITCHING: 0
TREMORS: 0
FEVER: 0
APPETITE CHANGE: 0
ARTHRALGIAS: 0
ABDOMINAL PAIN: 0
FLANK PAIN: 0
SLEEP DISTURBANCE: 0
RHINORRHEA: 0
DIAPHORESIS: 0
EYE PAIN: 0
SEIZURES: 0
WOUND: 0
JOINT SWELLING: 0
SHORTNESS OF BREATH: 0
ABDOMINAL DISTENTION: 0
NERVOUS/ANXIOUS: 0
WHEEZING: 0
AGITATION: 0
DYSPHORIC MOOD: 0
NECK PAIN: 0
HALLUCINATIONS: 0
TROUBLE SWALLOWING: 0
SPEECH DIFFICULTY: 0
CONFUSION: 0
BRUISES/BLEEDS EASILY: 0
ADENOPATHY: 0
DIZZINESS: 0
DIARRHEA: 0
DYSURIA: 0
UNEXPECTED WEIGHT CHANGE: 0
WEAKNESS: 0
SINUS PRESSURE: 0
PALPITATIONS: 0
LIGHT-HEADEDNESS: 0
HEADACHES: 0
NECK STIFFNESS: 0
MYALGIAS: 0
HEMATURIA: 0
FATIGUE: 0
EYE REDNESS: 0
BACK PAIN: 0
EYE DISCHARGE: 0
FREQUENCY: 0
CHEST TIGHTNESS: 0
CHOKING: 0
ACTIVITY CHANGE: 0
PHOTOPHOBIA: 0

## 2024-06-05 NOTE — PROGRESS NOTES
Subjective   Patient ID: Gunner Dodge is a 63 y.o. male who presents for Annual Exam (And review labs done a [facility]).    Subjective  Gunner Dodge is a 63 y.o. male and is here for a comprehensive physical exam. The patient reports see below.    Do you take any herbs or supplements that were not prescribed by a doctor? no  Are you taking calcium supplements? no  Are you taking aspirin daily? yes      History:  Date last PSA: 8/1/2023    Diabetes  He presents for his follow-up diabetic visit. He has type 2 diabetes mellitus. His disease course has been stable. Pertinent negatives for hypoglycemia include no confusion, dizziness, headaches, nervousness/anxiousness, seizures, speech difficulty or tremors. Pertinent negatives for diabetes include no chest pain, no fatigue, no polydipsia, no polyuria and no weakness. There are no hypoglycemic complications. Symptoms are stable. There are no diabetic complications. Risk factors for coronary artery disease include diabetes mellitus, dyslipidemia, hypertension and male sex. Current diabetic treatment includes insulin injections. He is compliant with treatment all of the time. His weight is stable. He is following a generally healthy diet. Meal planning includes avoidance of concentrated sweets. He has not had a previous visit with a dietitian. He participates in exercise intermittently. His home blood glucose trend is decreasing steadily. An ACE inhibitor/angiotensin II receptor blocker is being taken. Eye exam is not current.   Hyperlipidemia  This is a chronic problem. The current episode started more than 1 year ago. The problem is controlled. Recent lipid tests were reviewed and are low. He has no history of chronic renal disease, diabetes, hypothyroidism, liver disease, obesity or nephrotic syndrome. Pertinent negatives include no chest pain, myalgias or shortness of breath. Current antihyperlipidemic treatment includes statins. The current treatment provides  significant improvement of lipids. There are no compliance problems.  Risk factors for coronary artery disease include diabetes mellitus, dyslipidemia, hypertension and male sex.   Hypertension  This is a chronic problem. The current episode started more than 1 year ago. The problem is unchanged. The problem is controlled. Pertinent negatives include no chest pain, headaches, neck pain, palpitations or shortness of breath. There are no associated agents to hypertension. Past treatments include ACE inhibitors. The current treatment provides significant improvement. There are no compliance problems.  There is no history of chronic renal disease, a hypertension causing med or a thyroid problem.        Review of Systems   Constitutional:  Negative for activity change, appetite change, chills, diaphoresis, fatigue, fever and unexpected weight change.   HENT:  Negative for congestion, ear pain, hearing loss, nosebleeds, postnasal drip, rhinorrhea, sinus pressure, sneezing, sore throat, tinnitus, trouble swallowing and voice change.    Eyes:  Negative for photophobia, pain, discharge, redness, itching and visual disturbance.   Respiratory:  Negative for cough, choking, chest tightness, shortness of breath and wheezing.    Cardiovascular:  Negative for chest pain, palpitations and leg swelling.   Gastrointestinal:  Positive for constipation. Negative for abdominal distention, abdominal pain, blood in stool, diarrhea, nausea and vomiting.   Endocrine: Negative for cold intolerance, heat intolerance, polydipsia and polyuria.   Genitourinary:  Negative for dysuria, flank pain, frequency, hematuria and urgency.   Musculoskeletal:  Negative for arthralgias, back pain, joint swelling, myalgias, neck pain and neck stiffness.   Skin:  Negative for rash and wound.   Allergic/Immunologic: Negative for immunocompromised state.   Neurological:  Negative for dizziness, tremors, seizures, syncope, facial asymmetry, speech difficulty,  weakness, light-headedness, numbness and headaches.   Hematological:  Negative for adenopathy. Does not bruise/bleed easily.   Psychiatric/Behavioral:  Negative for agitation, behavioral problems, confusion, dysphoric mood, hallucinations, self-injury, sleep disturbance and suicidal ideas. The patient is not nervous/anxious.        Objective   BP 99/67 (BP Location: Left arm, Patient Position: Sitting, BP Cuff Size: Large adult)   Pulse 109   Temp 36.3 °C (97.3 °F) (Temporal)   Resp 16   Ht 1.829 m (6')   Wt 93 kg (205 lb)   SpO2 98%   BMI 27.80 kg/m²     Physical Exam  Constitutional:       General: He is not in acute distress.     Appearance: He is not ill-appearing or diaphoretic.   HENT:      Head: Normocephalic and atraumatic.      Right Ear: External ear normal.      Left Ear: External ear normal.      Nose: Nose normal. No rhinorrhea.   Eyes:      General: Lids are normal. No scleral icterus.        Right eye: No discharge.         Left eye: No discharge.      Conjunctiva/sclera: Conjunctivae normal.   Cardiovascular:      Rate and Rhythm: Normal rate and regular rhythm.      Pulses: Normal pulses.      Heart sounds: Normal heart sounds. No murmur heard.  Pulmonary:      Effort: Pulmonary effort is normal. No respiratory distress.      Breath sounds: Normal breath sounds. No decreased breath sounds, wheezing, rhonchi or rales.   Abdominal:      General: Bowel sounds are normal. There is no distension.      Palpations: Abdomen is soft. There is no mass.      Tenderness: There is no abdominal tenderness. There is no guarding or rebound.   Musculoskeletal:         General: No swelling, tenderness or deformity.      Cervical back: No rigidity or tenderness.      Right lower leg: No edema.      Left lower leg: No edema.   Lymphadenopathy:      Cervical: No cervical adenopathy.      Upper Body:      Right upper body: No supraclavicular adenopathy.      Left upper body: No supraclavicular adenopathy.    Skin:     General: Skin is warm and dry.      Coloration: Skin is not jaundiced or pale.      Findings: No erythema, lesion or rash.   Neurological:      General: No focal deficit present.      Mental Status: He is alert and oriented to person, place, and time.      Sensory: No sensory deficit.      Motor: No weakness or tremor.      Coordination: Coordination normal.      Gait: Gait abnormal.   Psychiatric:         Mood and Affect: Mood normal. Affect is not inappropriate.         Behavior: Behavior normal.         Assessment/Plan   Diagnoses and all orders for this visit:  Routine general medical examination at a health care facility  Type 2 diabetes mellitus with diabetic polyneuropathy, with long-term current use of insulin (Multi)  -     Follow Up In [facility] - PCP - Established  -     Albumin , Urine Random; Future  -     CBC and Auto Differential; Future  -     Comprehensive Metabolic Panel; Future  -     Hemoglobin A1C; Future  -     Lipid Panel; Future  -     Magnesium; Future  -     TSH with reflex to Free T4 if abnormal; Future  -     rosuvastatin (Crestor) 5 mg tablet; Take 1 tablet (5 mg) by mouth every other day.  -     Follow Up In [facility] - PCP - Established; Future  Chronic idiopathic constipation  -     Follow Up In [facility]  PCP - [facility]  -     Follow Up In [facility]  PCP [facility]; Future  Primary hypertension  -     Follow Up In [facility] PCP - Established  -     Albumin , Urine Random; Future  -     CBC and Auto Differential; Future  -     Comprehensive Metabolic Panel; Future  -     Lipid Panel; Future  -     Magnesium; Future  -     TSH with reflex to Free T4 if abnormal; Future  -     Follow Up In [facility] - PCP - Established; Future  Mixed hyperlipidemia  -     Follow Up In [facility] PCP - [facility]  -     CBC and Auto Differential; Future  -     Comprehensive Metabolic Panel;  Future  -     Lipid Panel; Future  -     Follow Up In [facility] - PCP - Established; Future  Encounter for immunization  -     respiratory syncytial virus, RSV, vaccine, adjuvanted, age 60y+ (Arexvy) 120 mcg/0.5 mL suspension for reconstitution; Inject 0.5 mL into the muscle 1 time for 1 dose.  Special screening examination for neoplasm of prostate  -     Prostate Specific Antigen, Screen; Future  -     Follow Up In [facility] - PCP - Established; Future    2. Patient Counseling:  --Nutrition: Stressed importance of moderation in sodium/caffeine intake, saturated fat and cholesterol, caloric balance, sufficient intake of fresh fruits, vegetables, fiber, calcium, iron.  --Exercise: Stressed the importance of regular exercise.   --Substance Abuse: Discussed cessation/primary prevention of tobacco, alcohol, or other drug use; driving or other dangerous activities under the influence; availability of treatment for abuse.   --Injury prevention: Discussed safety belts, safety helmets, smoke detector, smoking near bedding or upholstery.   --Dental health: Discussed importance of regular tooth brushing, flossing, and dental visits.  --Immunizations reviewed.  --Discussed benefits of screening colonoscopy.  3. Discussed the patient's BMI with him.  The BMI is in the acceptable range.  4. Follow up 6 mos wlabs

## 2024-06-10 ENCOUNTER — TELEMEDICINE (OUTPATIENT)
Dept: PHARMACY | Facility: HOSPITAL | Age: 64
End: 2024-06-10
Payer: MEDICAID

## 2024-06-10 DIAGNOSIS — E11.42 TYPE 2 DIABETES MELLITUS WITH DIABETIC POLYNEUROPATHY, WITH LONG-TERM CURRENT USE OF INSULIN (MULTI): ICD-10-CM

## 2024-06-10 DIAGNOSIS — Z79.4 TYPE 2 DIABETES MELLITUS WITH DIABETIC POLYNEUROPATHY, WITH LONG-TERM CURRENT USE OF INSULIN (MULTI): ICD-10-CM

## 2024-06-10 NOTE — ASSESSMENT & PLAN NOTE
Patient's goal A1c is < 7%.  Is pt at goal? No, 8.8% on 2  Patient's SMBGs are above goal but improved  Rationale for plan: Will continue current therapy for now.     Medication Changes:  CONTINUE:  Lantus to 16 units at bedtime  Metformin 1G BID    Monitoring and Education:  Diabetes Education  Rule of 15: eating ~15 g of carbs when BG less than 80 (half cup juice, 3-4 glucose tabs).  Recognize symptoms of high and low blood sugar.   Eat a realistic healthy diet consisting of fruits, vegetables, fiber, protein food choices on a regular basis and be aware of portion/serving sizes. Reduce carbohydrate consumption and always consume with protein and fat. Avoid foods high in saturated/trans fat, high salt content, and sweets and beverages with added sugars.  Limit alcohol consumption; alcohol may affect your blood sugar and cause hypoglycemia.   Stay active and incorporate ~30 mins of exercise into your daily routine to manage your weight and increase the body's acceptance of insulin.    PATIENT GOALS   Fasting B - 130 mg/dL 2-HR Postprandial BG:   Less than 180 mg/dL A1c:   Less than 7.0 %

## 2024-06-10 NOTE — PROGRESS NOTES
Patient ID: Gunner Dodge is a 63 y.o. male who presents for Diabetes. Pt is here for follow up appointment.     Referring Provider: Mau Avelar DO   Last visit with PCP: 24  Next visit: 24    Verbal consent to manage patient's drug therapy was obtained from patient. They were informed they may decline to participate or withdraw from participation in pharmacy services at any time. Patient is agreeable to detailed voice messages if unable to be reached.     INTERVAL HISTORY:  Labs repeated - A1c improved  Got social security worked out   States now about 201 pounds (has lost ~100 pounds in one year)    Subjective   Treatment Adherence:   Patient did take medications today.   Number of missed doses in last 7 days: none.  Why? N/A     Preferred pharmacy:  in [place]  Can patient afford prescribed medications: Yes, no issues reported     Current exercise: no formal exercise; very painful neuropathy so hard to walk, stopped driving about a year ago    Current diet: tries to avoid sugary drinks; does have SNAP card from medicare for shopping  Breakfast: bagel with butter with tomato juice or milk (at about 2pm)  Lunch: skips  Dinner: chicken breast, mashed potatoes; typically meat + potatoes   Snacks: sugar free jello, grapes  Drinks: water and coffee (black), occasionally diet pepsi      HPI  DIABETES MELLITUS TYPE 2:  Diagnosed with diabetes:  10-12 years per pt. Known diabetic complications: polyneuropathy  Last optometry exam: early   Most recent visit in Podiatry: none-- patient denies sores or cuts on feet today      Current diabetic medications include:  Metformin 1G BID  Lantus 16 units at bedtime    Home blood glucose records (mg/dL):  FBs-140s    Any episodes of hypoglycemia? No, pt denies .  Did patient treat episode of hypoglycemia appropriately? N/A  Does pt have proteinuria? No If appropriate, is patient on ACEi/ARB? N/A    Secondary Prevention  Statin? Yes - LDL 81 on  "12.4.23   ACE-I/ARB? Yes  Aspirin? Yes    Pertinent PMH Review:  PMH of Pancreatitis: No  PMH of Retinopathy: No  PMH of Urinary Tract Infections: No  PMH of MTC: No  CAD, Afib with pacemaker        Review of Systems    Objective     There were no vitals taken for this visit.     Labs - see media tab for results  No results found for: \"BILITOT\", \"CALCIUM\", \"CO2\", \"CL\", \"CREATININE\", \"GLUCOSE\", \"ALKPHOS\", \"K\", \"PROT\", \"NA\", \"AST\", \"ALT\", \"BUN\", \"ANIONGAP\", \"MG\", \"PHOS\", \"GGT\", \"LDH\", \"ALBUMIN\", \"AMYLASE\", \"LIPASE\", \"GFRF\", \"GFRMALE\"  No results found for: \"TRIG\", \"CHOL\", \"LDLCALC\", \"HDL\"    12.4.2023:  A1c = 11.1%  LDL = 81mg/dL  eGFR = 105    2.28.2024  A1c = 8.8%  Creatinine = 0.67mg/dL  eGFR = 106  LDL = 33    Current Outpatient Medications   Medication Instructions    aspirin 81 mg, oral, Daily    cholecalciferol (VITAMIN D3) 50 mcg, oral, Daily    Lantus Solostar U-100 Insulin 14 Units, subcutaneous, Nightly, Take as directed per insulin instructions.    lisinopril 1.25 mg, oral, Daily    metFORMIN XR (GLUCOPHAGE-XR) 2,000 mg, oral, Daily with breakfast, Do not crush, chew, or split.    mirtazapine (REMERON) 7.5 mg, oral, Nightly    pen needle, diabetic 31 gauge x 5/16\" needle Use to inject 1 times daily as directed.    polyethylene glycol (GLYCOLAX, MIRALAX) 17 g, oral, Daily    rosuvastatin (CRESTOR) 5 mg, oral, Every other day        Assessment/Plan   Problem List Items Addressed This Visit             ICD-10-CM    Type 2 diabetes mellitus with diabetic polyneuropathy, with long-term current use of insulin (Multi) E11.42, Z79.4     Patient's goal A1c is < 7%.  Is pt at goal? No, 8.8% on 2.28.24  Patient's SMBGs are above goal but improved  Rationale for plan: Will continue current therapy for now.     Medication Changes:  CONTINUE:  Lantus to 16 units at bedtime  Metformin 1G BID    Monitoring and Education:  Diabetes Education  Rule of 15: eating ~15 g of carbs when BG less than 80 (half cup juice, 3-4 " glucose tabs).  Recognize symptoms of high and low blood sugar.   Eat a realistic healthy diet consisting of fruits, vegetables, fiber, protein food choices on a regular basis and be aware of portion/serving sizes. Reduce carbohydrate consumption and always consume with protein and fat. Avoid foods high in saturated/trans fat, high salt content, and sweets and beverages with added sugars.  Limit alcohol consumption; alcohol may affect your blood sugar and cause hypoglycemia.   Stay active and incorporate ~30 mins of exercise into your daily routine to manage your weight and increase the body's acceptance of insulin.    PATIENT GOALS   Fasting B - 130 mg/dL 2-HR Postprandial BG:   Less than 180 mg/dL A1c:   Less than 7.0 %             Labs ordered:  none - see media tab for results     Referrals:  none     Follow-up:  at 1400 via telephone with Goldie     Time spent with pt: Total length of time 15 (minutes) of the encounter and more than 50% was spent counseling the patient.    Christina Williamson, PharmD  Clinical Pharmacist  [phone]    Continue all meds under the continuation of care with the referring provider and clinical pharmacy team.    Verbal consent to manage patient's drug therapy was obtained from the patient. They were informed they may decline to participate or withdraw from participation in pharmacy services at any time.

## 2024-09-16 ENCOUNTER — APPOINTMENT (OUTPATIENT)
Dept: PHARMACY | Facility: HOSPITAL | Age: 64
End: 2024-09-16
Payer: MEDICAID

## 2024-09-16 DIAGNOSIS — E11.42 TYPE 2 DIABETES MELLITUS WITH DIABETIC POLYNEUROPATHY, WITH LONG-TERM CURRENT USE OF INSULIN: ICD-10-CM

## 2024-09-16 DIAGNOSIS — Z79.4 TYPE 2 DIABETES MELLITUS WITH DIABETIC POLYNEUROPATHY, WITH LONG-TERM CURRENT USE OF INSULIN: ICD-10-CM

## 2024-09-16 NOTE — PROGRESS NOTES
Patient ID: Gunner Dodge is a 63 y.o. male who presents for Diabetes.  Pt is here for Follow Up appointment.       PCP: Mau Avelar DO  Last Visit: 6.5.2024  Next visit: 12.11.2024    Verbal consent to manage patient's drug therapy was obtained from patient. They were informed they may decline to participate or withdraw from participation in pharmacy services at any time. Patient is agreeable to detailed voice messages if unable to be reached.       Subjective   Treatment Adherence:   Patient did  take medications today.   Number of missed doses in last 7 days: 0.  Why? N/A     Preferred pharmacy:  in [place]  Can patient afford prescribed medications: Yes, no issues reported     Current exercise: no formal exercise; very painful neuropathy so hard to walk, stopped driving about a year ago   Current diet: frequently misses meals, but normally  Breakfast: bagel with butter with tomato juice or milk (at about 2pm)  Lunch: skips  Dinner: chicken breast, mashed potatoes; typically meat + potatoes   Snacks: sugar free jello, grapes  Drinks: water and coffee (black), occasionally diet pepsi    HPI  DIABETES MELLITUS TYPE 2:    Diagnosed with diabetes:  10-12 years. Known diabetic complications: polyneuropathy.  Last optometry exam: early 2020  Most recent visit in Podiatry: none-- patient denies sores or cuts on feet today      Current diabetic medications include:  Lantus 16u subcutaneous at bedtime  Metformin XR 1000mg BID      Patient tests SMBGS daily  Home blood glucose records (mg/dL)  BG readings: 150-160 range AM    Any episodes of hypoglycemia? No, none reported .  Did patient treat episode of hypoglycemia appropriately? N/A  Does pt have proteinuria? No If appropriate, is patient on ACEi/ARB? N/A    Secondary Prevention  Statin? Yes Rosuvastatin 5mg every other day  ACE-I/ARB? Yes Lisinopril 2.5mg take half tablet once daily  Aspirin? Yes    Pertinent PMH Review:  PMH of Pancreatitis: No  PMH of  Retinopathy: No  PMH of Urinary Tract Infections: No  PMH of MTC: No  CAD, Afib with pacemaker       Review of Systems    Objective     BP Readings from Last 4 Encounters:   24 99/67   24 93/65   23 134/85   23 93/68      There were no vitals filed for this visit.     Labs  2024  A1c = 8.8%  Creatinine = 0.67mg/dL  eGFR = 106  LDL = 33    2024  A1c = 7.7%  Creatinine = 0.64mg/dL  eGFR = 108  LDL = 47    Current Outpatient Medications   Medication Instructions    Lantus Solostar U-100 Insulin 14 Units, subcutaneous, Nightly, Take as directed per insulin instructions.    lisinopril 1.25 mg, oral, Daily    metFORMIN XR (GLUCOPHAGE-XR) 2,000 mg, oral, Daily with breakfast, Do not crush, chew, or split.    mirtazapine (REMERON) 7.5 mg, oral, Nightly    polyethylene glycol (GLYCOLAX, MIRALAX) 17 g, oral, Daily    rosuvastatin (CRESTOR) 5 mg, oral, Every other day        DRUG INTERACTIONS:  None at time of review    Assessment/Plan   Problem List Items Addressed This Visit             ICD-10-CM    Type 2 diabetes mellitus with diabetic polyneuropathy, with long-term current use of insulin (Multi) E11.42, Z79.4     Patient's goal A1c is < 7%.  Is pt at goal? No, 7.7% on 24  Patient's SMBGs are above goal but improved  Rationale for plan: Will increase for additional glucose control. Pt not interested in additional agents at this time.    Medication Changes:  INCREASE  Lantus to 18 units at bedtime    CONTINUE:  Metformin 1G BID      PATIENT EDUCATION/GOALS  Average < 150mg/dL  Time in range > 70%  Fasting B - 130 mg/dL  Postprandial BG: less than 180 mg/dL  A1c: less than 7%    Low and High Blood Sugar  Symptoms of low blood sugar include: Fast heartbeat, shaking, sweating, nervousness or anxiety, irritability or confusion, and/or dizziness.  Symptoms of high blood sugar include: Feeling more thirsty than usual, urinating often, losing weight without trying, presence of ketones in  the urine, feeling tired and weak, feeling irritable or having other mood changes, having blurry vision, and/or having slow-healing sores.  If you experience symptoms of low blood sugar (blood sugar less than 70 mg/dL) follow the rule of 15 by eating ~15 g of simple carbohydrates (examples: half cup juice, 3-4 glucose tabs, 1 tablespoon of sugar, honey, or syrup).    Dietary Recommendations  The a lower carbohydrate or Mediterranean diet is often recommended for patients with elevated blood glucose.   Food recommendations:   Focus on whole foods, with as few ingredients as possible.   Focus on lower glycemic foods (GI of 55 or less): this includes most fruits and vegetables, beans, minimally processed grains, dairy, nuts and seeds.  Minimize moderate glycemic index (GI 56 to 69) foods: White and sweet potatoes, corn, white rice, couscous, breakfast cereals such as Cream of Wheat.  Avoid high glycemic index (GI of 70 or higher): White bread, rice cakes, most crackers, bagels, cakes, doughnuts, croissants, most packaged breakfast cereals.  Include 1-2 servings weekly fatty fish that are low in mercury such as salmon, mackerel, anchovies, sardines, and herring. Avoid frying fish. Bake, steam, or poach.   Avoid trans-fats (fried foods, microwave popcorn, margarine, etc.).   Use oils such as coconut oil, olive oil, avocado oil, or ghee. Select oils appropriate for the temperature you are cooking at.   Avoid processed meats (such as deli meat), canned soups, soy sauce, and fried foods these are all high in added sodium.   The recommended sodium intake for most people is around 2,300 mg/day (too little or too much salt can affect blood pressure).   It is ok to add salt to suit your taste to fresh whole foods (such as vegetables) that you are cooking at home.   Include 4-5 servings daily of both fruits and vegetables. Fruits and vegetables are a good source of fiber, potassium, and magnesium which help support healthy blood  pressure.  Focus on eating a variety of colors each day (eating the rainbow- such as red peppers, orange carrots, yellow beans, green lettuce, blue/purple berries, white/brown onions).   Avoid foods with added sugars (goal of <10 grams/serving of added sugar). Limit added sugars to less than 24 (women)-36 (men) grams daily.  Beverage recommendations:    Avoid caffeinated drinks such as coffee, energy drinks, and soda (both regular and diet). Consider sparkling water, water with lemon (or other fruit), or black, oolong, or green tea (prior to noon).   Avoid regular consumption of alcohol. If it is a special occasion the recommended alcohol intake for a male is 2 (men) or 1(women) or less drinks per day.  Alcohol consumption may place people with diabetes at increased risk for delayed hypoglycemia (low blood sugar) especially if taking other medications that may cause hypoglycemia such as insulin.     Lifestyle Recommendations  Avoid tobacco products (including chewing tobacco and vaping).   Continue to integrate regular movement and enjoyable forms of exercise into your weekly routine. The recommended exercise regimen is 150 minutes per week (for example 5 days per week, 30 minutes per day).   Consider walking for 10-15 minutes after each meal in order to help control blood sugar.   Manage/reduce stress  Consider therapy, mindfulness, breathing exercises (4-7-8 breath), meditation, yoga, journaling, addressing/removing stressors, etc.   Sleep  Goal of 7-9 hours of restful sleep nightly.            Relevant Orders    Follow Up In [facility] - Pharmacy          Follow-up: 10.15.2024 @ 1400 with Goldie via telephone     Patient was provided with PharmD phone number and encouraged to reach out with any questions or concerns Prior to next appointment or ask provider for another pharmacy referral.    Time spent with pt: Total length of time 10 (minutes) of the encounter and more than 50% was spent counseling the  patient.    Thank you for allowing to take part in the care of this patient.  Ronald Moreno  Student Pharmacist  PharmD Candidate 2025      Continue all meds under the continuation of care with the referring provider and clinical pharmacy team.    Verbal consent to manage patient's drug therapy was obtained from the patient. They were informed they may decline to participate or withdraw from participation in pharmacy services at any time.

## 2024-09-16 NOTE — ASSESSMENT & PLAN NOTE
Patient's goal A1c is < 7%.  Is pt at goal? No, 7.7% on 5..24  Patient's SMBGs are above goal but improved  Rationale for plan: Will increase for additional glucose control. Pt not interested in additional agents at this time.    Medication Changes:  INCREASE  Lantus to 18 units at bedtime    CONTINUE:  Metformin 1G BID      PATIENT EDUCATION/GOALS  Average < 150mg/dL  Time in range > 70%  Fasting B - 130 mg/dL  Postprandial BG: less than 180 mg/dL  A1c: less than 7%    Low and High Blood Sugar  Symptoms of low blood sugar include: Fast heartbeat, shaking, sweating, nervousness or anxiety, irritability or confusion, and/or dizziness.  Symptoms of high blood sugar include: Feeling more thirsty than usual, urinating often, losing weight without trying, presence of ketones in the urine, feeling tired and weak, feeling irritable or having other mood changes, having blurry vision, and/or having slow-healing sores.  If you experience symptoms of low blood sugar (blood sugar less than 70 mg/dL) follow the rule of 15 by eating ~15 g of simple carbohydrates (examples: half cup juice, 3-4 glucose tabs, 1 tablespoon of sugar, honey, or syrup).    Dietary Recommendations  The a lower carbohydrate or Mediterranean diet is often recommended for patients with elevated blood glucose.   Food recommendations:   Focus on whole foods, with as few ingredients as possible.   Focus on lower glycemic foods (GI of 55 or less): this includes most fruits and vegetables, beans, minimally processed grains, dairy, nuts and seeds.  Minimize moderate glycemic index (GI 56 to 69) foods: White and sweet potatoes, corn, white rice, couscous, breakfast cereals such as Cream of Wheat.  Avoid high glycemic index (GI of 70 or higher): White bread, rice cakes, most crackers, bagels, cakes, doughnuts, croissants, most packaged breakfast cereals.  Include 1-2 servings weekly fatty fish that are low in mercury such as salmon, mackerel, anchovies,  sardines, and herring. Avoid frying fish. Bake, steam, or poach.   Avoid trans-fats (fried foods, microwave popcorn, margarine, etc.).   Use oils such as coconut oil, olive oil, avocado oil, or ghee. Select oils appropriate for the temperature you are cooking at.   Avoid processed meats (such as deli meat), canned soups, soy sauce, and fried foods these are all high in added sodium.   The recommended sodium intake for most people is around 2,300 mg/day (too little or too much salt can affect blood pressure).   It is ok to add salt to suit your taste to fresh whole foods (such as vegetables) that you are cooking at home.   Include 4-5 servings daily of both fruits and vegetables. Fruits and vegetables are a good source of fiber, potassium, and magnesium which help support healthy blood pressure.  Focus on eating a variety of colors each day (eating the rainbow- such as red peppers, orange carrots, yellow beans, green lettuce, blue/purple berries, white/brown onions).   Avoid foods with added sugars (goal of <10 grams/serving of added sugar). Limit added sugars to less than 24 (women)-36 (men) grams daily.  Beverage recommendations:    Avoid caffeinated drinks such as coffee, energy drinks, and soda (both regular and diet). Consider sparkling water, water with lemon (or other fruit), or black, oolong, or green tea (prior to noon).   Avoid regular consumption of alcohol. If it is a special occasion the recommended alcohol intake for a male is 2 (men) or 1(women) or less drinks per day.  Alcohol consumption may place people with diabetes at increased risk for delayed hypoglycemia (low blood sugar) especially if taking other medications that may cause hypoglycemia such as insulin.     Lifestyle Recommendations  Avoid tobacco products (including chewing tobacco and vaping).   Continue to integrate regular movement and enjoyable forms of exercise into your weekly routine. The recommended exercise regimen is 150 minutes per  week (for example 5 days per week, 30 minutes per day).   Consider walking for 10-15 minutes after each meal in order to help control blood sugar.   Manage/reduce stress  Consider therapy, mindfulness, breathing exercises (4-7-8 breath), meditation, yoga, journaling, addressing/removing stressors, etc.   Sleep  Goal of 7-9 hours of restful sleep nightly.

## 2024-10-14 NOTE — PROGRESS NOTES
Patient ID: Gunner Dodge is a 63 y.o. male who presents for Diabetes.  Pt is here for Follow Up appointment.       PCP: Mau Avelar DO  Last Visit: 6.5.2024  Next visit: 12.11.2024    Verbal consent to manage patient's drug therapy was obtained from patient. They were informed they may decline to participate or withdraw from participation in pharmacy services at any time. Patient is agreeable to detailed voice messages if unable to be reached.       Subjective   Treatment Adherence:   Patient did  take medications today.   Number of missed doses in last 7 days: 0.  Why? N/A     Preferred pharmacy:  in [place]  Can patient afford prescribed medications: Yes, no issues reported     Current exercise: no formal exercise; very painful neuropathy so hard to walk, stopped driving about a year ago   Current diet: frequently misses meals, but normally  Breakfast: bagel with butter with tomato juice or milk (at about 2pm)  Lunch: skips  Dinner: chicken breast, mashed potatoes; typically meat + potatoes   Snacks: sugar free jello, grapes  Drinks: water and coffee (black), occasionally diet pepsi    HPI  DIABETES MELLITUS TYPE 2:    Diagnosed with diabetes:  10-12 years. Known diabetic complications: polyneuropathy.  Last optometry exam: early 2020  Most recent visit in Podiatry: none-- patient denies sores or cuts on feet today      Current diabetic medications include:  Lantus 18 units subcutaneous at bedtime  Metformin XR 1000mg BID      Patient tests SMBGS daily  Home blood glucose records (mg/dL)  BG readings: 100-120s     Any episodes of hypoglycemia? None  Did patient treat episode of hypoglycemia appropriately? N/A  Does pt have proteinuria? No If appropriate, is patient on ACEi/ARB? N/A    Secondary Prevention  Statin? Yes Rosuvastatin 5mg every other day  ACE-I/ARB? Yes Lisinopril 2.5mg take half tablet once daily  Aspirin? Yes    Pertinent PMH Review:  PMH of Pancreatitis: No  PMH of Retinopathy: No  PMH  of Urinary Tract Infections: No  PMH of MTC: No  CAD, Afib with pacemaker       Review of Systems    Objective     BP Readings from Last 4 Encounters:   24 99/67   24 93/65   23 134/85   23 93/68      There were no vitals filed for this visit.     Labs  2024  A1c = 8.8%  Creatinine = 0.67mg/dL  eGFR = 106  LDL = 33    2024  A1c = 7.7%  Creatinine = 0.64mg/dL  eGFR = 108  LDL = 47    Current Outpatient Medications   Medication Instructions    Lantus Solostar U-100 Insulin 14 Units, subcutaneous, Nightly, Take as directed per insulin instructions.    lisinopril 1.25 mg, oral, Daily    metFORMIN XR (GLUCOPHAGE-XR) 2,000 mg, oral, Daily with breakfast, Do not crush, chew, or split.    mirtazapine (REMERON) 7.5 mg, oral, Nightly    polyethylene glycol (GLYCOLAX, MIRALAX) 17 g, oral, Daily    rosuvastatin (CRESTOR) 5 mg, oral, Every other day        DRUG INTERACTIONS:  None at time of review    Assessment/Plan   Problem List Items Addressed This Visit             ICD-10-CM    Type 2 diabetes mellitus with diabetic polyneuropathy, with long-term current use of insulin E11.42, Z79.4     Patient's goal A1c is < 7%.  Is pt at goal? No, 7.7% on 24  Patient's SMBGs are at goal.  Rationale for plan: Patient's SMBGs are at goal , no changes neccessary at this time.    Medication Changes:    CONTINUE:  Metformin 1G BID  Lantus 18 units at bedtime      PATIENT EDUCATION/GOALS  Average < 150mg/dL  Time in range > 70%  Fasting B - 130 mg/dL  Postprandial BG: less than 180 mg/dL  A1c: less than 7%    Low and High Blood Sugar  Symptoms of low blood sugar include: Fast heartbeat, shaking, sweating, nervousness or anxiety, irritability or confusion, and/or dizziness.  Symptoms of high blood sugar include: Feeling more thirsty than usual, urinating often, losing weight without trying, presence of ketones in the urine, feeling tired and weak, feeling irritable or having other mood changes, having  blurry vision, and/or having slow-healing sores.  If you experience symptoms of low blood sugar (blood sugar less than 70 mg/dL) follow the rule of 15 by eating ~15 g of simple carbohydrates (examples: half cup juice, 3-4 glucose tabs, 1 tablespoon of sugar, honey, or syrup).    Dietary Recommendations  The a lower carbohydrate or Mediterranean diet is often recommended for patients with elevated blood glucose.   Food recommendations:   Focus on whole foods, with as few ingredients as possible.   Focus on lower glycemic foods (GI of 55 or less): this includes most fruits and vegetables, beans, minimally processed grains, dairy, nuts and seeds.  Minimize moderate glycemic index (GI 56 to 69) foods: White and sweet potatoes, corn, white rice, couscous, breakfast cereals such as Cream of Wheat.  Avoid high glycemic index (GI of 70 or higher): White bread, rice cakes, most crackers, bagels, cakes, doughnuts, croissants, most packaged breakfast cereals.  Include 1-2 servings weekly fatty fish that are low in mercury such as salmon, mackerel, anchovies, sardines, and herring. Avoid frying fish. Bake, steam, or poach.   Avoid trans-fats (fried foods, microwave popcorn, margarine, etc.).   Use oils such as coconut oil, olive oil, avocado oil, or ghee. Select oils appropriate for the temperature you are cooking at.   Avoid processed meats (such as deli meat), canned soups, soy sauce, and fried foods these are all high in added sodium.   The recommended sodium intake for most people is around 2,300 mg/day (too little or too much salt can affect blood pressure).   It is ok to add salt to suit your taste to fresh whole foods (such as vegetables) that you are cooking at home.   Include 4-5 servings daily of both fruits and vegetables. Fruits and vegetables are a good source of fiber, potassium, and magnesium which help support healthy blood pressure.  Focus on eating a variety of colors each day (eating the rainbow- such as red  peppers, orange carrots, yellow beans, green lettuce, blue/purple berries, white/brown onions).   Avoid foods with added sugars (goal of <10 grams/serving of added sugar). Limit added sugars to less than 24 (women)-36 (men) grams daily.  Beverage recommendations:    Avoid caffeinated drinks such as coffee, energy drinks, and soda (both regular and diet). Consider sparkling water, water with lemon (or other fruit), or black, oolong, or green tea (prior to noon).   Avoid regular consumption of alcohol. If it is a special occasion the recommended alcohol intake for a male is 2 (men) or 1(women) or less drinks per day.  Alcohol consumption may place people with diabetes at increased risk for delayed hypoglycemia (low blood sugar) especially if taking other medications that may cause hypoglycemia such as insulin.     Lifestyle Recommendations  Avoid tobacco products (including chewing tobacco and vaping).   Continue to integrate regular movement and enjoyable forms of exercise into your weekly routine. The recommended exercise regimen is 150 minutes per week (for example 5 days per week, 30 minutes per day).   Consider walking for 10-15 minutes after each meal in order to help control blood sugar.   Manage/reduce stress  Consider therapy, mindfulness, breathing exercises (4-7-8 breath), meditation, yoga, journaling, addressing/removing stressors, etc.   Sleep  Goal of 7-9 hours of restful sleep nightly.          Relevant Orders    Referral to Clinical Pharmacy            Follow-up:  December 18 @1330 with Goldie LUNDY via telephone     Patient was provided with PharmD phone number and encouraged to reach out with any questions or concerns Prior to next appointment or ask provider for another pharmacy referral.    Time spent with pt: Total length of time 10 (minutes) of the encounter and more than 50% was spent counseling the patient.    Thank you for allowing to take part in the care of this patient.    Connor PUENTES  Jesu Victor  PGY1 Pharmacy Resident  [phone]  [email or web address]       Continue all meds under the continuation of care with the referring provider and clinical pharmacy team.    Verbal consent to manage patient's drug therapy was obtained from the patient. They were informed they may decline to participate or withdraw from participation in pharmacy services at any time.

## 2024-10-15 ENCOUNTER — APPOINTMENT (OUTPATIENT)
Dept: PHARMACY | Facility: HOSPITAL | Age: 64
End: 2024-10-15
Payer: MEDICARE

## 2024-10-15 DIAGNOSIS — Z79.4 TYPE 2 DIABETES MELLITUS WITH DIABETIC POLYNEUROPATHY, WITH LONG-TERM CURRENT USE OF INSULIN: ICD-10-CM

## 2024-10-15 DIAGNOSIS — E11.42 TYPE 2 DIABETES MELLITUS WITH DIABETIC POLYNEUROPATHY, WITH LONG-TERM CURRENT USE OF INSULIN: ICD-10-CM

## 2024-10-15 NOTE — ASSESSMENT & PLAN NOTE
Patient's goal A1c is < 7%.  Is pt at goal? No, 7.7% on 5  Patient's SMBGs are at goal.  Rationale for plan: Patient's SMBGs are at goal , no changes neccessary at this time.    Medication Changes:    CONTINUE:  Metformin 1G BID  Lantus 18 units at bedtime      PATIENT EDUCATION/GOALS  Average < 150mg/dL  Time in range > 70%  Fasting B - 130 mg/dL  Postprandial BG: less than 180 mg/dL  A1c: less than 7%    Low and High Blood Sugar  Symptoms of low blood sugar include: Fast heartbeat, shaking, sweating, nervousness or anxiety, irritability or confusion, and/or dizziness.  Symptoms of high blood sugar include: Feeling more thirsty than usual, urinating often, losing weight without trying, presence of ketones in the urine, feeling tired and weak, feeling irritable or having other mood changes, having blurry vision, and/or having slow-healing sores.  If you experience symptoms of low blood sugar (blood sugar less than 70 mg/dL) follow the rule of 15 by eating ~15 g of simple carbohydrates (examples: half cup juice, 3-4 glucose tabs, 1 tablespoon of sugar, honey, or syrup).    Dietary Recommendations  The a lower carbohydrate or Mediterranean diet is often recommended for patients with elevated blood glucose.   Food recommendations:   Focus on whole foods, with as few ingredients as possible.   Focus on lower glycemic foods (GI of 55 or less): this includes most fruits and vegetables, beans, minimally processed grains, dairy, nuts and seeds.  Minimize moderate glycemic index (GI 56 to 69) foods: White and sweet potatoes, corn, white rice, couscous, breakfast cereals such as Cream of Wheat.  Avoid high glycemic index (GI of 70 or higher): White bread, rice cakes, most crackers, bagels, cakes, doughnuts, croissants, most packaged breakfast cereals.  Include 1-2 servings weekly fatty fish that are low in mercury such as salmon, mackerel, anchovies, sardines, and herring. Avoid frying fish. Bake, steam, or poach.    Avoid trans-fats (fried foods, microwave popcorn, margarine, etc.).   Use oils such as coconut oil, olive oil, avocado oil, or ghee. Select oils appropriate for the temperature you are cooking at.   Avoid processed meats (such as deli meat), canned soups, soy sauce, and fried foods these are all high in added sodium.   The recommended sodium intake for most people is around 2,300 mg/day (too little or too much salt can affect blood pressure).   It is ok to add salt to suit your taste to fresh whole foods (such as vegetables) that you are cooking at home.   Include 4-5 servings daily of both fruits and vegetables. Fruits and vegetables are a good source of fiber, potassium, and magnesium which help support healthy blood pressure.  Focus on eating a variety of colors each day (eating the rainbow- such as red peppers, orange carrots, yellow beans, green lettuce, blue/purple berries, white/brown onions).   Avoid foods with added sugars (goal of <10 grams/serving of added sugar). Limit added sugars to less than 24 (women)-36 (men) grams daily.  Beverage recommendations:    Avoid caffeinated drinks such as coffee, energy drinks, and soda (both regular and diet). Consider sparkling water, water with lemon (or other fruit), or black, oolong, or green tea (prior to noon).   Avoid regular consumption of alcohol. If it is a special occasion the recommended alcohol intake for a male is 2 (men) or 1(women) or less drinks per day.  Alcohol consumption may place people with diabetes at increased risk for delayed hypoglycemia (low blood sugar) especially if taking other medications that may cause hypoglycemia such as insulin.     Lifestyle Recommendations  Avoid tobacco products (including chewing tobacco and vaping).   Continue to integrate regular movement and enjoyable forms of exercise into your weekly routine. The recommended exercise regimen is 150 minutes per week (for example 5 days per week, 30 minutes per day).    Consider walking for 10-15 minutes after each meal in order to help control blood sugar.   Manage/reduce stress  Consider therapy, mindfulness, breathing exercises (4-7-8 breath), meditation, yoga, journaling, addressing/removing stressors, etc.   Sleep  Goal of 7-9 hours of restful sleep nightly.

## 2024-11-26 DIAGNOSIS — Z79.4 TYPE 2 DIABETES MELLITUS WITH DIABETIC POLYNEUROPATHY, WITH LONG-TERM CURRENT USE OF INSULIN: ICD-10-CM

## 2024-11-26 DIAGNOSIS — E11.42 TYPE 2 DIABETES MELLITUS WITH DIABETIC POLYNEUROPATHY, WITH LONG-TERM CURRENT USE OF INSULIN: ICD-10-CM

## 2024-11-27 RX ORDER — INSULIN GLARGINE 100 [IU]/ML
14 INJECTION, SOLUTION SUBCUTANEOUS NIGHTLY
Qty: 15 ML | Refills: 2 | Status: SHIPPED | OUTPATIENT
Start: 2024-11-27

## 2024-11-27 RX ORDER — PEN NEEDLE, DIABETIC 30 GX3/16"
NEEDLE, DISPOSABLE MISCELLANEOUS
Qty: 50 EACH | Refills: 3 | Status: SHIPPED | OUTPATIENT
Start: 2024-11-27

## 2024-12-11 ENCOUNTER — APPOINTMENT (OUTPATIENT)
Dept: PRIMARY CARE | Facility: CLINIC | Age: 64
End: 2024-12-11
Payer: MEDICAID

## 2024-12-12 DIAGNOSIS — E11.42 TYPE 2 DIABETES MELLITUS WITH DIABETIC POLYNEUROPATHY, WITH LONG-TERM CURRENT USE OF INSULIN: ICD-10-CM

## 2024-12-12 DIAGNOSIS — Z79.4 TYPE 2 DIABETES MELLITUS WITH DIABETIC POLYNEUROPATHY, WITH LONG-TERM CURRENT USE OF INSULIN: ICD-10-CM

## 2024-12-12 RX ORDER — INSULIN DEGLUDEC 100 U/ML
14 INJECTION, SOLUTION SUBCUTANEOUS NIGHTLY
Qty: 6 ML | Refills: 0 | Status: SHIPPED | OUTPATIENT
Start: 2024-12-12 | End: 2025-01-11

## 2024-12-12 RX ORDER — INSULIN DEGLUDEC 100 U/ML
14 INJECTION, SOLUTION SUBCUTANEOUS NIGHTLY
Status: CANCELLED | OUTPATIENT
Start: 2024-12-12

## 2024-12-12 NOTE — TELEPHONE ENCOUNTER
Dr Avelar pt calling in stating his insurance won't cover his Lantus any longer, he is stating they will cover Tresiba or Baslagar. Pt needs new script for one of these sent to  [facility]. Pt can be reached at [phone]

## 2024-12-18 ENCOUNTER — APPOINTMENT (OUTPATIENT)
Dept: PHARMACY | Facility: HOSPITAL | Age: 64
End: 2024-12-18
Payer: MEDICARE

## 2024-12-21 DIAGNOSIS — F41.9 ANXIETY: ICD-10-CM

## 2024-12-21 DIAGNOSIS — F32.1 CURRENT MODERATE EPISODE OF MAJOR DEPRESSIVE DISORDER WITHOUT PRIOR EPISODE (MULTI): ICD-10-CM

## 2024-12-21 DIAGNOSIS — Z79.4 TYPE 2 DIABETES MELLITUS WITH DIABETIC POLYNEUROPATHY, WITH LONG-TERM CURRENT USE OF INSULIN: ICD-10-CM

## 2024-12-21 DIAGNOSIS — E11.42 TYPE 2 DIABETES MELLITUS WITH DIABETIC POLYNEUROPATHY, WITH LONG-TERM CURRENT USE OF INSULIN: ICD-10-CM

## 2024-12-23 RX ORDER — ROSUVASTATIN CALCIUM 5 MG/1
TABLET, COATED ORAL DAILY
Qty: 15 TABLET | Refills: 0 | Status: SHIPPED | OUTPATIENT
Start: 2024-12-23

## 2024-12-23 RX ORDER — LISINOPRIL 2.5 MG/1
TABLET ORAL DAILY
Qty: 15 TABLET | Refills: 0 | Status: SHIPPED | OUTPATIENT
Start: 2024-12-23

## 2024-12-23 RX ORDER — MIRTAZAPINE 7.5 MG/1
7.5 TABLET, FILM COATED ORAL NIGHTLY
Qty: 30 TABLET | Refills: 0 | Status: SHIPPED | OUTPATIENT
Start: 2024-12-23

## 2025-01-31 DIAGNOSIS — E11.42 TYPE 2 DIABETES MELLITUS WITH DIABETIC POLYNEUROPATHY, WITH LONG-TERM CURRENT USE OF INSULIN: ICD-10-CM

## 2025-01-31 DIAGNOSIS — Z79.4 TYPE 2 DIABETES MELLITUS WITH DIABETIC POLYNEUROPATHY, WITH LONG-TERM CURRENT USE OF INSULIN: ICD-10-CM

## 2025-01-31 RX ORDER — INSULIN DEGLUDEC 100 U/ML
INJECTION, SOLUTION SUBCUTANEOUS
Qty: 6 ML | Refills: 0 | Status: SHIPPED | OUTPATIENT
Start: 2025-01-31

## 2025-02-06 ENCOUNTER — APPOINTMENT (OUTPATIENT)
Dept: PRIMARY CARE | Facility: CLINIC | Age: 65
End: 2025-02-06
Payer: MEDICARE

## 2025-03-09 DIAGNOSIS — E11.42 TYPE 2 DIABETES MELLITUS WITH DIABETIC POLYNEUROPATHY, WITH LONG-TERM CURRENT USE OF INSULIN: ICD-10-CM

## 2025-03-09 DIAGNOSIS — Z79.4 TYPE 2 DIABETES MELLITUS WITH DIABETIC POLYNEUROPATHY, WITH LONG-TERM CURRENT USE OF INSULIN: ICD-10-CM

## 2025-03-10 RX ORDER — METFORMIN HYDROCHLORIDE 500 MG/1
1000 TABLET, EXTENDED RELEASE ORAL
Qty: 120 TABLET | Refills: 0 | Status: SHIPPED | OUTPATIENT
Start: 2025-03-10

## 2025-03-24 DIAGNOSIS — Z79.4 TYPE 2 DIABETES MELLITUS WITH DIABETIC POLYNEUROPATHY, WITH LONG-TERM CURRENT USE OF INSULIN: ICD-10-CM

## 2025-03-24 DIAGNOSIS — E11.42 TYPE 2 DIABETES MELLITUS WITH DIABETIC POLYNEUROPATHY, WITH LONG-TERM CURRENT USE OF INSULIN: ICD-10-CM

## 2025-03-24 RX ORDER — INSULIN DEGLUDEC 100 U/ML
INJECTION, SOLUTION SUBCUTANEOUS
Qty: 6 ML | Refills: 0 | Status: SHIPPED | OUTPATIENT
Start: 2025-03-24

## 2025-04-10 DIAGNOSIS — Z79.4 TYPE 2 DIABETES MELLITUS WITH DIABETIC POLYNEUROPATHY, WITH LONG-TERM CURRENT USE OF INSULIN: ICD-10-CM

## 2025-04-10 DIAGNOSIS — E11.42 TYPE 2 DIABETES MELLITUS WITH DIABETIC POLYNEUROPATHY, WITH LONG-TERM CURRENT USE OF INSULIN: ICD-10-CM

## 2025-04-11 RX ORDER — METFORMIN HYDROCHLORIDE 500 MG/1
TABLET, EXTENDED RELEASE ORAL
Qty: 120 TABLET | Refills: 0 | Status: SHIPPED | OUTPATIENT
Start: 2025-04-11

## 2025-05-07 DIAGNOSIS — E11.42 TYPE 2 DIABETES MELLITUS WITH DIABETIC POLYNEUROPATHY, WITH LONG-TERM CURRENT USE OF INSULIN: ICD-10-CM

## 2025-05-07 DIAGNOSIS — Z79.4 TYPE 2 DIABETES MELLITUS WITH DIABETIC POLYNEUROPATHY, WITH LONG-TERM CURRENT USE OF INSULIN: ICD-10-CM

## 2025-05-08 RX ORDER — INSULIN DEGLUDEC 100 U/ML
INJECTION, SOLUTION SUBCUTANEOUS
Qty: 6 ML | Refills: 0 | Status: SHIPPED | OUTPATIENT
Start: 2025-05-08

## 2025-05-08 RX ORDER — METFORMIN HYDROCHLORIDE 500 MG/1
TABLET, EXTENDED RELEASE ORAL
Qty: 120 TABLET | Refills: 0 | Status: SHIPPED | OUTPATIENT
Start: 2025-05-08

## 2025-06-13 DIAGNOSIS — Z79.4 TYPE 2 DIABETES MELLITUS WITH DIABETIC POLYNEUROPATHY, WITH LONG-TERM CURRENT USE OF INSULIN: ICD-10-CM

## 2025-06-13 DIAGNOSIS — E11.42 TYPE 2 DIABETES MELLITUS WITH DIABETIC POLYNEUROPATHY, WITH LONG-TERM CURRENT USE OF INSULIN: ICD-10-CM

## 2025-06-13 RX ORDER — METFORMIN HYDROCHLORIDE 500 MG/1
500 TABLET, EXTENDED RELEASE ORAL 4 TIMES DAILY
Qty: 120 TABLET | Refills: 0 | Status: SHIPPED | OUTPATIENT
Start: 2025-06-13

## 2025-06-13 RX ORDER — INSULIN DEGLUDEC 100 U/ML
14 INJECTION, SOLUTION SUBCUTANEOUS NIGHTLY
Qty: 6 ML | Refills: 0 | Status: SHIPPED | OUTPATIENT
Start: 2025-06-13

## 2025-06-13 NOTE — TELEPHONE ENCOUNTER
Upcoming appointment 07- - [facility] has not been in as a result of insurance issues which have since been resolved.

## 2025-06-27 DIAGNOSIS — Z79.4 TYPE 2 DIABETES MELLITUS WITH DIABETIC POLYNEUROPATHY, WITH LONG-TERM CURRENT USE OF INSULIN: ICD-10-CM

## 2025-06-27 DIAGNOSIS — Z11.4 SCREENING FOR HIV (HUMAN IMMUNODEFICIENCY VIRUS): ICD-10-CM

## 2025-06-27 DIAGNOSIS — E11.42 TYPE 2 DIABETES MELLITUS WITH DIABETIC POLYNEUROPATHY, WITH LONG-TERM CURRENT USE OF INSULIN: ICD-10-CM

## 2025-06-27 DIAGNOSIS — Z11.59 NEED FOR HEPATITIS C SCREENING TEST: ICD-10-CM

## 2025-06-27 DIAGNOSIS — I48.91 ATRIAL FIBRILLATION, UNSPECIFIED TYPE (MULTI): ICD-10-CM

## 2025-06-27 DIAGNOSIS — I10 PRIMARY HYPERTENSION: ICD-10-CM

## 2025-06-27 DIAGNOSIS — Z00.00 ROUTINE GENERAL MEDICAL EXAMINATION AT A HEALTH CARE FACILITY: ICD-10-CM

## 2025-07-07 DIAGNOSIS — Z79.4 TYPE 2 DIABETES MELLITUS WITH DIABETIC POLYNEUROPATHY, WITH LONG-TERM CURRENT USE OF INSULIN: ICD-10-CM

## 2025-07-07 DIAGNOSIS — E11.42 TYPE 2 DIABETES MELLITUS WITH DIABETIC POLYNEUROPATHY, WITH LONG-TERM CURRENT USE OF INSULIN: ICD-10-CM

## 2025-07-07 DIAGNOSIS — Z00.00 ROUTINE GENERAL MEDICAL EXAMINATION AT A HEALTH CARE FACILITY: ICD-10-CM

## 2025-07-07 DIAGNOSIS — I48.91 ATRIAL FIBRILLATION, UNSPECIFIED TYPE (MULTI): ICD-10-CM

## 2025-07-07 DIAGNOSIS — I10 PRIMARY HYPERTENSION: ICD-10-CM

## 2025-07-07 DIAGNOSIS — Z11.59 NEED FOR HEPATITIS C SCREENING TEST: ICD-10-CM

## 2025-07-07 DIAGNOSIS — Z11.4 SCREENING FOR HIV (HUMAN IMMUNODEFICIENCY VIRUS): ICD-10-CM

## 2025-07-14 DIAGNOSIS — Z79.4 TYPE 2 DIABETES MELLITUS WITH DIABETIC POLYNEUROPATHY, WITH LONG-TERM CURRENT USE OF INSULIN: ICD-10-CM

## 2025-07-14 DIAGNOSIS — E11.42 TYPE 2 DIABETES MELLITUS WITH DIABETIC POLYNEUROPATHY, WITH LONG-TERM CURRENT USE OF INSULIN: ICD-10-CM

## 2025-07-14 RX ORDER — METFORMIN HYDROCHLORIDE 500 MG/1
TABLET, EXTENDED RELEASE ORAL
Qty: 120 TABLET | Refills: 0 | Status: SHIPPED | OUTPATIENT
Start: 2025-07-14

## 2025-07-23 ENCOUNTER — APPOINTMENT (OUTPATIENT)
Dept: PRIMARY CARE | Facility: CLINIC | Age: 65
End: 2025-07-23
Payer: COMMERCIAL

## 2025-07-23 VITALS
TEMPERATURE: 97.7 F | HEIGHT: 72 IN | DIASTOLIC BLOOD PRESSURE: 85 MMHG | RESPIRATION RATE: 18 BRPM | WEIGHT: 249 LBS | BODY MASS INDEX: 33.72 KG/M2 | HEART RATE: 100 BPM | OXYGEN SATURATION: 97 % | SYSTOLIC BLOOD PRESSURE: 118 MMHG

## 2025-07-23 DIAGNOSIS — Z23 ENCOUNTER FOR IMMUNIZATION: ICD-10-CM

## 2025-07-23 DIAGNOSIS — E11.42 TYPE 2 DIABETES MELLITUS WITH DIABETIC POLYNEUROPATHY, WITH LONG-TERM CURRENT USE OF INSULIN: ICD-10-CM

## 2025-07-23 DIAGNOSIS — E83.42 HYPOMAGNESEMIA: ICD-10-CM

## 2025-07-23 DIAGNOSIS — I48.91 ATRIAL FIBRILLATION, UNSPECIFIED TYPE (MULTI): ICD-10-CM

## 2025-07-23 DIAGNOSIS — Z71.89 ADVANCED DIRECTIVES, COUNSELING/DISCUSSION: ICD-10-CM

## 2025-07-23 DIAGNOSIS — Z79.4 TYPE 2 DIABETES MELLITUS WITH DIABETIC POLYNEUROPATHY, WITH LONG-TERM CURRENT USE OF INSULIN: ICD-10-CM

## 2025-07-23 DIAGNOSIS — E03.9 ACQUIRED HYPOTHYROIDISM: ICD-10-CM

## 2025-07-23 DIAGNOSIS — Z00.00 ROUTINE GENERAL MEDICAL EXAMINATION AT HEALTH CARE FACILITY: Primary | ICD-10-CM

## 2025-07-23 DIAGNOSIS — Z12.11 ENCOUNTER FOR SCREENING FOR MALIGNANT NEOPLASM OF COLON: ICD-10-CM

## 2025-07-23 PROBLEM — F32.1 MAJOR DEPRESSIVE DISORDER, SINGLE EPISODE, MODERATE (MULTI): Status: RESOLVED | Noted: 2023-08-28 | Resolved: 2025-07-23

## 2025-07-23 PROCEDURE — 4010F ACE/ARB THERAPY RXD/TAKEN: CPT | Performed by: FAMILY MEDICINE

## 2025-07-23 PROCEDURE — 3074F SYST BP LT 130 MM HG: CPT | Performed by: FAMILY MEDICINE

## 2025-07-23 PROCEDURE — G0439 PPPS, SUBSEQ VISIT: HCPCS | Performed by: FAMILY MEDICINE

## 2025-07-23 PROCEDURE — 99214 OFFICE O/P EST MOD 30 MIN: CPT | Performed by: FAMILY MEDICINE

## 2025-07-23 PROCEDURE — 3079F DIAST BP 80-89 MM HG: CPT | Performed by: FAMILY MEDICINE

## 2025-07-23 PROCEDURE — 3008F BODY MASS INDEX DOCD: CPT | Performed by: FAMILY MEDICINE

## 2025-07-23 PROCEDURE — 99497 ADVNCD CARE PLAN 30 MIN: CPT | Performed by: FAMILY MEDICINE

## 2025-07-23 RX ORDER — ROSUVASTATIN CALCIUM 5 MG/1
5 TABLET, COATED ORAL DAILY
Qty: 90 TABLET | Refills: 3 | Status: SHIPPED | OUTPATIENT
Start: 2025-07-23 | End: 2026-07-23

## 2025-07-23 RX ORDER — LISINOPRIL 2.5 MG/1
2.5 TABLET ORAL DAILY
Qty: 90 TABLET | Refills: 3 | Status: SHIPPED | OUTPATIENT
Start: 2025-07-23 | End: 2026-07-23

## 2025-07-23 RX ORDER — METFORMIN HYDROCHLORIDE 500 MG/1
1000 TABLET, EXTENDED RELEASE ORAL
Qty: 360 TABLET | Refills: 3 | Status: SHIPPED | OUTPATIENT
Start: 2025-07-23 | End: 2026-07-23

## 2025-07-23 RX ORDER — LEVOTHYROXINE SODIUM 25 UG/1
25 TABLET ORAL DAILY
Qty: 30 TABLET | Refills: 3 | Status: SHIPPED | OUTPATIENT
Start: 2025-07-23 | End: 2025-11-20

## 2025-07-23 RX ORDER — TIRZEPATIDE 5 MG/.5ML
5 INJECTION, SOLUTION SUBCUTANEOUS WEEKLY
Qty: 2 ML | Refills: 11 | Status: SHIPPED | OUTPATIENT
Start: 2025-07-23

## 2025-07-23 RX ORDER — LANOLIN ALCOHOL/MO/W.PET/CERES
CREAM (GRAM) TOPICAL DAILY
Qty: 90 TABLET | Refills: 3 | Status: SHIPPED | OUTPATIENT
Start: 2025-07-23 | End: 2026-07-23

## 2025-07-23 RX ORDER — INSULIN DEGLUDEC 100 U/ML
14 INJECTION, SOLUTION SUBCUTANEOUS NIGHTLY
Qty: 6 ML | Refills: 0 | Status: SHIPPED | OUTPATIENT
Start: 2025-07-23

## 2025-07-23 ASSESSMENT — ENCOUNTER SYMPTOMS
JOINT SWELLING: 0
EYE PAIN: 0
HYPERTENSION: 1
VOICE CHANGE: 0
SEIZURES: 0
DEPRESSION: 1
LIGHT-HEADEDNESS: 0
DIARRHEA: 0
COUGH: 0
EYE DISCHARGE: 0
FEVER: 0
CHEST TIGHTNESS: 0
SINUS PRESSURE: 0
CONSTIPATION: 1
NECK STIFFNESS: 0
EYE ITCHING: 0
NERVOUS/ANXIOUS: 0
BLOOD IN STOOL: 0
WOUND: 0
ADENOPATHY: 0
HEMATURIA: 0
SLEEP DISTURBANCE: 0
WHEEZING: 0
DYSURIA: 0
ARTHRALGIAS: 0
CHILLS: 0
FACIAL ASYMMETRY: 0
NECK PAIN: 0
DYSPHORIC MOOD: 0
DIZZINESS: 0
FATIGUE: 0
SORE THROAT: 0
AGITATION: 0
ABDOMINAL PAIN: 0
FREQUENCY: 0
MYALGIAS: 0
BACK PAIN: 0
NAUSEA: 0
BRUISES/BLEEDS EASILY: 0
VOMITING: 0
RHINORRHEA: 0
PALPITATIONS: 0
WEAKNESS: 0
FLANK PAIN: 0
DIAPHORESIS: 0
HALLUCINATIONS: 0
PHOTOPHOBIA: 0
ABDOMINAL DISTENTION: 0
NUMBNESS: 0
LOSS OF SENSATION IN FEET: 1
EYE REDNESS: 0
HEADACHES: 0
UNEXPECTED WEIGHT CHANGE: 0
TREMORS: 0
POLYDIPSIA: 0
TROUBLE SWALLOWING: 0
CHOKING: 0
APPETITE CHANGE: 0
SHORTNESS OF BREATH: 0
ACTIVITY CHANGE: 0
SPEECH DIFFICULTY: 0
CONFUSION: 0
OCCASIONAL FEELINGS OF UNSTEADINESS: 1

## 2025-07-23 ASSESSMENT — ACTIVITIES OF DAILY LIVING (ADL)
DRESSING: INDEPENDENT
GROCERY_SHOPPING: INDEPENDENT
MANAGING_FINANCES: INDEPENDENT
TAKING_MEDICATION: INDEPENDENT
BATHING: INDEPENDENT
DOING_HOUSEWORK: INDEPENDENT

## 2025-07-23 ASSESSMENT — PATIENT HEALTH QUESTIONNAIRE - PHQ9
SUM OF ALL RESPONSES TO PHQ9 QUESTIONS 1 AND 2: 0
1. LITTLE INTEREST OR PLEASURE IN DOING THINGS: NOT AT ALL
2. FEELING DOWN, DEPRESSED OR HOPELESS: NOT AT ALL

## 2025-07-23 NOTE — PROGRESS NOTES
Subjective   Patient ID: Gunner Dodge is a 64 y.o. male who presents for Medicare Annual Wellness Visit Subsequent (And review labs ), Sinus Problem (Plugged ears, head congestion x couple weeks ), and Shoulder Pain (Left ).    Subjective  Gunner Dodge is a 63 y.o. male and is here for a comprehensive physical exam. The patient reports see below.    Do you take any herbs or supplements that were not prescribed by a doctor? no  Are you taking calcium supplements? no  Are you taking aspirin daily? yes      History:  Date last PSA: 8/1/2023    Diabetes  He presents for his follow-up diabetic visit. He has type 2 diabetes mellitus. His disease course has been worsening. Pertinent negatives for hypoglycemia include no confusion, dizziness, headaches, nervousness/anxiousness, seizures, speech difficulty or tremors. Pertinent negatives for diabetes include no chest pain, no fatigue, no polydipsia, no polyuria and no weakness. There are no hypoglycemic complications. Symptoms are stable. There are no diabetic complications. Risk factors for coronary artery disease include diabetes mellitus, dyslipidemia, hypertension, male sex and sedentary lifestyle. Current diabetic treatment includes insulin injections and oral agent (monotherapy). He is compliant with treatment all of the time. His weight is stable. He is following a generally healthy diet. When asked about meal planning, he reported none. He has not had a previous visit with a dietitian. He participates in exercise intermittently. His home blood glucose trend is increasing steadily. An ACE inhibitor/angiotensin II receptor blocker is being taken.   Hyperlipidemia  This is a chronic problem. The current episode started more than 1 year ago. The problem is controlled. Recent lipid tests were reviewed and are low. He has no history of chronic renal disease, diabetes, hypothyroidism, liver disease, obesity or nephrotic syndrome. Pertinent negatives include no chest pain,  myalgias or shortness of breath. Current antihyperlipidemic treatment includes statins. The current treatment provides significant improvement of lipids. There are no compliance problems.  Risk factors for coronary artery disease include diabetes mellitus, dyslipidemia, hypertension and male sex.   Hypertension  This is a chronic problem. The current episode started more than 1 year ago. The problem is unchanged. The problem is controlled. Pertinent negatives include no chest pain, headaches, neck pain, palpitations or shortness of breath. There are no associated agents to hypertension. Past treatments include ACE inhibitors. The current treatment provides significant improvement. There are no compliance problems.  There is no history of chronic renal disease, a hypertension causing med or a thyroid problem.        Review of Systems   Constitutional:  Negative for activity change, appetite change, chills, diaphoresis, fatigue, fever and unexpected weight change.   HENT:  Negative for congestion, ear pain, hearing loss, nosebleeds, postnasal drip, rhinorrhea, sinus pressure, sneezing, sore throat, tinnitus, trouble swallowing and voice change.    Eyes:  Negative for photophobia, pain, discharge, redness, itching and visual disturbance.   Respiratory:  Negative for cough, choking, chest tightness, shortness of breath and wheezing.    Cardiovascular:  Negative for chest pain, palpitations and leg swelling.   Gastrointestinal:  Positive for constipation. Negative for abdominal distention, abdominal pain, blood in stool, diarrhea, nausea and vomiting.   Endocrine: Negative for cold intolerance, heat intolerance, polydipsia and polyuria.   Genitourinary:  Negative for dysuria, flank pain, frequency, hematuria and urgency.   Musculoskeletal:  Negative for arthralgias, back pain, joint swelling, myalgias, neck pain and neck stiffness.   Skin:  Negative for rash and wound.   Allergic/Immunologic: Negative for  immunocompromised state.   Neurological:  Negative for dizziness, tremors, seizures, syncope, facial asymmetry, speech difficulty, weakness, light-headedness, numbness and headaches.   Hematological:  Negative for adenopathy. Does not bruise/bleed easily.   Psychiatric/Behavioral:  Negative for agitation, behavioral problems, confusion, dysphoric mood, hallucinations, self-injury, sleep disturbance and suicidal ideas. The patient is not nervous/anxious.        Objective   /85 (BP Location: Left arm, Patient Position: Sitting, BP Cuff Size: Large adult)   Pulse 100   Temp 36.5 °C (97.7 °F) (Temporal)   Resp 18   Ht 1.829 m (6')   Wt 113 kg (249 lb)   SpO2 97%   BMI 33.77 kg/m²     Physical Exam  Constitutional:       General: He is not in acute distress.     Appearance: He is not ill-appearing or diaphoretic.   HENT:      Head: Normocephalic and atraumatic.      Right Ear: External ear normal.      Left Ear: External ear normal.      Nose: Nose normal. No rhinorrhea.     Eyes:      General: Lids are normal. No scleral icterus.        Right eye: No discharge.         Left eye: No discharge.      Conjunctiva/sclera: Conjunctivae normal.       Cardiovascular:      Rate and Rhythm: Normal rate and regular rhythm.      Pulses: Normal pulses.      Heart sounds: Normal heart sounds. No murmur heard.  Pulmonary:      Effort: Pulmonary effort is normal. No respiratory distress.      Breath sounds: Normal breath sounds. No decreased breath sounds, wheezing, rhonchi or rales.   Abdominal:      General: Bowel sounds are normal. There is no distension.      Palpations: Abdomen is soft. There is no mass.      Tenderness: There is no abdominal tenderness. There is no guarding or rebound.     Musculoskeletal:         General: No swelling, tenderness or deformity.      Cervical back: No rigidity or tenderness.      Right lower leg: No edema.      Left lower leg: No edema.   Lymphadenopathy:      Cervical: No cervical  adenopathy.      Upper Body:      Right upper body: No supraclavicular adenopathy.      Left upper body: No supraclavicular adenopathy.     Skin:     General: Skin is warm and dry.      Coloration: Skin is not jaundiced or pale.      Findings: No erythema, lesion or rash.     Neurological:      General: No focal deficit present.      Mental Status: He is alert and oriented to person, place, and time.      Sensory: No sensory deficit.      Motor: No weakness or tremor.      Coordination: Coordination normal.      Gait: Gait abnormal.     Psychiatric:         Mood and Affect: Mood normal. Affect is not inappropriate.         Behavior: Behavior normal.         Assessment/Plan   Diagnoses and all orders for this visit:  Routine general medical examination at health care facility  -     Follow Up In [facility] - PCP - Established; Future  Type 2 diabetes mellitus with diabetic polyneuropathy, with long-term current use of insulin  -     insulin degludec (Tresiba FlexTouch) 100 unit/mL (3 mL) pen; Inject 14 Units under the skin once daily at bedtime. Take as directed per insulin instructions.  -     lisinopril 2.5 mg tablet; Take 1 tablet (2.5 mg) by mouth once daily.  -     metFORMIN  mg 24 hr tablet; Take 2 tablets (1,000 mg) by mouth 2 times daily (morning and late afternoon). Do not crush, chew, or split.  -     rosuvastatin (Crestor) 5 mg tablet; Take 1 tablet (5 mg) by mouth once daily.  -     Referral to Ophthalmology; Future  -     Referral to Clinical Pharmacy; Future  -     tirzepatide (Mounjaro) 5 mg/0.5 mL pen injector; Inject 5 mg under the skin 1 (one) time per week.  -     Albumin-Creatinine Ratio, Urine Random; Future  -     CBC and Auto Differential; Future  -     Comprehensive Metabolic Panel; Future  -     Hemoglobin A1C; Future  -     Lipid Panel; Future  -     Magnesium; Future  -     TSH with reflex to Free T4 if abnormal; Future  -     Follow Up In [facility] - PCP -  Established; Future  Advanced directives, counseling/discussion  -     DNR  -     Follow Up In [facility] - PCP - Established; Future  Encounter for screening for malignant neoplasm of colon  -     Colonoscopy Screening; Average Risk Patient; Future  -     Follow Up In [facility] - PCP - Established; Future  Encounter for immunization  -     zoster vaccine-recombinant adjuvanted (Shingrix) 50 mcg/0.5 mL vaccine; Inject 0.5 mL into the muscle 1 time for 1 dose.  -     respiratory syncytial virus, RSV, vaccine, adjuvanted, age 60y+ (Arexvy) 120 mcg/0.5 mL suspension for reconstitution; Inject 0.5 mL into the muscle 1 time for 1 dose.  -     diphth,pertus,acell,,tetanus (BoostRIX) 2.5-8-5 Lf-mcg-Lf/0.5mL injection; Inject 0.5 mL into the muscle 1 time for 1 dose.  -     Follow Up In [facility] - PCP - Established; Future  Atrial fibrillation, unspecified type (Multi)  -     CBC and Auto Differential; Future  -     Magnesium; Future  -     magnesium oxide (Mag-Ox) 400 mg (241.3 mg elemental) tablet; Take 1 tablet by mouth once daily.  -     Follow Up In [facility] - PCP - Established; Future  Acquired hypothyroidism  -     Comprehensive Metabolic Panel; Future  -     Lipid Panel; Future  -     TSH with reflex to Free T4 if abnormal; Future  -     levothyroxine (Synthroid, Levoxyl) 25 mcg tablet; Take 1 tablet (25 mcg) by mouth early in the morning.  Take on an empty stomach at the same time each day, either 30 to 60 minutes prior to breakfast  -     Follow Up In [facility] - PCP - Established; Future  Hypomagnesemia  -     Comprehensive Metabolic Panel; Future  -     Magnesium; Future  -     magnesium oxide (Mag-Ox) 400 mg (241.3 mg elemental) tablet; Take 1 tablet by mouth once daily.  -     Follow Up In [facility] - PCP - Established; Future    Advance care planning was discussed including living will, power of  for healthcare and living will.   Advance care planning packet will be provided to patient at discharge.  Patient was encouraged to bring in any advanced care planning paperwork to file on the chart at their own convenience.  (16min spent discussing above)

## 2025-08-01 ENCOUNTER — APPOINTMENT (OUTPATIENT)
Dept: PHARMACY | Facility: HOSPITAL | Age: 65
End: 2025-08-01
Payer: COMMERCIAL

## 2025-08-01 DIAGNOSIS — Z79.4 TYPE 2 DIABETES MELLITUS WITH DIABETIC POLYNEUROPATHY, WITH LONG-TERM CURRENT USE OF INSULIN: Primary | ICD-10-CM

## 2025-08-01 DIAGNOSIS — E11.42 TYPE 2 DIABETES MELLITUS WITH DIABETIC POLYNEUROPATHY, WITH LONG-TERM CURRENT USE OF INSULIN: Primary | ICD-10-CM

## 2025-08-04 PROCEDURE — RXMED WILLOW AMBULATORY MEDICATION CHARGE

## 2025-08-05 ENCOUNTER — PHARMACY VISIT (OUTPATIENT)
Dept: PHARMACY | Facility: CLINIC | Age: 65
End: 2025-08-05
Payer: COMMERCIAL

## 2025-08-06 ENCOUNTER — PHARMACY VISIT (OUTPATIENT)
Dept: PHARMACY | Facility: CLINIC | Age: 65
End: 2025-08-06
Payer: MEDICARE

## 2025-08-06 NOTE — PROGRESS NOTES
Clinical Pharmacy Appointment    Patient ID: Gunner Dodge is a 64 y.o. male who presents for Diabetes.    Pt is here for Follow Up appointment.      Referring Provider: Mau Avelar DO (PCP)  Last Visit: 7/23/25  Next visit: 10/27/25    Subjective   Interval History:  Re-established with PCP  Last A1c over a year ago  Started on Mounjaro  Has had 2 doses of Mounjaro 2.5 mg so far  SE: initial nausea which is resolving with Tums  Does not check BG  Meter needs new batteries  Unable to use CGM due to pain in arm, making it difficult to place sensor    DIABETES MELLITUS TYPE 2:    Diagnosed with diabetes:  10-12 years.   Known diabetic complications: polyneuropathy, CAD, Afib w/ pacemaker  Last optometry exam: early 2020  Most recent visit in Podiatry: none-- patient denies sores or cuts on feet today     Lifestyle:  Current diet: not discussed  Current exercise: no formal exercise; very painful neuropathy so hard to walk, stopped driving about 2 years ago    Current diabetic medications include:  Metformin XR 1000 mg BID  Tresiba 14 units at bedtime  Mounjaro 2.5 mg once weekly     Patient is not current testing BG.    Home blood glucose records (mg/dL)  N/A     Any episodes of hypoglycemia? None    Did patient treat episode of hypoglycemia appropriately? N/A  Does pt have proteinuria? No If appropriate, is patient on ACEi/ARB? N/A     Secondary Prevention  Statin? Yes (Rosuvastatin 5 mg)  ACE-I/ARB? Yes (Lisinopril 2.5 mg)  Aspirin? Yes     Pertinent PMH Review:  PMH of Pancreatitis: No  PMH of Retinopathy: No  PMH of Urinary Tract Infections: No  PMH of MTC: No    Drug Interactions  No relevant drug interactions were noted.    Medication System Management  Patient's preferred pharmacy: [facility] for most meds, [facility] for Mounjaro  Adherence/Organization: no issues  Affordability/Accessibility: no concerns with cost at this time    Objective     There were no vitals taken for this visit.     Labs  No  "results found for: \"BILITOT\", \"CALCIUM\", \"CO2\", \"CL\", \"CREATININE\", \"GLUCOSE\", \"ALKPHOS\", \"K\", \"PROT\", \"NA\", \"AST\", \"ALT\", \"BUN\", \"ANIONGAP\", \"MG\", \"PHOS\", \"GGT\", \"LDH\", \"ALBUMIN\", \"AMYLASE\", \"LIPASE\", \"GFRF\", \"GFRMALE\"  No results found for: \"TRIG\", \"CHOL\", \"LDLCALC\", \"HDL\"  Lab Results   Component Value Date    HGBA1C 12.5 (H) 08/03/2020       Medications Ordered Prior to Encounter[1]     Assessment/Plan   Problem List Items Addressed This Visit           ICD-10-CM    Type 2 diabetes mellitus with diabetic polyneuropathy, with long-term current use of insulin - Primary E11.42, Z79.4    Patient's goal A1c is < 7%.  Is pt at goal? No, 7.7% on 5/28/24  Patient's SMBGs are not reported today and unknown if currently controlled as no recent A1c.   Rationale for plan: Patient to continue with Mounjaro 2.5 mg for 2 more weeks and then increase to 5 mg/week dose. Encouraged patient to replace batteries in meter so he can get BG readings. Advised patient to complete labs to get better idea of current control.      Medication Changes:    INCREASE:  Mounajro to 5 mg once weekly in 2 weeks     CONTINUE:  Metformin XR 1000 mg BID  Tresiba 14 units at bedtime    Monitoring & Education:  Mounjaro Education:   Counseled patient on Mounjaro MOA, expectations, side effects, duration of therapy, administration, and monitoring parameters.  Provided detailed dosing and administration counseling to ensure proper technique.   Reviewed Mounjaro titration schedule, starting with 2.5 mg once weekly to a goal of 15 mg once weekly if tolerated  Counseled patient on the benefits of GLP-1ra glycemic control and weight loss  Advised patient that they may experience improved satiety after meals and portion sizes of meals may be reduced as doses of Mounjaro increase.          Virginie Deal, PharmD  Clinical Ambulatory Care Pharmacist  Ph: [phone]    Continue all meds under the continuation of care with the referring provider and clinical " "pharmacy team.    Clinical Pharmacist follow up: 9/5/25 or sooner as needed based on clinical intervention  Type of Encounter:  Telephone    Verbal consent to manage patient's drug therapy was obtained from the patient. They were informed they may decline to participate or withdraw from participation in pharmacy services at any time.       [1]   Current Outpatient Medications on File Prior to Visit   Medication Sig Dispense Refill    insulin degludec (Tresiba FlexTouch) 100 unit/mL (3 mL) pen Inject 14 Units under the skin once daily at bedtime. Take as directed per insulin instructions. 6 mL 0    levothyroxine (Synthroid, Levoxyl) 25 mcg tablet Take 1 tablet (25 mcg) by mouth early in the morning.  Take on an empty stomach at the same time each day, either 30 to 60 minutes prior to breakfast 30 tablet 3    lisinopril 2.5 mg tablet Take 1 tablet (2.5 mg) by mouth once daily. 90 tablet 3    magnesium oxide (Mag-Ox) 400 mg (241.3 mg elemental) tablet Take 1 tablet by mouth once daily. 90 tablet 3    metFORMIN  mg 24 hr tablet Take 2 tablets (1,000 mg) by mouth 2 times daily (morning and late afternoon). Do not crush, chew, or split. 360 tablet 3    pen needle, diabetic (BD Ultra-Fine Short Pen Needle) 31 gauge x 5/16\" needle USE TO INJECT 1 TIME AS DIRECTED 50 each 3    rosuvastatin (Crestor) 5 mg tablet Take 1 tablet (5 mg) by mouth once daily. 90 tablet 3    tirzepatide (Mounjaro) 5 mg/0.5 mL pen injector Inject 5 mg under the skin 1 (one) time per week. 2 mL 11     No current facility-administered medications on file prior to visit.     "

## 2025-08-06 NOTE — ASSESSMENT & PLAN NOTE
Patient's goal A1c is < 7%.  Is pt at goal? No, 7.7% on 5/28/24  Patient's SMBGs are not reported today and unknown if currently controlled as no recent A1c.   Rationale for plan: Patient to continue with Mounjaro 2.5 mg for 2 more weeks and then increase to 5 mg/week dose. Encouraged patient to replace batteries in meter so he can get BG readings. Advised patient to complete labs to get better idea of current control.      Medication Changes:    INCREASE:  Mounajro to 5 mg once weekly in 2 weeks     CONTINUE:  Metformin XR 1000 mg BID  Tresiba 14 units at bedtime    Monitoring & Education:  Mounjaro Education:   Counseled patient on Mounjaro MOA, expectations, side effects, duration of therapy, administration, and monitoring parameters.  Provided detailed dosing and administration counseling to ensure proper technique.   Reviewed Mounjaro titration schedule, starting with 2.5 mg once weekly to a goal of 15 mg once weekly if tolerated  Counseled patient on the benefits of GLP-1ra glycemic control and weight loss  Advised patient that they may experience improved satiety after meals and portion sizes of meals may be reduced as doses of Mounjaro increase.

## 2025-08-22 ENCOUNTER — TELEPHONE (OUTPATIENT)
Dept: PRIMARY CARE | Facility: CLINIC | Age: 65
End: 2025-08-22
Payer: MEDICARE

## 2025-09-05 ENCOUNTER — APPOINTMENT (OUTPATIENT)
Dept: PHARMACY | Facility: HOSPITAL | Age: 65
End: 2025-09-05
Payer: MEDICARE

## 2025-10-27 ENCOUNTER — APPOINTMENT (OUTPATIENT)
Dept: PRIMARY CARE | Facility: CLINIC | Age: 65
End: 2025-10-27
Payer: COMMERCIAL

## 2025-10-31 ENCOUNTER — APPOINTMENT (OUTPATIENT)
Dept: PHARMACY | Facility: HOSPITAL | Age: 65
End: 2025-10-31
Payer: MEDICARE

## (undated) DEVICE — SHEET,DRAPE,53X77,STERILE: Brand: MEDLINE

## (undated) DEVICE — CHLORAPREP 26ML ORANGE

## (undated) DEVICE — COVER FT SWCH W15XL17IN GRY ALL OEC SYS

## (undated) DEVICE — 3M™ STERI-DRAPE™ U-DRAPE 1015: Brand: STERI-DRAPE™

## (undated) DEVICE — TUBING PMP L8FT LNG W/ CONN FOR AR-6400 REDEUCE

## (undated) DEVICE — INTENDED FOR TISSUE SEPARATION, AND OTHER PROCEDURES THAT REQUIRE A SHARP SURGICAL BLADE TO PUNCTURE OR CUT.: Brand: BARD-PARKER ® CARBON RIB-BACK BLADES

## (undated) DEVICE — DRAPE,U/ SHT,SPLIT,PLAS,STERIL: Brand: MEDLINE

## (undated) DEVICE — GOWN,SIRUS,POLYRNF,BRTHSLV,XLN/XL,20/CS: Brand: MEDLINE

## (undated) DEVICE — [AGGRESSIVE 6-FLUTE BARREL BUR, ARTHROSCOPIC SHAVER BLADE,  DO NOT RESTERILIZE,  DO NOT USE IF PACKAGE IS DAMAGED,  KEEP DRY,  KEEP AWAY FROM SUNLIGHT]: Brand: FORMULA

## (undated) DEVICE — SPONGE GZ W4XL4IN COT 12 PLY TYP VII WVN C FLD DSGN

## (undated) DEVICE — UNIVERSAL CANNULA SET WITH FENESTRATIONS 5.5 MM (I.D.) X 70 MM: Brand: CONMED

## (undated) DEVICE — SLEEVE TRAC FOAM COBAN DISP FOR 3 PNT SHLDR DISTR SYS STAR

## (undated) DEVICE — NEEDLE SPNL L3 1/2IN OD18GA DMND PT PLAS GRN HUB SENSTCH

## (undated) DEVICE — SPONGE,LAP,18"X18",DLX,XR,ST,5/PK,40/PK: Brand: MEDLINE

## (undated) DEVICE — PATIENT RETURN ELECTRODE, SINGLE-USE, CONTACT QUALITY MONITORING, ADULT, WITH 9FT CORD, FOR PATIENTS WEIGING OVER 33LBS. (15KG): Brand: MEGADYNE

## (undated) DEVICE — ELECTROSURGICAL PENCIL BUTTON SWITCH E-Z CLEAN COATED BLADE ELECTRODE 10 FT (3 M) CORD HOLSTER: Brand: MEGADYNE

## (undated) DEVICE — MARKER SURG SKIN GENTIAN VLT REG TIP W/ 6IN RUL

## (undated) DEVICE — TUBING CONN SUCTION T SHP N CONDUCTIVE PLAS WHT DISP

## (undated) DEVICE — STRIP,CLOSURE,WOUND,MEDI-STRIP,1/2X4: Brand: MEDLINE

## (undated) DEVICE — SUTURE ETHLN SZ 3-0 L18IN NONABSORBABLE BLK PS-2 L19MM 3/8 1669H

## (undated) DEVICE — SUTURE PROL SZ 0 L30IN NONABSORBABLE BLU L36MM CT-1 1/2 CIR 8424H

## (undated) DEVICE — MAT FLR SURG QUICKWICK 28X54 IN DISP

## (undated) DEVICE — COUNTER NDL 40 COUNT HLD 70 FOAM BLK ADH W/ MAG

## (undated) DEVICE — PAD,ABDOMINAL,8"X10",ST,LF: Brand: MEDLINE

## (undated) DEVICE — 90-S CRUISE, SUCTION PROBE, NON-BENDABLE, MAX CUT LEVEL 1: Brand: SERFAS ENERGY

## (undated) DEVICE — DBD-PACK,SHOULDER I,AURORA: Brand: MEDLINE

## (undated) DEVICE — BLADE SAW RESECTOR CUT 4MM

## (undated) DEVICE — GLOVE ORANGE PI 7 1/2   MSG9075

## (undated) DEVICE — LABEL MED MINI W/ MARKER

## (undated) DEVICE — GLOVE ORANGE PI 8   MSG9080

## (undated) DEVICE — TUBING, SUCTION, 1/4" X 10', STRAIGHT: Brand: MEDLINE